# Patient Record
Sex: FEMALE | Race: WHITE | NOT HISPANIC OR LATINO | Employment: OTHER | URBAN - METROPOLITAN AREA
[De-identification: names, ages, dates, MRNs, and addresses within clinical notes are randomized per-mention and may not be internally consistent; named-entity substitution may affect disease eponyms.]

---

## 2023-07-26 PROBLEM — E78.2 MIXED HYPERLIPIDEMIA: Status: ACTIVE | Noted: 2023-07-26

## 2023-07-26 PROBLEM — H93.13 TINNITUS OF BOTH EARS: Status: ACTIVE | Noted: 2023-07-26

## 2023-07-26 PROBLEM — M54.50 CHRONIC BILATERAL LOW BACK PAIN: Status: ACTIVE | Noted: 2023-07-26

## 2023-07-26 PROBLEM — R73.03 PREDIABETES: Status: ACTIVE | Noted: 2023-07-26

## 2023-07-26 PROBLEM — M50.90 CERVICAL DISC DISEASE: Status: ACTIVE | Noted: 2023-07-26

## 2023-07-26 PROBLEM — M85.89 OSTEOPENIA OF MULTIPLE SITES: Status: ACTIVE | Noted: 2023-07-26

## 2023-07-26 PROBLEM — G89.29 CHRONIC BILATERAL LOW BACK PAIN: Status: ACTIVE | Noted: 2023-07-26

## 2023-07-26 PROBLEM — H90.3 SENSORINEURAL HEARING LOSS, BILATERAL: Status: ACTIVE | Noted: 2023-07-26

## 2023-07-26 PROBLEM — K85.90 ACUTE PANCREATITIS (HHS-HCC): Status: ACTIVE | Noted: 2023-07-26

## 2023-07-26 PROBLEM — H93.8X1 SENSATION OF FULLNESS IN RIGHT EAR: Status: ACTIVE | Noted: 2023-07-26

## 2023-07-26 RX ORDER — MULTIVIT-MIN/IRON FUM/FOLIC AC 7.5 MG-4
1 TABLET ORAL DAILY
COMMUNITY
Start: 2015-11-24

## 2023-07-26 RX ORDER — FLUTICASONE PROPIONATE 50 MCG
1 SPRAY, SUSPENSION (ML) NASAL DAILY
COMMUNITY
Start: 2023-01-31

## 2023-07-26 RX ORDER — ROSUVASTATIN CALCIUM 5 MG/1
1 TABLET, COATED ORAL DAILY
COMMUNITY
Start: 2023-01-31 | End: 2024-02-05 | Stop reason: SDUPTHER

## 2023-07-26 RX ORDER — ALBUTEROL SULFATE 90 UG/1
AEROSOL, METERED RESPIRATORY (INHALATION)
COMMUNITY
Start: 2023-01-31 | End: 2024-02-19 | Stop reason: SDUPTHER

## 2023-07-26 RX ORDER — BUDESONIDE AND FORMOTEROL FUMARATE DIHYDRATE 80; 4.5 UG/1; UG/1
AEROSOL RESPIRATORY (INHALATION)
COMMUNITY
Start: 2023-04-06 | End: 2023-08-01 | Stop reason: SINTOL

## 2023-07-26 RX ORDER — PRAVASTATIN SODIUM 40 MG/1
1 TABLET ORAL DAILY
COMMUNITY
End: 2023-08-01 | Stop reason: SINTOL

## 2023-07-27 ENCOUNTER — APPOINTMENT (OUTPATIENT)
Dept: LAB | Facility: LAB | Age: 68
End: 2023-07-27
Payer: COMMERCIAL

## 2023-07-27 LAB
ALANINE AMINOTRANSFERASE (SGPT) (U/L) IN SER/PLAS: 38 U/L (ref 7–45)
ALBUMIN (G/DL) IN SER/PLAS: 4.4 G/DL (ref 3.4–5)
ALKALINE PHOSPHATASE (U/L) IN SER/PLAS: 65 U/L (ref 33–136)
ANION GAP IN SER/PLAS: 17 MMOL/L (ref 10–20)
ASPARTATE AMINOTRANSFERASE (SGOT) (U/L) IN SER/PLAS: 24 U/L (ref 9–39)
BASOPHILS (10*3/UL) IN BLOOD BY AUTOMATED COUNT: 0.03 X10E9/L (ref 0–0.1)
BASOPHILS/100 LEUKOCYTES IN BLOOD BY AUTOMATED COUNT: 0.4 % (ref 0–2)
BILIRUBIN TOTAL (MG/DL) IN SER/PLAS: 0.4 MG/DL (ref 0–1.2)
CALCIUM (MG/DL) IN SER/PLAS: 9.6 MG/DL (ref 8.6–10.3)
CARBON DIOXIDE, TOTAL (MMOL/L) IN SER/PLAS: 23 MMOL/L (ref 21–32)
CHLORIDE (MMOL/L) IN SER/PLAS: 102 MMOL/L (ref 98–107)
CHOLESTEROL (MG/DL) IN SER/PLAS: 289 MG/DL (ref 0–199)
CHOLESTEROL IN HDL (MG/DL) IN SER/PLAS: 44.9 MG/DL
CHOLESTEROL/HDL RATIO: 6.4
CREATININE (MG/DL) IN SER/PLAS: 0.68 MG/DL (ref 0.5–1.05)
EOSINOPHILS (10*3/UL) IN BLOOD BY AUTOMATED COUNT: 0.11 X10E9/L (ref 0–0.7)
EOSINOPHILS/100 LEUKOCYTES IN BLOOD BY AUTOMATED COUNT: 1.4 % (ref 0–6)
ERYTHROCYTE DISTRIBUTION WIDTH (RATIO) BY AUTOMATED COUNT: 14 % (ref 11.5–14.5)
ERYTHROCYTE MEAN CORPUSCULAR HEMOGLOBIN CONCENTRATION (G/DL) BY AUTOMATED: 32.5 G/DL (ref 32–36)
ERYTHROCYTE MEAN CORPUSCULAR VOLUME (FL) BY AUTOMATED COUNT: 87 FL (ref 80–100)
ERYTHROCYTES (10*6/UL) IN BLOOD BY AUTOMATED COUNT: 4.73 X10E12/L (ref 4–5.2)
GFR FEMALE: >90 ML/MIN/1.73M2
GLUCOSE (MG/DL) IN SER/PLAS: 91 MG/DL (ref 74–99)
HEMATOCRIT (%) IN BLOOD BY AUTOMATED COUNT: 41.2 % (ref 36–46)
HEMOGLOBIN (G/DL) IN BLOOD: 13.4 G/DL (ref 12–16)
IMMATURE GRANULOCYTES/100 LEUKOCYTES IN BLOOD BY AUTOMATED COUNT: 0.3 % (ref 0–0.9)
LDL: 171 MG/DL (ref 0–99)
LEUKOCYTES (10*3/UL) IN BLOOD BY AUTOMATED COUNT: 7.9 X10E9/L (ref 4.4–11.3)
LYMPHOCYTES (10*3/UL) IN BLOOD BY AUTOMATED COUNT: 2.96 X10E9/L (ref 1.2–4.8)
LYMPHOCYTES/100 LEUKOCYTES IN BLOOD BY AUTOMATED COUNT: 37.4 % (ref 13–44)
MONOCYTES (10*3/UL) IN BLOOD BY AUTOMATED COUNT: 0.77 X10E9/L (ref 0.1–1)
MONOCYTES/100 LEUKOCYTES IN BLOOD BY AUTOMATED COUNT: 9.7 % (ref 2–10)
NEUTROPHILS (10*3/UL) IN BLOOD BY AUTOMATED COUNT: 4.03 X10E9/L (ref 1.2–7.7)
NEUTROPHILS/100 LEUKOCYTES IN BLOOD BY AUTOMATED COUNT: 50.8 % (ref 40–80)
NON HDL CHOLESTEROL: 244 MG/DL
PLATELETS (10*3/UL) IN BLOOD AUTOMATED COUNT: 347 X10E9/L (ref 150–450)
POTASSIUM (MMOL/L) IN SER/PLAS: 3.9 MMOL/L (ref 3.5–5.3)
PROTEIN TOTAL: 7.5 G/DL (ref 6.4–8.2)
SODIUM (MMOL/L) IN SER/PLAS: 138 MMOL/L (ref 136–145)
TRIGLYCERIDE (MG/DL) IN SER/PLAS: 364 MG/DL (ref 0–149)
UREA NITROGEN (MG/DL) IN SER/PLAS: 7 MG/DL (ref 6–23)
VLDL: 73 MG/DL (ref 0–40)

## 2023-08-01 ENCOUNTER — OFFICE VISIT (OUTPATIENT)
Dept: PRIMARY CARE | Facility: CLINIC | Age: 68
End: 2023-08-01
Payer: COMMERCIAL

## 2023-08-01 VITALS
WEIGHT: 188 LBS | OXYGEN SATURATION: 95 % | HEART RATE: 60 BPM | HEIGHT: 66 IN | RESPIRATION RATE: 16 BRPM | SYSTOLIC BLOOD PRESSURE: 106 MMHG | DIASTOLIC BLOOD PRESSURE: 62 MMHG | BODY MASS INDEX: 30.22 KG/M2

## 2023-08-01 DIAGNOSIS — Z00.00 ANNUAL PHYSICAL EXAM: Primary | ICD-10-CM

## 2023-08-01 DIAGNOSIS — J43.9 PULMONARY EMPHYSEMA, UNSPECIFIED EMPHYSEMA TYPE (MULTI): ICD-10-CM

## 2023-08-01 DIAGNOSIS — Z12.11 ENCOUNTER FOR SCREENING FOR MALIGNANT NEOPLASM OF COLON: ICD-10-CM

## 2023-08-01 DIAGNOSIS — R73.03 PREDIABETES: ICD-10-CM

## 2023-08-01 DIAGNOSIS — E78.2 MIXED HYPERLIPIDEMIA: ICD-10-CM

## 2023-08-01 DIAGNOSIS — Z12.31 ENCOUNTER FOR SCREENING MAMMOGRAM FOR MALIGNANT NEOPLASM OF BREAST: ICD-10-CM

## 2023-08-01 LAB — POC HEMOGLOBIN A1C: 5.9 % (ref 4.2–6.5)

## 2023-08-01 PROCEDURE — 1159F MED LIST DOCD IN RCRD: CPT | Performed by: FAMILY MEDICINE

## 2023-08-01 PROCEDURE — 83036 HEMOGLOBIN GLYCOSYLATED A1C: CPT | Performed by: FAMILY MEDICINE

## 2023-08-01 PROCEDURE — 1160F RVW MEDS BY RX/DR IN RCRD: CPT | Performed by: FAMILY MEDICINE

## 2023-08-01 PROCEDURE — 99214 OFFICE O/P EST MOD 30 MIN: CPT | Performed by: FAMILY MEDICINE

## 2023-08-01 PROCEDURE — 99397 PER PM REEVAL EST PAT 65+ YR: CPT | Performed by: FAMILY MEDICINE

## 2023-08-01 RX ORDER — MONTELUKAST SODIUM 10 MG/1
10 TABLET ORAL NIGHTLY
Qty: 90 TABLET | Refills: 3 | Status: SHIPPED | OUTPATIENT
Start: 2023-08-01 | End: 2024-03-20 | Stop reason: WASHOUT

## 2023-08-01 RX ORDER — TIOTROPIUM BROMIDE 18 UG/1
1 CAPSULE ORAL; RESPIRATORY (INHALATION)
Qty: 90 CAPSULE | Refills: 3 | Status: SHIPPED | OUTPATIENT
Start: 2023-08-01 | End: 2024-02-01 | Stop reason: WASHOUT

## 2023-08-01 NOTE — PROGRESS NOTES
Subjective   Patient ID: Claudia Coffey is a 68 y.o. female who presents for Follow-up.  HPI  Concerns today:    In general the patient states that her health is: good    Regular dental visits: Regular dental visit and has partials  Vision problems: no chagnes  Hearing loss: none    Diet: Trying Mediterrainian  Exercise:walking and aerobic exercise  Weight concerns: yes    Tobacco use:none  Alcohol use:none  Illicit drug use:none    Breast cancer screening:  Last mammogram: 2022  Regular self breast exams: yes  Any changes in the breast:no    Colon cancer screening:  Last done 2020.  reordered   Cologuard    Reviewed chronic medical conditions  Review of Systems    Objective   Physical Exam  General: Patient is alert and oriented ×3 and appears in no acute distress. No respiratory distress.    Head: Atraumatic normocephalic.    Eyes: EOMI, PERRLA      Ears: Canals patent without any irritation, tympanic membranes without inflammation, no swelling, normal light reflex.    Nose: Nares patent. Turbinates are not swollen. No discharge.    Mouth: Normal mucosa. Moist. No erythema, exudates, tonsillar enlargement.    Neck: Normal range of motion, no masses.  Thyroid is palpable and normal in size without any nodules. No anterior cervical or posterior cervical adenopathy.    Heart: Regular rate and rhythm, no murmurs clicks or gallops    Lungs: Clear to auscultation bilaterally without any rhonchi rales or wheezing, lung sounds heard throughout all lung fields    Abdomen: Soft, nontender, no rigidity, rebound, guarding or organomegaly. Bowel sounds ×4 quadrants.    Musculoskeletal: Normal range of motion, strength is grossly intact in the proximal distal muscles of the upper and lower extremities bilaterally, deep tendon reflexes +2 out of 4 and symmetric bilaterally at the patella, Achilles, biceps, triceps, sensation intact.    Nerves: Cranial nerves II through XII appear grossly intact and without deficit    Skin:  Intact, dry, no rashes or erythema    Psych: Normal affect.  Assessment/Plan   Problem List Items Addressed This Visit       Mixed hyperlipidemia    Prediabetes    Relevant Orders    POCT glycosylated hemoglobin (Hb A1C) manually resulted (Completed)     Other Visit Diagnoses       Annual physical exam    -  Primary    Pulmonary emphysema, unspecified emphysema type (CMS/HCC)        Encounter for screening for malignant neoplasm of colon        Encounter for screening mammogram for malignant neoplasm of breast               Patient is a 68-year-old female   Anticipatory guidance given  Mammogram done in 2022 and was normal. reordered  Cologuard was negative in December 23, 2020.  Needs reordered  No need to follow-up with gynecologist at this time     Hyperlipidemia-Uncontrolled after patient stopped Crestor.    Patient stopped pravastatin and atorvastatin due to muscle aches and pains. Tolerating rosuvastatin 5 mg daily. Discussed the importance of her being on a statin medication.  Restarting    Continue vitamin D 2000 IUs daily and coenzyme Q10 100 mg daily  Discussed diet and exercise. Patient has started exercising regularly.  Prediabetes-hemoglobin A1c 5.9 in the office 11/15/2022  -Discussed the risks of elevated blood sugars  Stopped metformin  mg 2 tabs daily due to diarrhea and Jardiance due to frequent urination  Discussed diet and exercise  The patient was instructed to pick one thing each month and change that in her diet to more healthy  Instructed to drink orange juice sparingly  - If no improvement over the next 6 months then we will start Trulicity     Hypertriglyceridemia  Patient will work on diet and exercise and taking rosuvastatin and coenzyme Q 10     DDD cervical thoracic anmd lumbar- Disability for this     Shortness of breath on exertion.  Mild COPD   EKG was done in the office and showed normal sinus rate and rhythm as read by myself. Normal axis. No ST elevations or depressions.  Normal T waves. Normal R wave progression.  PFTs done December 2022 showed normal spirometry.  There is a suggestion of small airway disease.  Diffusing capacity is moderately reduced.  Suggestive of pulmonary parenchymal or pulmonary vascular disease.  They suggested doing a methacholine challenge if the patient was having symptoms related to asthma.  Patient has 30-year pack   echo12/22: Left ventricular systolic function is low normal with a 50-55% estimated ejection fraction.  CT chest low dose 12/22-there are no suspicious parenchymal lung nodules.  There was a recommendation for 1 year follow-up.  She did show mild atherosclerotic disease of the thoracic aorta and of the coronary arteries.  Start the Spiriva 1 x day  Start the Sigulair 1 tab daily

## 2023-08-10 ENCOUNTER — TELEPHONE (OUTPATIENT)
Dept: PRIMARY CARE | Facility: CLINIC | Age: 68
End: 2023-08-10
Payer: COMMERCIAL

## 2023-10-11 ENCOUNTER — HOSPITAL ENCOUNTER (OUTPATIENT)
Dept: RADIOLOGY | Facility: HOSPITAL | Age: 68
Discharge: HOME | End: 2023-10-11
Payer: COMMERCIAL

## 2023-10-11 DIAGNOSIS — Z12.31 ENCOUNTER FOR SCREENING MAMMOGRAM FOR MALIGNANT NEOPLASM OF BREAST: ICD-10-CM

## 2023-10-11 PROCEDURE — 77063 BREAST TOMOSYNTHESIS BI: CPT | Mod: BILATERAL PROCEDURE | Performed by: STUDENT IN AN ORGANIZED HEALTH CARE EDUCATION/TRAINING PROGRAM

## 2023-10-11 PROCEDURE — 77067 SCR MAMMO BI INCL CAD: CPT | Mod: BILATERAL PROCEDURE | Performed by: STUDENT IN AN ORGANIZED HEALTH CARE EDUCATION/TRAINING PROGRAM

## 2023-10-11 PROCEDURE — 77063 BREAST TOMOSYNTHESIS BI: CPT

## 2023-10-11 PROCEDURE — 77067 SCR MAMMO BI INCL CAD: CPT | Mod: 50

## 2023-10-12 DIAGNOSIS — R92.8 ABNORMAL MAMMOGRAM OF LEFT BREAST: Primary | ICD-10-CM

## 2023-10-17 ENCOUNTER — ANCILLARY PROCEDURE (OUTPATIENT)
Dept: RADIOLOGY | Facility: HOSPITAL | Age: 68
End: 2023-10-17
Payer: COMMERCIAL

## 2023-10-17 DIAGNOSIS — R92.8 ABNORMAL MAMMOGRAM OF LEFT BREAST: ICD-10-CM

## 2023-10-17 PROCEDURE — 76642 ULTRASOUND BREAST LIMITED: CPT | Mod: LEFT SIDE | Performed by: RADIOLOGY

## 2023-10-17 PROCEDURE — 76982 USE 1ST TARGET LESION: CPT

## 2023-10-17 PROCEDURE — 76642 ULTRASOUND BREAST LIMITED: CPT | Mod: LT

## 2023-12-18 ENCOUNTER — APPOINTMENT (OUTPATIENT)
Dept: PRIMARY CARE | Facility: CLINIC | Age: 68
End: 2023-12-18
Payer: COMMERCIAL

## 2024-01-10 LAB — NONINV COLON CA DNA+OCC BLD SCRN STL QL: NEGATIVE

## 2024-01-11 ENCOUNTER — TELEPHONE (OUTPATIENT)
Dept: PRIMARY CARE | Facility: CLINIC | Age: 69
End: 2024-01-11
Payer: COMMERCIAL

## 2024-01-11 NOTE — TELEPHONE ENCOUNTER
----- Message from Solitario Cole DO sent at 1/10/2024  8:22 PM EST -----  Let the patient know noni the cologuard was neg

## 2024-01-25 ENCOUNTER — LAB (OUTPATIENT)
Dept: LAB | Facility: LAB | Age: 69
End: 2024-01-25
Payer: COMMERCIAL

## 2024-02-01 ENCOUNTER — APPOINTMENT (OUTPATIENT)
Dept: PRIMARY CARE | Facility: CLINIC | Age: 69
End: 2024-02-01
Payer: COMMERCIAL

## 2024-02-01 ENCOUNTER — OFFICE VISIT (OUTPATIENT)
Dept: PRIMARY CARE | Facility: CLINIC | Age: 69
End: 2024-02-01
Payer: COMMERCIAL

## 2024-02-01 VITALS
TEMPERATURE: 97.2 F | BODY MASS INDEX: 31.66 KG/M2 | DIASTOLIC BLOOD PRESSURE: 76 MMHG | HEART RATE: 67 BPM | OXYGEN SATURATION: 98 % | SYSTOLIC BLOOD PRESSURE: 128 MMHG | WEIGHT: 197 LBS | HEIGHT: 66 IN

## 2024-02-01 DIAGNOSIS — R25.2 LEG CRAMPS: ICD-10-CM

## 2024-02-01 DIAGNOSIS — M50.90 CERVICAL DISC DISEASE: ICD-10-CM

## 2024-02-01 DIAGNOSIS — J43.9 PULMONARY EMPHYSEMA, UNSPECIFIED EMPHYSEMA TYPE (MULTI): ICD-10-CM

## 2024-02-01 DIAGNOSIS — R73.03 PREDIABETES: ICD-10-CM

## 2024-02-01 DIAGNOSIS — I73.9 CLAUDICATION OF CALF MUSCLES (CMS-HCC): ICD-10-CM

## 2024-02-01 DIAGNOSIS — E78.2 MIXED HYPERLIPIDEMIA: ICD-10-CM

## 2024-02-01 DIAGNOSIS — Z00.00 MEDICARE ANNUAL WELLNESS VISIT, INITIAL: Primary | ICD-10-CM

## 2024-02-01 DIAGNOSIS — M85.89 OSTEOPENIA OF MULTIPLE SITES: ICD-10-CM

## 2024-02-01 DIAGNOSIS — Z87.891 HISTORY OF TOBACCO ABUSE: ICD-10-CM

## 2024-02-01 PROCEDURE — G0439 PPPS, SUBSEQ VISIT: HCPCS | Performed by: FAMILY MEDICINE

## 2024-02-01 PROCEDURE — 99214 OFFICE O/P EST MOD 30 MIN: CPT | Performed by: FAMILY MEDICINE

## 2024-02-01 PROCEDURE — 1160F RVW MEDS BY RX/DR IN RCRD: CPT | Performed by: FAMILY MEDICINE

## 2024-02-01 PROCEDURE — 1170F FXNL STATUS ASSESSED: CPT | Performed by: FAMILY MEDICINE

## 2024-02-01 PROCEDURE — 1159F MED LIST DOCD IN RCRD: CPT | Performed by: FAMILY MEDICINE

## 2024-02-01 PROCEDURE — 1036F TOBACCO NON-USER: CPT | Performed by: FAMILY MEDICINE

## 2024-02-01 RX ORDER — BUDESONIDE AND FORMOTEROL FUMARATE DIHYDRATE 160; 4.5 UG/1; UG/1
2 AEROSOL RESPIRATORY (INHALATION)
Qty: 6 G | Refills: 11 | Status: SHIPPED | OUTPATIENT
Start: 2024-02-01 | End: 2024-05-28 | Stop reason: SDUPTHER

## 2024-02-01 RX ORDER — ATORVASTATIN CALCIUM 20 MG/1
TABLET, FILM COATED ORAL
COMMUNITY
Start: 2015-11-24 | End: 2024-02-01 | Stop reason: ALTCHOICE

## 2024-02-01 ASSESSMENT — PATIENT HEALTH QUESTIONNAIRE - PHQ9
SUM OF ALL RESPONSES TO PHQ9 QUESTIONS 1 AND 2: 0
2. FEELING DOWN, DEPRESSED OR HOPELESS: NOT AT ALL
1. LITTLE INTEREST OR PLEASURE IN DOING THINGS: NOT AT ALL

## 2024-02-01 ASSESSMENT — ACTIVITIES OF DAILY LIVING (ADL)
BATHING: INDEPENDENT
GROCERY_SHOPPING: INDEPENDENT
DOING_HOUSEWORK: INDEPENDENT
MANAGING_FINANCES: INDEPENDENT
TAKING_MEDICATION: INDEPENDENT
DRESSING: INDEPENDENT

## 2024-02-01 NOTE — PROGRESS NOTES
Subjective   Patient ID: Claudia Coffey is a 68 y.o. female who presents for Medicare  HPI  Concerns today: Breathing is not doing as well     In general the patient states that her health is: good    Regular dental visits: Regular dental visit and has partials  Vision problems: no chagnes  Hearing loss: none    Diet: Trying Mediterrainian  Exercise:walking and aerobic exercise  Weight concerns: yes    Tobacco use:none  Alcohol use:none  Illicit drug use:none    Breast cancer screening:  Last mammogram: 10/2023.  Repeat 1 year  Regular self breast exams: yes  Any changes in the breast:no    Colon cancer screening:  Cologuard neg 1/3/24    Reviewed chronic medical conditions  Review of Systems    Objective   Physical Exam  General: Patient is alert and oriented ×3 and appears in no acute distress. No respiratory distress.    Head: Atraumatic normocephalic.    Eyes: EOMI, PERRLA      Ears: Canals patent without any irritation, tympanic membranes without inflammation, no swelling, normal light reflex.    Nose: Nares patent. Turbinates are not swollen. No discharge.    Mouth: Normal mucosa. Moist. No erythema, exudates, tonsillar enlargement.    Neck: Normal range of motion, no masses.  Thyroid is palpable and normal in size without any nodules. No anterior cervical or posterior cervical adenopathy.    Heart: Regular rate and rhythm, no murmurs clicks or gallops    Lungs: Clear to auscultation bilaterally without any rhonchi rales or wheezing, lung sounds heard throughout all lung fields    Abdomen: Soft, nontender, no rigidity, rebound, guarding or organomegaly. Bowel sounds ×4 quadrants.    Musculoskeletal: Normal range of motion, strength is grossly intact in the proximal distal muscles of the upper and lower extremities bilaterally, deep tendon reflexes +2 out of 4 and symmetric bilaterally at the patella, Achilles, biceps, triceps, sensation intact.    Nerves: Cranial nerves II through XII appear grossly intact and  without deficit    Skin: Intact, dry, no rashes or erythema    Psych: Normal affect.  Assessment/Plan   Problem List Items Addressed This Visit       Cervical disc disease    Mixed hyperlipidemia    Relevant Orders    Vascular US Ankle Brachial Index (CJ) Without Exercise    Comprehensive Metabolic Panel    CBC and Auto Differential    Lipid Panel    TSH with reflex to Free T4 if abnormal    Vitamin B12    Osteopenia of multiple sites    Relevant Orders    TSH with reflex to Free T4 if abnormal    Prediabetes    Relevant Orders    Comprehensive Metabolic Panel    CBC and Auto Differential    Hemoglobin A1C     Other Visit Diagnoses       Pulmonary emphysema, unspecified emphysema type (CMS/HCC)    -  Primary    Relevant Medications    budesonide-formoteroL (Symbicort) 160-4.5 mcg/actuation inhaler    Leg cramps        Relevant Orders    Vascular US Ankle Brachial Index (CJ) Without Exercise    TSH with reflex to Free T4 if abnormal    History of tobacco abuse        Relevant Orders    Vascular US Ankle Brachial Index (CJ) Without Exercise    Claudication of calf muscles (CMS/HCC)        Relevant Orders    Vascular US Ankle Brachial Index (CJ) Without Exercise        Reviewed in for the patient's past medical history, surgical history, family history, social history  Depression screening was done in the office today using PHQ-9 and anxiety screening was done using NANCY-7.  Memory  was checked using Mini-Mental Status exam today.  Patient scored 30 out of 30  We reviewed the patient's activities of daily living and possible risk for falling.  Patient is stable.  Discussed preventative screenings  Vaccinations were discussed in the office.  We did review the patient's status on influenza vaccination, pneumonia vaccination, tetanus vaccination, and vaccination  Patient was instructed to follow-up with dentist regularly and also with eye doctor regularly  We discussed advanced directives including power of ,  living well and DO NOT RESUSCITATE orders    Patient is a 68-year-old female   Anticipatory guidance given  Mammogram done in October 2023 and fibrocystic breasts.  Repeat in 1 year.  Had US 10/23  Cologuard was negative in December 23, 2020 and 1/3/2024  DEXA 2020- Normal without any osteopenia or osteoporosis  No need to follow-up with gynecologist at this time     Hyperlipidemia-controlled  Patient stopped pravastatin and atorvastatin due to muscle aches and pains. Tolerating rosuvastatin 5 mg daily. Discussed the importance of her being on a statin medication.      Continue vitamin D 2000 IUs daily and coenzyme Q10 100 mg daily  Discussed diet and exercise. Patient has started exercising regularly.  Prediabetes-hemoglobin A1c 5.9 in the office August 2023  -Discussed the risks of elevated blood sugars  Stopped metformin  mg 2 tabs daily due to diarrhea and Jardiance due to frequent urination  Discussed diet and exercise  The patient was instructed to pick one thing each month and change that in her diet to more healthy  Instructed to drink orange juice sparingly  - If no improvement over the next 6 months then we will start Trulicity     Hypertriglyceridemia- stable  Patient will work on diet and exercise and taking rosuvastatin and coenzyme Q 10     DDD cervical thoracic anmd lumbar- Disability for this- stable     Shortness of breath on exertion.  Mild COPD- stable   EKG was done in the office and showed normal sinus rate and rhythm as read by myself. Normal axis. No ST elevations or depressions. Normal T waves. Normal R wave progression.  PFTs done December 2022 showed normal spirometry.  There is a suggestion of small airway disease.  Diffusing capacity is moderately reduced.  Suggestive of pulmonary parenchymal or pulmonary vascular disease.  They suggested doing a methacholine challenge if the patient was having symptoms related to asthma.  Patient has 30-year pack   echo12/22: Left ventricular  systolic function is low normal with a 50-55% estimated ejection fraction.  CT chest low dose 12/22-there are no suspicious parenchymal lung nodules.  There was a recommendation for 1 year follow-up.  She did show mild atherosclerotic disease of the thoracic aorta and of the coronary arteries.  Would suggest starting NAC  Restart the Sigulair 1 tab daily  Start the Symbicort 1 puff 2 x day  Use rescue inhaler if needed    Leg cramps  - Mag Phos 30c or 30X   - Stay hydrated  - CJ ordered  - looking at electrolytes

## 2024-02-05 ENCOUNTER — APPOINTMENT (OUTPATIENT)
Dept: LAB | Facility: LAB | Age: 69
End: 2024-02-05
Payer: COMMERCIAL

## 2024-02-05 ENCOUNTER — HOSPITAL ENCOUNTER (OUTPATIENT)
Dept: VASCULAR MEDICINE | Facility: HOSPITAL | Age: 69
Discharge: HOME | End: 2024-02-05
Payer: COMMERCIAL

## 2024-02-05 DIAGNOSIS — I73.9 CLAUDICATION OF CALF MUSCLES (CMS-HCC): ICD-10-CM

## 2024-02-05 DIAGNOSIS — R25.2 LEG CRAMPS: ICD-10-CM

## 2024-02-05 DIAGNOSIS — E78.2 MIXED HYPERLIPIDEMIA: Primary | ICD-10-CM

## 2024-02-05 DIAGNOSIS — Z87.891 HISTORY OF TOBACCO ABUSE: ICD-10-CM

## 2024-02-05 DIAGNOSIS — E78.2 MIXED HYPERLIPIDEMIA: ICD-10-CM

## 2024-02-05 PROCEDURE — 93922 UPR/L XTREMITY ART 2 LEVELS: CPT | Performed by: INTERNAL MEDICINE

## 2024-02-05 PROCEDURE — 93922 UPR/L XTREMITY ART 2 LEVELS: CPT

## 2024-02-05 RX ORDER — ROSUVASTATIN CALCIUM 5 MG/1
5 TABLET, COATED ORAL DAILY
Qty: 90 TABLET | Refills: 3 | Status: SHIPPED | OUTPATIENT
Start: 2024-02-05 | End: 2024-05-28 | Stop reason: SDUPTHER

## 2024-02-06 ENCOUNTER — TELEPHONE (OUTPATIENT)
Dept: PRIMARY CARE | Facility: CLINIC | Age: 69
End: 2024-02-06
Payer: COMMERCIAL

## 2024-02-06 NOTE — TELEPHONE ENCOUNTER
----- Message from Solitario Cole DO sent at 2/5/2024  5:16 PM EST -----  Please let patient know that her ankle-brachial index was normal

## 2024-02-07 ENCOUNTER — APPOINTMENT (OUTPATIENT)
Dept: CARDIOLOGY | Facility: HOSPITAL | Age: 69
DRG: 872 | End: 2024-02-07
Payer: COMMERCIAL

## 2024-02-07 ENCOUNTER — APPOINTMENT (OUTPATIENT)
Dept: RADIOLOGY | Facility: HOSPITAL | Age: 69
DRG: 872 | End: 2024-02-07
Payer: COMMERCIAL

## 2024-02-07 ENCOUNTER — HOSPITAL ENCOUNTER (INPATIENT)
Facility: HOSPITAL | Age: 69
LOS: 2 days | Discharge: HOME | DRG: 872 | End: 2024-02-11
Attending: STUDENT IN AN ORGANIZED HEALTH CARE EDUCATION/TRAINING PROGRAM | Admitting: INTERNAL MEDICINE
Payer: COMMERCIAL

## 2024-02-07 DIAGNOSIS — R19.7 DIARRHEA, UNSPECIFIED TYPE: ICD-10-CM

## 2024-02-07 DIAGNOSIS — L03.116 CELLULITIS OF LEFT LOWER EXTREMITY: Primary | ICD-10-CM

## 2024-02-07 PROBLEM — L03.90 CELLULITIS: Status: ACTIVE | Noted: 2024-02-07

## 2024-02-07 LAB
ALBUMIN SERPL BCP-MCNC: 4.3 G/DL (ref 3.4–5)
ALP SERPL-CCNC: 67 U/L (ref 33–136)
ALT SERPL W P-5'-P-CCNC: 45 U/L (ref 7–45)
ANION GAP SERPL CALC-SCNC: 15 MMOL/L (ref 10–20)
APTT PPP: 31 SECONDS (ref 27–38)
AST SERPL W P-5'-P-CCNC: 34 U/L (ref 9–39)
BASOPHILS # BLD AUTO: 0.03 X10*3/UL (ref 0–0.1)
BASOPHILS NFR BLD AUTO: 0.2 %
BILIRUB SERPL-MCNC: 0.8 MG/DL (ref 0–1.2)
BUN SERPL-MCNC: 16 MG/DL (ref 6–23)
CALCIUM SERPL-MCNC: 9.3 MG/DL (ref 8.6–10.3)
CHLORIDE SERPL-SCNC: 98 MMOL/L (ref 98–107)
CO2 SERPL-SCNC: 24 MMOL/L (ref 21–32)
CREAT SERPL-MCNC: 0.9 MG/DL (ref 0.5–1.05)
CRP SERPL-MCNC: 26.49 MG/DL
EGFRCR SERPLBLD CKD-EPI 2021: 71 ML/MIN/1.73M*2
EOSINOPHIL # BLD AUTO: 0.05 X10*3/UL (ref 0–0.7)
EOSINOPHIL NFR BLD AUTO: 0.3 %
ERYTHROCYTE [DISTWIDTH] IN BLOOD BY AUTOMATED COUNT: 14.2 % (ref 11.5–14.5)
ERYTHROCYTE [SEDIMENTATION RATE] IN BLOOD BY WESTERGREN METHOD: 36 MM/H (ref 0–30)
FLUAV RNA RESP QL NAA+PROBE: NOT DETECTED
FLUBV RNA RESP QL NAA+PROBE: NOT DETECTED
GLUCOSE SERPL-MCNC: 107 MG/DL (ref 74–99)
HCT VFR BLD AUTO: 42.1 % (ref 36–46)
HGB BLD-MCNC: 14 G/DL (ref 12–16)
IMM GRANULOCYTES # BLD AUTO: 0.07 X10*3/UL (ref 0–0.7)
IMM GRANULOCYTES NFR BLD AUTO: 0.4 % (ref 0–0.9)
INR PPP: 1.4 (ref 0.9–1.1)
LACTATE SERPL-SCNC: 1.6 MMOL/L (ref 0.4–2)
LYMPHOCYTES # BLD AUTO: 1.17 X10*3/UL (ref 1.2–4.8)
LYMPHOCYTES NFR BLD AUTO: 6.3 %
MCH RBC QN AUTO: 28.4 PG (ref 26–34)
MCHC RBC AUTO-ENTMCNC: 33.3 G/DL (ref 32–36)
MCV RBC AUTO: 85 FL (ref 80–100)
MONOCYTES # BLD AUTO: 1.04 X10*3/UL (ref 0.1–1)
MONOCYTES NFR BLD AUTO: 5.6 %
NEUTROPHILS # BLD AUTO: 16.23 X10*3/UL (ref 1.2–7.7)
NEUTROPHILS NFR BLD AUTO: 87.2 %
NRBC BLD-RTO: 0 /100 WBCS (ref 0–0)
PLATELET # BLD AUTO: 283 X10*3/UL (ref 150–450)
POTASSIUM SERPL-SCNC: 3.6 MMOL/L (ref 3.5–5.3)
PROT SERPL-MCNC: 7.9 G/DL (ref 6.4–8.2)
PROTHROMBIN TIME: 15.3 SECONDS (ref 9.8–12.8)
RBC # BLD AUTO: 4.93 X10*6/UL (ref 4–5.2)
SARS-COV-2 RNA RESP QL NAA+PROBE: NOT DETECTED
SODIUM SERPL-SCNC: 133 MMOL/L (ref 136–145)
WBC # BLD AUTO: 18.6 X10*3/UL (ref 4.4–11.3)

## 2024-02-07 PROCEDURE — 99223 1ST HOSP IP/OBS HIGH 75: CPT | Performed by: INTERNAL MEDICINE

## 2024-02-07 PROCEDURE — 2500000004 HC RX 250 GENERAL PHARMACY W/ HCPCS (ALT 636 FOR OP/ED): Performed by: NURSE PRACTITIONER

## 2024-02-07 PROCEDURE — 85025 COMPLETE CBC W/AUTO DIFF WBC: CPT | Performed by: NURSE PRACTITIONER

## 2024-02-07 PROCEDURE — 93005 ELECTROCARDIOGRAM TRACING: CPT

## 2024-02-07 PROCEDURE — 87040 BLOOD CULTURE FOR BACTERIA: CPT | Mod: GEALAB | Performed by: NURSE PRACTITIONER

## 2024-02-07 PROCEDURE — G0378 HOSPITAL OBSERVATION PER HR: HCPCS

## 2024-02-07 PROCEDURE — 96376 TX/PRO/DX INJ SAME DRUG ADON: CPT

## 2024-02-07 PROCEDURE — 71045 X-RAY EXAM CHEST 1 VIEW: CPT | Mod: FOREIGN READ | Performed by: RADIOLOGY

## 2024-02-07 PROCEDURE — 2500000004 HC RX 250 GENERAL PHARMACY W/ HCPCS (ALT 636 FOR OP/ED): Performed by: INTERNAL MEDICINE

## 2024-02-07 PROCEDURE — 96365 THER/PROPH/DIAG IV INF INIT: CPT

## 2024-02-07 PROCEDURE — 80053 COMPREHEN METABOLIC PANEL: CPT | Performed by: NURSE PRACTITIONER

## 2024-02-07 PROCEDURE — 2500000001 HC RX 250 WO HCPCS SELF ADMINISTERED DRUGS (ALT 637 FOR MEDICARE OP): Performed by: NURSE PRACTITIONER

## 2024-02-07 PROCEDURE — 83605 ASSAY OF LACTIC ACID: CPT | Performed by: NURSE PRACTITIONER

## 2024-02-07 PROCEDURE — 87636 SARSCOV2 & INF A&B AMP PRB: CPT | Performed by: NURSE PRACTITIONER

## 2024-02-07 PROCEDURE — 86140 C-REACTIVE PROTEIN: CPT | Performed by: NURSE PRACTITIONER

## 2024-02-07 PROCEDURE — 36415 COLL VENOUS BLD VENIPUNCTURE: CPT | Performed by: NURSE PRACTITIONER

## 2024-02-07 PROCEDURE — 71045 X-RAY EXAM CHEST 1 VIEW: CPT | Mod: FR

## 2024-02-07 PROCEDURE — 73590 X-RAY EXAM OF LOWER LEG: CPT | Mod: LEFT SIDE | Performed by: RADIOLOGY

## 2024-02-07 PROCEDURE — 85610 PROTHROMBIN TIME: CPT | Performed by: NURSE PRACTITIONER

## 2024-02-07 PROCEDURE — 85652 RBC SED RATE AUTOMATED: CPT | Performed by: NURSE PRACTITIONER

## 2024-02-07 PROCEDURE — A4217 STERILE WATER/SALINE, 500 ML: HCPCS | Performed by: NURSE PRACTITIONER

## 2024-02-07 PROCEDURE — 73590 X-RAY EXAM OF LOWER LEG: CPT | Mod: LT,FR

## 2024-02-07 PROCEDURE — 87081 CULTURE SCREEN ONLY: CPT | Mod: GEALAB | Performed by: NURSE PRACTITIONER

## 2024-02-07 PROCEDURE — 99285 EMERGENCY DEPT VISIT HI MDM: CPT | Mod: 25 | Performed by: STUDENT IN AN ORGANIZED HEALTH CARE EDUCATION/TRAINING PROGRAM

## 2024-02-07 RX ORDER — DOCUSATE SODIUM 100 MG/1
100 CAPSULE, LIQUID FILLED ORAL 2 TIMES DAILY
Status: DISCONTINUED | OUTPATIENT
Start: 2024-02-07 | End: 2024-02-07

## 2024-02-07 RX ORDER — ACETAMINOPHEN 325 MG/1
650 TABLET ORAL EVERY 6 HOURS PRN
Status: DISCONTINUED | OUTPATIENT
Start: 2024-02-07 | End: 2024-02-11 | Stop reason: HOSPADM

## 2024-02-07 RX ORDER — CALCIUM CARBONATE 200(500)MG
500 TABLET,CHEWABLE ORAL 4 TIMES DAILY PRN
Status: DISCONTINUED | OUTPATIENT
Start: 2024-02-07 | End: 2024-02-11 | Stop reason: HOSPADM

## 2024-02-07 RX ORDER — ENOXAPARIN SODIUM 100 MG/ML
40 INJECTION SUBCUTANEOUS EVERY 24 HOURS
Status: DISCONTINUED | OUTPATIENT
Start: 2024-02-07 | End: 2024-02-11 | Stop reason: HOSPADM

## 2024-02-07 RX ORDER — ALBUTEROL SULFATE 90 UG/1
2 AEROSOL, METERED RESPIRATORY (INHALATION) EVERY 6 HOURS PRN
Status: DISCONTINUED | OUTPATIENT
Start: 2024-02-07 | End: 2024-02-11 | Stop reason: HOSPADM

## 2024-02-07 RX ORDER — TALC
3 POWDER (GRAM) TOPICAL DAILY
Status: DISCONTINUED | OUTPATIENT
Start: 2024-02-07 | End: 2024-02-11 | Stop reason: HOSPADM

## 2024-02-07 RX ORDER — ALUMINUM HYDROXIDE, MAGNESIUM HYDROXIDE, AND SIMETHICONE 1200; 120; 1200 MG/30ML; MG/30ML; MG/30ML
30 SUSPENSION ORAL EVERY 6 HOURS PRN
Status: DISCONTINUED | OUTPATIENT
Start: 2024-02-07 | End: 2024-02-11 | Stop reason: HOSPADM

## 2024-02-07 RX ORDER — GUAIFENESIN/DEXTROMETHORPHAN 100-10MG/5
5 SYRUP ORAL EVERY 4 HOURS PRN
Status: DISCONTINUED | OUTPATIENT
Start: 2024-02-07 | End: 2024-02-11 | Stop reason: HOSPADM

## 2024-02-07 RX ORDER — ACETAMINOPHEN 325 MG/1
650 TABLET ORAL EVERY 4 HOURS PRN
Status: DISCONTINUED | OUTPATIENT
Start: 2024-02-07 | End: 2024-02-11 | Stop reason: HOSPADM

## 2024-02-07 RX ORDER — ONDANSETRON 4 MG/1
4 TABLET, FILM COATED ORAL EVERY 8 HOURS PRN
Status: DISCONTINUED | OUTPATIENT
Start: 2024-02-07 | End: 2024-02-11 | Stop reason: HOSPADM

## 2024-02-07 RX ORDER — ONDANSETRON HYDROCHLORIDE 2 MG/ML
4 INJECTION, SOLUTION INTRAVENOUS EVERY 8 HOURS PRN
Status: DISCONTINUED | OUTPATIENT
Start: 2024-02-07 | End: 2024-02-11 | Stop reason: HOSPADM

## 2024-02-07 RX ORDER — SODIUM CHLORIDE 9 MG/ML
85 INJECTION, SOLUTION INTRAVENOUS CONTINUOUS
Status: DISCONTINUED | OUTPATIENT
Start: 2024-02-07 | End: 2024-02-08

## 2024-02-07 RX ORDER — MULTIVIT-MIN/IRON FUM/FOLIC AC 7.5 MG-4
1 TABLET ORAL DAILY
Status: DISCONTINUED | OUTPATIENT
Start: 2024-02-08 | End: 2024-02-11 | Stop reason: HOSPADM

## 2024-02-07 RX ORDER — VANCOMYCIN HYDROCHLORIDE 750 MG/150ML
750 INJECTION, SOLUTION INTRAVENOUS EVERY 12 HOURS
Status: DISCONTINUED | OUTPATIENT
Start: 2024-02-08 | End: 2024-02-09

## 2024-02-07 RX ORDER — GUAIFENESIN 600 MG/1
600 TABLET, EXTENDED RELEASE ORAL EVERY 12 HOURS PRN
Status: DISCONTINUED | OUTPATIENT
Start: 2024-02-07 | End: 2024-02-11 | Stop reason: HOSPADM

## 2024-02-07 RX ORDER — MONTELUKAST SODIUM 10 MG/1
10 TABLET ORAL NIGHTLY
Status: DISCONTINUED | OUTPATIENT
Start: 2024-02-07 | End: 2024-02-11 | Stop reason: HOSPADM

## 2024-02-07 RX ORDER — DOCUSATE SODIUM 100 MG/1
100 CAPSULE, LIQUID FILLED ORAL 2 TIMES DAILY
Status: DISCONTINUED | OUTPATIENT
Start: 2024-02-07 | End: 2024-02-10

## 2024-02-07 RX ORDER — ENOXAPARIN SODIUM 100 MG/ML
40 INJECTION SUBCUTANEOUS EVERY 24 HOURS
Status: DISCONTINUED | OUTPATIENT
Start: 2024-02-07 | End: 2024-02-07

## 2024-02-07 RX ORDER — ACETAMINOPHEN 325 MG/1
975 TABLET ORAL ONCE
Status: COMPLETED | OUTPATIENT
Start: 2024-02-07 | End: 2024-02-07

## 2024-02-07 RX ORDER — ROSUVASTATIN CALCIUM 10 MG/1
5 TABLET, COATED ORAL DAILY
Status: DISCONTINUED | OUTPATIENT
Start: 2024-02-08 | End: 2024-02-11 | Stop reason: HOSPADM

## 2024-02-07 RX ORDER — ACETAMINOPHEN 160 MG/5ML
650 SOLUTION ORAL EVERY 4 HOURS PRN
Status: DISCONTINUED | OUTPATIENT
Start: 2024-02-07 | End: 2024-02-11 | Stop reason: HOSPADM

## 2024-02-07 RX ORDER — ACETAMINOPHEN 650 MG/1
650 SUPPOSITORY RECTAL EVERY 4 HOURS PRN
Status: DISCONTINUED | OUTPATIENT
Start: 2024-02-07 | End: 2024-02-11 | Stop reason: HOSPADM

## 2024-02-07 RX ADMIN — ACETAMINOPHEN 975 MG: 325 TABLET ORAL at 20:29

## 2024-02-07 RX ADMIN — PIPERACILLIN SODIUM AND TAZOBACTAM SODIUM 3.38 G: 3; .375 INJECTION, SOLUTION INTRAVENOUS at 18:53

## 2024-02-07 RX ADMIN — SODIUM CHLORIDE 85 ML/HR: 9 INJECTION, SOLUTION INTRAVENOUS at 23:01

## 2024-02-07 RX ADMIN — VANCOMYCIN HYDROCHLORIDE 1500 MG: 10 INJECTION, POWDER, LYOPHILIZED, FOR SOLUTION INTRAVENOUS at 19:12

## 2024-02-07 RX ADMIN — Medication 3 MG: at 23:01

## 2024-02-07 SDOH — SOCIAL STABILITY: SOCIAL INSECURITY: DO YOU FEEL UNSAFE GOING BACK TO THE PLACE WHERE YOU ARE LIVING?: NO

## 2024-02-07 SDOH — SOCIAL STABILITY: SOCIAL INSECURITY: ARE YOU OR HAVE YOU BEEN THREATENED OR ABUSED PHYSICALLY, EMOTIONALLY, OR SEXUALLY BY ANYONE?: NO

## 2024-02-07 SDOH — SOCIAL STABILITY: SOCIAL INSECURITY: ARE THERE ANY APPARENT SIGNS OF INJURIES/BEHAVIORS THAT COULD BE RELATED TO ABUSE/NEGLECT?: NO

## 2024-02-07 SDOH — SOCIAL STABILITY: SOCIAL INSECURITY: ABUSE: ADULT

## 2024-02-07 SDOH — SOCIAL STABILITY: SOCIAL INSECURITY: DO YOU FEEL ANYONE HAS EXPLOITED OR TAKEN ADVANTAGE OF YOU FINANCIALLY OR OF YOUR PERSONAL PROPERTY?: NO

## 2024-02-07 SDOH — SOCIAL STABILITY: SOCIAL INSECURITY: HAS ANYONE EVER THREATENED TO HURT YOUR FAMILY OR YOUR PETS?: NO

## 2024-02-07 SDOH — SOCIAL STABILITY: SOCIAL INSECURITY: WERE YOU ABLE TO COMPLETE ALL THE BEHAVIORAL HEALTH SCREENINGS?: YES

## 2024-02-07 SDOH — SOCIAL STABILITY: SOCIAL INSECURITY: DOES ANYONE TRY TO KEEP YOU FROM HAVING/CONTACTING OTHER FRIENDS OR DOING THINGS OUTSIDE YOUR HOME?: NO

## 2024-02-07 SDOH — SOCIAL STABILITY: SOCIAL INSECURITY: HAVE YOU HAD THOUGHTS OF HARMING ANYONE ELSE?: NO

## 2024-02-07 ASSESSMENT — COGNITIVE AND FUNCTIONAL STATUS - GENERAL
DAILY ACTIVITIY SCORE: 24
MOBILITY SCORE: 24
PATIENT BASELINE BEDBOUND: NO

## 2024-02-07 ASSESSMENT — ACTIVITIES OF DAILY LIVING (ADL)
TOILETING: INDEPENDENT
HEARING - LEFT EAR: DIFFICULTY WITH NOISE
LACK_OF_TRANSPORTATION: NO
FEEDING YOURSELF: INDEPENDENT
BATHING: INDEPENDENT
WALKS IN HOME: INDEPENDENT
HEARING - RIGHT EAR: DIFFICULTY WITH NOISE
ADEQUATE_TO_COMPLETE_ADL: YES
ASSISTIVE_DEVICE: EYEGLASSES;DENTURES UPPER;DENTURES LOWER
DRESSING YOURSELF: INDEPENDENT
JUDGMENT_ADEQUATE_SAFELY_COMPLETE_DAILY_ACTIVITIES: YES
PATIENT'S MEMORY ADEQUATE TO SAFELY COMPLETE DAILY ACTIVITIES?: YES
GROOMING: INDEPENDENT

## 2024-02-07 ASSESSMENT — PAIN SCALES - GENERAL
PAINLEVEL_OUTOF10: 4
PAINLEVEL_OUTOF10: 2

## 2024-02-07 ASSESSMENT — PATIENT HEALTH QUESTIONNAIRE - PHQ9
SUM OF ALL RESPONSES TO PHQ9 QUESTIONS 1 & 2: 0
2. FEELING DOWN, DEPRESSED OR HOPELESS: NOT AT ALL
1. LITTLE INTEREST OR PLEASURE IN DOING THINGS: NOT AT ALL

## 2024-02-07 ASSESSMENT — LIFESTYLE VARIABLES
EVER FELT BAD OR GUILTY ABOUT YOUR DRINKING: NO
AUDIT-C TOTAL SCORE: 0
AUDIT-C TOTAL SCORE: 0
HOW OFTEN DO YOU HAVE A DRINK CONTAINING ALCOHOL: NEVER
HOW MANY STANDARD DRINKS CONTAINING ALCOHOL DO YOU HAVE ON A TYPICAL DAY: PATIENT DOES NOT DRINK
HOW OFTEN DO YOU HAVE 6 OR MORE DRINKS ON ONE OCCASION: NEVER
EVER HAD A DRINK FIRST THING IN THE MORNING TO STEADY YOUR NERVES TO GET RID OF A HANGOVER: NO
HAVE YOU EVER FELT YOU SHOULD CUT DOWN ON YOUR DRINKING: NO
HAVE PEOPLE ANNOYED YOU BY CRITICIZING YOUR DRINKING: NO
SKIP TO QUESTIONS 9-10: 1

## 2024-02-07 ASSESSMENT — COLUMBIA-SUICIDE SEVERITY RATING SCALE - C-SSRS
1. IN THE PAST MONTH, HAVE YOU WISHED YOU WERE DEAD OR WISHED YOU COULD GO TO SLEEP AND NOT WAKE UP?: NO
6. HAVE YOU EVER DONE ANYTHING, STARTED TO DO ANYTHING, OR PREPARED TO DO ANYTHING TO END YOUR LIFE?: NO
2. HAVE YOU ACTUALLY HAD ANY THOUGHTS OF KILLING YOURSELF?: NO

## 2024-02-07 ASSESSMENT — PAIN DESCRIPTION - PAIN TYPE: TYPE: ACUTE PAIN

## 2024-02-07 ASSESSMENT — PAIN DESCRIPTION - LOCATION: LOCATION: LEG

## 2024-02-07 ASSESSMENT — PAIN - FUNCTIONAL ASSESSMENT
PAIN_FUNCTIONAL_ASSESSMENT: 0-10
PAIN_FUNCTIONAL_ASSESSMENT: 0-10

## 2024-02-07 ASSESSMENT — PAIN DESCRIPTION - ORIENTATION: ORIENTATION: LEFT

## 2024-02-07 ASSESSMENT — PAIN DESCRIPTION - DESCRIPTORS
DESCRIPTORS: ACHING
DESCRIPTORS: ACHING

## 2024-02-07 NOTE — ED TRIAGE NOTES
Patient c/o left lower extremity redness and swelling for the past few days. Patient is worried she has blood poisoning.

## 2024-02-07 NOTE — ED PROVIDER NOTES
HPI   Chief Complaint   Patient presents with    Leg Swelling       68-year-old female was cleaning out her shed and a tank from the shed punctured her skin of her lateral left tib-fib.  She noticed that her leg became red hot today and erythematous.  Her leg was outlined and it extends all the way from her ankle to the right below her knee and it encircles her entire leg.  She endorses a fever and chills.  She endorses feeling weak.  In triage she was tachycardic with a heart rate of 109 bpm.  She was afebrile with a temp of 36.8.  She is not on any anticoagulant medication.  She has a prior history which includes chronic shoulder pain, muscle skeletal system tissue disease, pancreatitis, and hyperglycemia.  She denies history of diabetes.  She has a history of balance disorder and sciatica.  She quit smoking after 23 years of smoking.      History provided by:  Patient and spouse   used: No                        [place] Coma Scale Score: 15                     Patient History   Past Medical History:   Diagnosis Date    Cervical disc disorder, unspecified, unspecified cervical region 11/15/2022    Cervical disc disease    Pain in right shoulder 07/08/2021    Acute pain of right shoulder    Personal history of other diseases of the digestive system     History of fatty infiltration of liver    Personal history of other diseases of the digestive system     History of acute pancreatitis    Personal history of other diseases of the musculoskeletal system and connective tissue     History of osteopenia    Personal history of other endocrine, nutritional and metabolic disease 12/10/2020    History of hyperglycemia    Personal history of other specified conditions     History of balance disorder    Sciatica, unspecified side     Sciatic pain     Past Surgical History:   Procedure Laterality Date    OTHER SURGICAL HISTORY  07/28/2020    Dilation and curettage    OTHER SURGICAL HISTORY  07/28/2020     Ankle surgery    OTHER SURGICAL HISTORY  2020    Biopsy    OTHER SURGICAL HISTORY  2020    Colonoscopy    OTHER SURGICAL HISTORY  2020    Tubal ligation     No family history on file.  Social History     Tobacco Use    Smoking status: Former     Packs/day: 1.00     Years: 30.00     Additional pack years: 0.00     Total pack years: 30.00     Types: Cigarettes     Start date:      Quit date:      Years since quittin.1    Smokeless tobacco: Never   Substance Use Topics    Alcohol use: Never    Drug use: Never       Physical Exam   ED Triage Vitals [24 1740]   Temperature Heart Rate Respirations BP   36.8 °C (98.2 °F) (!) 109 18 145/89      Pulse Ox Temp Source Heart Rate Source Patient Position   94 % Temporal Monitor --      BP Location FiO2 (%)     -- --       Physical Exam  Constitutional:       Appearance: She is obese.   HENT:      Head: Normocephalic and atraumatic.      Nose: Nose normal.      Mouth/Throat:      Mouth: Mucous membranes are dry.   Eyes:      Extraocular Movements: Extraocular movements intact.      Pupils: Pupils are equal, round, and reactive to light.   Cardiovascular:      Rate and Rhythm: Regular rhythm. Tachycardia present.   Pulmonary:      Effort: Pulmonary effort is normal.      Breath sounds: Normal breath sounds.   Abdominal:      General: Abdomen is flat.      Palpations: Abdomen is soft.   Musculoskeletal:         General: Tenderness present. Normal range of motion.      Cervical back: Normal range of motion.   Skin:     General: Skin is warm.      Comments: Entire left lower extremity between the proximal knee and the ankle is erythematous and circumferential.  Pedal pulses 2/2.  Posterior tibial pulses 2/2.  Cap refills less than 2 seconds.  Skin feels warm to the touch.   Neurological:      Mental Status: She is alert.         ED Course & MDM   Diagnoses as of 24   Cellulitis of left lower extremity       Medical Decision Making  I  "discussed with attending whether we should possibly get an ultrasound at this time it appears to be more cellulitis.  I ordered an x-ray to rule out foreign body or osteomyelitis.  I started patient on vancomycin and Zosyn and ordered blood cultures basic labs and inflammatory markers.  She will most certainly come into the hospital for IV antibiotics.  Patient received her tetanus booster.  She is presently receiving vancomycin and started to develop redness on her face and asked them to slow the vancomycin down with concern for \"red man\" syndrome.  She was denying dyspnea or itching.  She also received her Zosyn.  Her COVID and flu were negative.  Her metabolic panel was normal.  Her inflammatory markers were elevated with a sed rate of 36 and a CRP of 27.  Her lactate was normal.  She had acute leukocytosis with a white count of 18.6 and for the last 2 years her white blood cell count has always been normal.  Her neutrophil count was 16.2.  X-ray of the tib-fib showed no acute osseous abnormality.  There was no radiographic evidence of foreign body.  There was no parosteal new bone or bony lysis to suggest osteomyelitis.  Patient was seen and staffed with attending and admitted to hospital service.    Amount and/or Complexity of Data Reviewed  Labs: ordered.  Radiology: ordered and independent interpretation performed.        Procedure  Procedures     Ethan Mark, APRN-CNP  02/07/24 1959    "

## 2024-02-08 ENCOUNTER — APPOINTMENT (OUTPATIENT)
Dept: RADIOLOGY | Facility: HOSPITAL | Age: 69
DRG: 872 | End: 2024-02-08
Payer: COMMERCIAL

## 2024-02-08 LAB
ANION GAP SERPL CALC-SCNC: 14 MMOL/L (ref 10–20)
BUN SERPL-MCNC: 13 MG/DL (ref 6–23)
CALCIUM SERPL-MCNC: 8.4 MG/DL (ref 8.6–10.3)
CHLORIDE SERPL-SCNC: 99 MMOL/L (ref 98–107)
CO2 SERPL-SCNC: 24 MMOL/L (ref 21–32)
CREAT SERPL-MCNC: 0.8 MG/DL (ref 0.5–1.05)
EGFRCR SERPLBLD CKD-EPI 2021: 81 ML/MIN/1.73M*2
ERYTHROCYTE [DISTWIDTH] IN BLOOD BY AUTOMATED COUNT: 14.3 % (ref 11.5–14.5)
GLUCOSE SERPL-MCNC: 118 MG/DL (ref 74–99)
HCT VFR BLD AUTO: 37.9 % (ref 36–46)
HGB BLD-MCNC: 12.5 G/DL (ref 12–16)
MCH RBC QN AUTO: 28.3 PG (ref 26–34)
MCHC RBC AUTO-ENTMCNC: 33 G/DL (ref 32–36)
MCV RBC AUTO: 86 FL (ref 80–100)
NRBC BLD-RTO: 0 /100 WBCS (ref 0–0)
PLATELET # BLD AUTO: 243 X10*3/UL (ref 150–450)
POTASSIUM SERPL-SCNC: 3.7 MMOL/L (ref 3.5–5.3)
RBC # BLD AUTO: 4.42 X10*6/UL (ref 4–5.2)
SODIUM SERPL-SCNC: 133 MMOL/L (ref 136–145)
WBC # BLD AUTO: 15 X10*3/UL (ref 4.4–11.3)

## 2024-02-08 PROCEDURE — 85027 COMPLETE CBC AUTOMATED: CPT | Performed by: NURSE PRACTITIONER

## 2024-02-08 PROCEDURE — 36415 COLL VENOUS BLD VENIPUNCTURE: CPT | Performed by: INTERNAL MEDICINE

## 2024-02-08 PROCEDURE — 2550000001 HC RX 255 CONTRASTS: Performed by: INTERNAL MEDICINE

## 2024-02-08 PROCEDURE — 80048 BASIC METABOLIC PNL TOTAL CA: CPT | Performed by: INTERNAL MEDICINE

## 2024-02-08 PROCEDURE — 2500000001 HC RX 250 WO HCPCS SELF ADMINISTERED DRUGS (ALT 637 FOR MEDICARE OP): Performed by: NURSE PRACTITIONER

## 2024-02-08 PROCEDURE — 2500000001 HC RX 250 WO HCPCS SELF ADMINISTERED DRUGS (ALT 637 FOR MEDICARE OP): Performed by: INTERNAL MEDICINE

## 2024-02-08 PROCEDURE — 73701 CT LOWER EXTREMITY W/DYE: CPT | Mod: LT

## 2024-02-08 PROCEDURE — 73701 CT LOWER EXTREMITY W/DYE: CPT | Mod: LEFT SIDE | Performed by: STUDENT IN AN ORGANIZED HEALTH CARE EDUCATION/TRAINING PROGRAM

## 2024-02-08 PROCEDURE — 2500000004 HC RX 250 GENERAL PHARMACY W/ HCPCS (ALT 636 FOR OP/ED): Performed by: INTERNAL MEDICINE

## 2024-02-08 PROCEDURE — G0378 HOSPITAL OBSERVATION PER HR: HCPCS

## 2024-02-08 PROCEDURE — 99233 SBSQ HOSP IP/OBS HIGH 50: CPT | Performed by: PHYSICIAN ASSISTANT

## 2024-02-08 RX ORDER — FLUTICASONE FUROATE AND VILANTEROL 200; 25 UG/1; UG/1
1 POWDER RESPIRATORY (INHALATION)
Status: DISCONTINUED | OUTPATIENT
Start: 2024-02-08 | End: 2024-02-11 | Stop reason: HOSPADM

## 2024-02-08 RX ORDER — BUDESONIDE AND FORMOTEROL FUMARATE DIHYDRATE 160; 4.5 UG/1; UG/1
2 AEROSOL RESPIRATORY (INHALATION)
Status: DISCONTINUED | OUTPATIENT
Start: 2024-02-08 | End: 2024-02-08

## 2024-02-08 RX ADMIN — PIPERACILLIN SODIUM AND TAZOBACTAM SODIUM 3.38 G: 3; .375 INJECTION, SOLUTION INTRAVENOUS at 17:30

## 2024-02-08 RX ADMIN — DOCUSATE SODIUM 100 MG: 100 CAPSULE, LIQUID FILLED ORAL at 20:30

## 2024-02-08 RX ADMIN — PIPERACILLIN SODIUM AND TAZOBACTAM SODIUM 3.38 G: 3; .375 INJECTION, SOLUTION INTRAVENOUS at 12:02

## 2024-02-08 RX ADMIN — ACETAMINOPHEN 650 MG: 325 TABLET ORAL at 09:04

## 2024-02-08 RX ADMIN — Medication 3 MG: at 20:30

## 2024-02-08 RX ADMIN — PIPERACILLIN SODIUM AND TAZOBACTAM SODIUM 3.38 G: 3; .375 INJECTION, SOLUTION INTRAVENOUS at 06:02

## 2024-02-08 RX ADMIN — VANCOMYCIN HYDROCHLORIDE 750 MG: 750 INJECTION, SOLUTION INTRAVENOUS at 06:35

## 2024-02-08 RX ADMIN — PIPERACILLIN SODIUM AND TAZOBACTAM SODIUM 3.38 G: 3; .375 INJECTION, SOLUTION INTRAVENOUS at 23:27

## 2024-02-08 RX ADMIN — ROSUVASTATIN CALCIUM 5 MG: 10 TABLET, FILM COATED ORAL at 09:03

## 2024-02-08 RX ADMIN — IOHEXOL 75 ML: 350 INJECTION, SOLUTION INTRAVENOUS at 11:28

## 2024-02-08 RX ADMIN — VANCOMYCIN HYDROCHLORIDE 750 MG: 750 INJECTION, SOLUTION INTRAVENOUS at 19:22

## 2024-02-08 RX ADMIN — PIPERACILLIN SODIUM AND TAZOBACTAM SODIUM 3.38 G: 3; .375 INJECTION, SOLUTION INTRAVENOUS at 01:01

## 2024-02-08 RX ADMIN — ACETAMINOPHEN 650 MG: 325 TABLET ORAL at 20:30

## 2024-02-08 RX ADMIN — Medication 1 TABLET: at 09:04

## 2024-02-08 ASSESSMENT — PAIN SCALES - GENERAL
PAINLEVEL_OUTOF10: 7
PAINLEVEL_OUTOF10: 3
PAINLEVEL_OUTOF10: 1
PAINLEVEL_OUTOF10: 1
PAINLEVEL_OUTOF10: 3

## 2024-02-08 ASSESSMENT — COGNITIVE AND FUNCTIONAL STATUS - GENERAL
DAILY ACTIVITIY SCORE: 24
MOBILITY SCORE: 24

## 2024-02-08 ASSESSMENT — ENCOUNTER SYMPTOMS
ABDOMINAL PAIN: 0
EYES NEGATIVE: 1
CONSTIPATION: 0
PSYCHIATRIC NEGATIVE: 1
NEUROLOGICAL NEGATIVE: 1
ANAL BLEEDING: 0
PALPITATIONS: 0
NAUSEA: 1
ROS SKIN COMMENTS: SEE HPI
ALLERGIC/IMMUNOLOGIC NEGATIVE: 1
HEMATOLOGIC/LYMPHATIC NEGATIVE: 1
ENDOCRINE NEGATIVE: 1
RESPIRATORY NEGATIVE: 1
MYALGIAS: 1
VOMITING: 0
ABDOMINAL DISTENTION: 0

## 2024-02-08 ASSESSMENT — PAIN - FUNCTIONAL ASSESSMENT
PAIN_FUNCTIONAL_ASSESSMENT: 0-10

## 2024-02-08 ASSESSMENT — PAIN DESCRIPTION - DESCRIPTORS
DESCRIPTORS: ACHING
DESCRIPTORS: ACHING

## 2024-02-08 ASSESSMENT — PAIN DESCRIPTION - LOCATION
LOCATION: HEAD
LOCATION: LEG

## 2024-02-08 ASSESSMENT — PAIN DESCRIPTION - ORIENTATION: ORIENTATION: LEFT

## 2024-02-08 ASSESSMENT — ACTIVITIES OF DAILY LIVING (ADL): LACK_OF_TRANSPORTATION: NO

## 2024-02-08 NOTE — PROGRESS NOTES
"Vancomycin Dosing by Pharmacy- INITIAL    Claudia Coffey is a 68 y.o. year old female who Pharmacy has been consulted for vancomycin dosing for cellulitis, skin and soft tissue. Based on the patient's indication and renal status this patient will be dosed based on a goal AUC of 400-600.     Renal function is currently stable.    Visit Vitals  /73 (Patient Position: Lying)   Pulse 98   Temp 36 °C (96.8 °F) (Temporal)   Resp 18        Lab Results   Component Value Date    CREATININE 0.90 02/07/2024    CREATININE 0.68 07/27/2023    CREATININE 0.67 01/23/2023    CREATININE 0.73 12/01/2022    CREATININE 0.72 07/14/2022        Patient weight is No results found for: \"PTWEIGHT\"    No results found for: \"CULTURE\"     No intake/output data recorded.  [unfilled]    No results found for: \"PATIENTTEMP\"       Assessment/Plan     Patient has already been given a loading dose of 1500 mg.  Will initiate vancomycin maintenance,  750 mg every 12 hours.    This dosing regimen is predicted by [employer] to result in the following pharmacokinetic parameters:    Regimen: 750 mg IV every 12 hours.  Start time: 07:12 on 02/08/2024  Exposure target: AUC24 (range)400-600 mg/L.hr   AUC24,ss: 430 mg/L.hr  Probability of AUC24 > 400: 58 %  Ctrough,ss: 14.3 mg/L  Probability of Ctrough,ss > 20: 20 %  Probability of nephrotoxicity (Lodise ALEX 2009): 9 %    Follow-up level will be ordered on 2/9 at 5a unless clinically indicated sooner.  Will continue to monitor renal function daily while on vancomycin and order serum creatinine at least every 48 hours if not already ordered.  Follow for continued vancomycin needs, clinical response, and signs/symptoms of toxicity.       Art Arana, PharmD       "

## 2024-02-08 NOTE — H&P
History Of Present Illness  Claudia Coffey is a 68 y.o. female presenting with left lower leg redness, pain and swelling. She says she was cleaning out her shed 3 days ago when one of the tacks accidentally punctured her left calf. She says she had jeans on that day. The next day, she noticed a quarter-sized area of redness surrounding the puncture wound. The following day, she developed low grade fever, chills, rigors, nausea, HA, malaise and myalgias. This am, she noticed that the redness has spread considerably to involve practically the whole lower leg with pain, swelling and warmth. Her  brought her to the ED tonight. Labs on presentation are notable for a WBC of 18.6 and a CRP of 26.5. She received a dose of vancomycin and zosyn in the ED and was given a tetanus booster.      Past Medical History  Past Medical History:   Diagnosis Date    Cervical disc disorder, unspecified, unspecified cervical region 11/15/2022    Cervical disc disease    Pain in right shoulder 07/08/2021    Acute pain of right shoulder    Personal history of other diseases of the digestive system     History of fatty infiltration of liver    Personal history of other diseases of the digestive system     History of acute pancreatitis    Personal history of other diseases of the musculoskeletal system and connective tissue     History of osteopenia    Personal history of other endocrine, nutritional and metabolic disease 12/10/2020    History of hyperglycemia    Personal history of other specified conditions     History of balance disorder    Sciatica, unspecified side     Sciatic pain       Past Surgical History  Past Surgical History:   Procedure Laterality Date    OTHER SURGICAL HISTORY  07/28/2020    Dilation and curettage    OTHER SURGICAL HISTORY  07/28/2020    Ankle surgery    OTHER SURGICAL HISTORY  07/28/2020    Biopsy    OTHER SURGICAL HISTORY  07/28/2020    Colonoscopy    OTHER SURGICAL HISTORY  07/28/2020    Tubal ligation         Social History  She reports that she quit smoking about 23 years ago. Her smoking use included cigarettes. She started smoking about 53 years ago. She has a 30.00 pack-year smoking history. She has never used smokeless tobacco. She reports that she does not drink alcohol and does not use drugs.    Family History   · FHx: early MI (V17.3) (Z82.49)     · Family history of lung cancer (V16.1) (Z80.1)     · Family history of cerebellar ataxia (V17.2) (Z82.0)     · Family history of cerebellar ataxia (V17.2) (Z82.0)     Allergies  Shellfish containing products    Review of Systems   Constitutional:         See  HPI   HENT: Negative.     Eyes: Negative.    Respiratory: Negative.     Cardiovascular:  Positive for leg swelling. Negative for chest pain and palpitations.   Gastrointestinal:  Positive for nausea. Negative for abdominal distention, abdominal pain, anal bleeding, constipation and vomiting.   Endocrine: Negative.    Genitourinary: Negative.    Musculoskeletal:  Positive for myalgias.   Skin:         See HPI   Allergic/Immunologic: Negative.    Neurological: Negative.    Hematological: Negative.    Psychiatric/Behavioral: Negative.          Physical Exam  Constitutional:       General: She is not in acute distress.     Appearance: She is obese. She is not ill-appearing, toxic-appearing or diaphoretic.   HENT:      Head: Normocephalic and atraumatic.      Nose: Nose normal.      Mouth/Throat:      Mouth: Mucous membranes are dry.      Pharynx: Oropharynx is clear. No oropharyngeal exudate or posterior oropharyngeal erythema.   Eyes:      General: No scleral icterus.        Right eye: No discharge.         Left eye: No discharge.      Conjunctiva/sclera: Conjunctivae normal.   Cardiovascular:      Rate and Rhythm: Normal rate and regular rhythm.      Heart sounds: Normal heart sounds. No murmur heard.  Pulmonary:      Breath sounds: Normal breath sounds. No wheezing, rhonchi or rales.   Abdominal:       "General: There is no distension.      Palpations: Abdomen is soft. There is no mass.      Tenderness: There is no abdominal tenderness. There is no right CVA tenderness or left CVA tenderness.   Musculoskeletal:         General: Swelling and tenderness present.      Cervical back: Neck supple.      Right lower leg: No edema.      Left lower leg: Edema present.   Lymphadenopathy:      Cervical: No cervical adenopathy.   Skin:     General: Skin is warm and dry.      Findings: Erythema present.      Comments: Left lower leg is erythematous, swollen, tender and warm to touch. On the medial aspect of the calf is an open area draining serous liquid that is the puncture wound and obvious portal of entry   Neurological:      General: No focal deficit present.      Mental Status: She is alert and oriented to person, place, and time.   Psychiatric:         Mood and Affect: Mood normal.         Behavior: Behavior normal.          Last Recorded Vitals  Blood pressure 118/62, pulse 99, temperature (!) 38 °C (100.4 °F), resp. rate 18, height 1.676 m (5' 6\"), weight 89.4 kg (197 lb), SpO2 (!) 89 %.    Relevant Results   Latest Reference Range & Units 02/07/24 18:42 02/07/24 18:56 02/07/24 20:24   GLUCOSE 74 - 99 mg/dL 107 (H)     SODIUM 136 - 145 mmol/L 133 (L)     POTASSIUM 3.5 - 5.3 mmol/L 3.6     CHLORIDE 98 - 107 mmol/L 98     Bicarbonate 21 - 32 mmol/L 24     Anion Gap 10 - 20 mmol/L 15     Blood Urea Nitrogen 6 - 23 mg/dL 16     Creatinine 0.50 - 1.05 mg/dL 0.90     EGFR >60 mL/min/1.73m*2 71     Calcium 8.6 - 10.3 mg/dL 9.3     Albumin 3.4 - 5.0 g/dL 4.3     Alkaline Phosphatase 33 - 136 U/L 67     ALT 7 - 45 U/L 45     AST 9 - 39 U/L 34     Bilirubin Total 0.0 - 1.2 mg/dL 0.8     Total Protein 6.4 - 8.2 g/dL 7.9     Lactate 0.4 - 2.0 mmol/L 1.6     C-Reactive Protein <1.00 mg/dL 26.49 (H)     INR 0.9 - 1.1    1.4 (H)   Protime 9.8 - 12.8 seconds   15.3 (H)   aPTT 27 - 38 seconds   31   WBC 4.4 - 11.3 x10*3/uL 18.6 (H)   "   nRBC 0.0 - 0.0 /100 WBCs 0.0     RBC 4.00 - 5.20 x10*6/uL 4.93     HEMOGLOBIN 12.0 - 16.0 g/dL 14.0     HEMATOCRIT 36.0 - 46.0 % 42.1     MCV 80 - 100 fL 85     MCH 26.0 - 34.0 pg 28.4     MCHC 32.0 - 36.0 g/dL 33.3     RED CELL DISTRIBUTION WIDTH 11.5 - 14.5 % 14.2     Platelets 150 - 450 x10*3/uL 283     Neutrophils % 40.0 - 80.0 % 87.2     Immature Granulocytes %, Automated 0.0 - 0.9 % 0.4     Lymphocytes % 13.0 - 44.0 % 6.3     Monocytes % 2.0 - 10.0 % 5.6     Eosinophils % 0.0 - 6.0 % 0.3     Basophils % 0.0 - 2.0 % 0.2     Neutrophils Absolute 1.20 - 7.70 x10*3/uL 16.23 (H)     Immature Granulocytes Absolute, Automated 0.00 - 0.70 x10*3/uL 0.07     Lymphocytes Absolute 1.20 - 4.80 x10*3/uL 1.17 (L)     Monocytes Absolute 0.10 - 1.00 x10*3/uL 1.04 (H)     Eosinophils Absolute 0.00 - 0.70 x10*3/uL 0.05     Basophils Absolute 0.00 - 0.10 x10*3/uL 0.03     Sed Rate 0 - 30 mm/h 36 (H)     BLOOD CULTURE  Rpt  Rpt     Flu A Result Not Detected   Not Detected    Flu B Result Not Detected   Not Detected    Coronavirus 2019, PCR Not Detected   Not Detected    (H): Data is abnormally high  (L): Data is abnormally low  Rpt: View report in Results Review for more information     Assessment/Plan   LLE cellulitis with sepsis  Secondary to left calf puncture wound (portal of entry)  Admit to medical floor  Continue IV Vanco and Zosyn  IVF  Elevated LLE  Pain control  ID to see    Hyponatremia  Borderline  Likely related to hypovolemia secondary to decreased oral intake  Volume expansion with NS and will recheck level in am    Asthma  Clinically stable  Resume home meds -- symbicort, singulair and as needed MDI albuterol      I spent 60 minutes in the professional and overall care of this patient.      Pretty De La Vega MD

## 2024-02-08 NOTE — CONSULTS
Consults  Referred by ANSELMO De La Vega    Primary MD: Solitario Cole, DO    Reason For Consult  cellulitis    History Of Present Illness  Claudia Coffey is a 68 y.o. female, hx of a puncture wound to the Lt calf by an old eric nail 3 days PTA, she has noticed increasing pain and redness around the puncture site slowly, the day of admission she has developed fever, chills, bodyaches, increasiing pain and redness in the Lt leg below the knee, minimal drainage from the puncture site, the WBC are elevated, the xray was negative, no sob, no chest pain, her last tetanus vaccine was in the 70s.     Past Medical History  She has a past medical history of Cervical disc disorder, unspecified, unspecified cervical region (11/15/2022), Pain in right shoulder (2021), Personal history of other diseases of the digestive system, Personal history of other diseases of the digestive system, Personal history of other diseases of the musculoskeletal system and connective tissue, Personal history of other endocrine, nutritional and metabolic disease (12/10/2020), Personal history of other specified conditions, and Sciatica, unspecified side.    Surgical History  She has a past surgical history that includes Other surgical history (2020); Other surgical history (2020); Other surgical history (2020); Other surgical history (2020); and Other surgical history (2020).     Social History     Occupational History    Not on file   Tobacco Use    Smoking status: Former     Packs/day: 1.00     Years: 30.00     Additional pack years: 0.00     Total pack years: 30.00     Types: Cigarettes     Start date:      Quit date:      Years since quittin.1    Smokeless tobacco: Never   Substance and Sexual Activity    Alcohol use: Never    Drug use: Never    Sexual activity: Not on file     Travel History   Travel since 26    No documented travel since 24          Family History  No family history on file.,  no immunodeficiency  Allergies  Shellfish containing products     There is no immunization history for the selected administration types on file for this patient.  Pneumonia and influenza vaccines are planned  Ward fall risk 35, preventive protocol was implemented  Depression screen is negative    Medications  Home medications:  Medications Prior to Admission   Medication Sig Dispense Refill Last Dose    albuterol 90 mcg/actuation inhaler inhale 1 to 2 puffs by mouth and INTO THE LUNGS every 4 to 6 hours if needed   More than a month    budesonide-formoteroL (Symbicort) 160-4.5 mcg/actuation inhaler Inhale 2 puffs 2 times a day. Rinse mouth with water after use to reduce aftertaste and incidence of candidiasis. Do not swallow. 6 g 11 Past Week    fluticasone (Flonase) 50 mcg/actuation nasal spray use as directed   2/6/2024    montelukast (Singulair) 10 mg tablet Take 1 tablet (10 mg) by mouth once daily at bedtime. (Patient not taking: Reported on 2/7/2024) 90 tablet 3 More than a month    multivitamin with minerals (multivit-min-iron fum-folic ac) tablet Take 1 tablet by mouth once daily.   2/6/2024    rosuvastatin (Crestor) 5 mg tablet Take 1 tablet (5 mg) by mouth once daily. 90 tablet 3 2/6/2024     Current medications:  Scheduled medications  diphth,pertus(acell),tetanus, 0.5 mL, intramuscular, During hospitalization  docusate sodium, 100 mg, oral, BID  enoxaparin, 40 mg, subcutaneous, q24h  fluticasone furoate-vilanteroL, 1 puff, inhalation, Daily  melatonin, 3 mg, oral, Daily  montelukast, 10 mg, oral, Nightly  multivitamin with minerals, 1 tablet, oral, Daily  piperacillin-tazobactam, 3.375 g, intravenous, q6h  rosuvastatin, 5 mg, oral, Daily  vancomycin, 750 mg, intravenous, q12h      Continuous medications  sodium chloride 0.9%, 85 mL/hr, Last Rate: 85 mL/hr (02/08/24 0602)      PRN medications  PRN medications: acetaminophen **OR** acetaminophen **OR** acetaminophen, acetaminophen, albuterol, alum-mag  "hydroxide-simeth, calcium carbonate, dextromethorphan-guaifenesin, guaiFENesin, ondansetron **OR** ondansetron    Review of Systems   All other systems reviewed and are negative.       Objective  Range of Vitals (last 24 hours)  Heart Rate:  []   Temp:  [36 °C (96.8 °F)-38 °C (100.4 °F)]   Resp:  [16-20]   BP: (101-145)/(49-89)   Height:  [167.6 cm (5' 6\")]   Weight:  [88 kg (194 lb)-89.4 kg (197 lb)]   SpO2:  [89 %-95 %]   Daily Weight  02/07/24 : 88 kg (194 lb)    Body mass index is 31.31 kg/m².   Nutritional consult    Physical Exam  Constitutional:       Appearance: Normal appearance.   HENT:      Head: Normocephalic and atraumatic.      Mouth/Throat:      Mouth: Mucous membranes are moist.      Pharynx: Oropharynx is clear.   Eyes:      Pupils: Pupils are equal, round, and reactive to light.   Cardiovascular:      Rate and Rhythm: Normal rate and regular rhythm.      Heart sounds: Normal heart sounds.   Pulmonary:      Effort: Pulmonary effort is normal.      Breath sounds: Normal breath sounds.   Abdominal:      General: Abdomen is flat. Bowel sounds are normal.      Palpations: Abdomen is soft.   Musculoskeletal:      Cervical back: Normal range of motion.      Comments: Lt leg edema, redness, tender below the knee, dry puncture wound in the mid medial calf, no necrosis, no rash   Neurological:      Mental Status: She is alert.          Relevant Results  Outside Hospital Results  reviewed  Labs  Results from last 72 hours   Lab Units 02/08/24  0641 02/07/24  1842   WBC AUTO x10*3/uL 15.0* 18.6*   HEMOGLOBIN g/dL 12.5 14.0   HEMATOCRIT % 37.9 42.1   PLATELETS AUTO x10*3/uL 243 283   NEUTROS PCT AUTO %  --  87.2   LYMPHS PCT AUTO %  --  6.3   MONOS PCT AUTO %  --  5.6   EOS PCT AUTO %  --  0.3     Results from last 72 hours   Lab Units 02/08/24  0641 02/07/24  1842   SODIUM mmol/L 133* 133*   POTASSIUM mmol/L 3.7 3.6   CHLORIDE mmol/L 99 98   CO2 mmol/L 24 24   BUN mg/dL 13 16   CREATININE mg/dL 0.80 0.90 " "  GLUCOSE mg/dL 118* 107*   CALCIUM mg/dL 8.4* 9.3   ANION GAP mmol/L 14 15   EGFR mL/min/1.73m*2 81 71     Results from last 72 hours   Lab Units 02/07/24  1842   ALK PHOS U/L 67   BILIRUBIN TOTAL mg/dL 0.8   PROTEIN TOTAL g/dL 7.9   ALT U/L 45   AST U/L 34   ALBUMIN g/dL 4.3     Estimated Creatinine Clearance: 77.3 mL/min (by C-G formula based on SCr of 0.8 mg/dL).  C-Reactive Protein   Date Value Ref Range Status   02/07/2024 26.49 (H) <1.00 mg/dL Final     CRP   Date Value Ref Range Status   12/05/2021 0.70 mg/dL Final     Comment:     REF VALUE  < 1.00     12/04/2021 0.91 mg/dL Final     Comment:     REF VALUE  < 1.00       Sedimentation Rate   Date Value Ref Range Status   02/07/2024 36 (H) 0 - 30 mm/h Final     No results found for: \"HIV1X2\", \"HIVCONF\", \"GJMPXN9XA\"  No results found for: \"HEPCABINIT\", \"HEPCAB\", \"HCVPCRQUANT\"  Microbiology  Reviewed  Imaging  Reviewed       Assessment/Plan   Left calf puncture wound / Lt leg cellulitis  Leukocytosis    Recommendations :  Continue Zosyn and Vancomycin  Cultures  Keep the leg elevated  CT Lt calf  Tetanus booster    I spent minutes in the professional and overall care of this patient.      Rashi Hinkle MD  "

## 2024-02-08 NOTE — PROGRESS NOTES
02/08/24 1325   Discharge Planning   Living Arrangements Spouse/significant other   Support Systems Spouse/significant other;Children   Assistance Needed Alert and oriented x 3, Independent with ADL's, Drives, No DME used   Type of Residence Private residence   Number of Stairs to Enter Residence 0   Number of Stairs Within Residence 0   Do you have animals or pets at home? Yes   Type of Animals or Pets 1 cat   Who is requesting discharge planning? Provider   Home or Post Acute Services None   Patient expects to be discharged to: Home with spouse with no discharge needs identified   Does the patient need discharge transport arranged? No   Financial Resource Strain   How hard is it for you to pay for the very basics like food, housing, medical care, and heating? Not hard   Housing Stability   In the last 12 months, was there a time when you were not able to pay the mortgage or rent on time? N   In the last 12 months, how many places have you lived? 1   In the last 12 months, was there a time when you did not have a steady place to sleep or slept in a shelter (including now)? N   Transportation Needs   In the past 12 months, has lack of transportation kept you from medical appointments or from getting medications? no   In the past 12 months, has lack of transportation kept you from meetings, work, or from getting things needed for daily living? No   Patient Choice   Provider Choice list and CMS website (https://medicare.gov/care-compare#search) for post-acute Quality and Resource Measure Data were provided and reviewed with: Patient   Patient / Family choosing to utilize agency / facility established prior to hospitalization No        02/09/24 1426   Discharge Planning   Living Arrangements Spouse/significant other   Support Systems Spouse/significant other   Assistance Needed Alert and oriented x 3, Independent with ADL's, Drives, No DME used   Type of Residence Private residence   Do you have animals or pets at  home? Yes   Type of Animals or Pets Cat   Who is requesting discharge planning? Provider   Home or Post Acute Services None   Patient expects to be discharged to: Home with spouse and no discharge needs identified   Does the patient need discharge transport arranged? No   Patient Choice   Provider Choice list and CMS website (https://medicare.gov/care-compare#search) for post-acute Quality and Resource Measure Data were provided and reviewed with: Patient   Patient / Family choosing to utilize agency / facility established prior to hospitalization No

## 2024-02-08 NOTE — PROGRESS NOTES
Claudia Coffey is a 68 y.o. female on day 0 of admission presenting with Cellulitis.      Subjective   Confirms that she did not get tetanus booster in ED.  Discussed with RN and will give today.  Pain in LLE under control.  She is able to walk on it.  Denies systemic symptoms this morning, no fevers or chills.  Tolerated breakfast.  No diarrhea/constipation.       Objective     Last Recorded Vitals  /66   Pulse 97   Temp 36.2 °C (97.2 °F)   Resp 16   Wt 88 kg (194 lb)   SpO2 95%   Intake/Output last 3 Shifts:    Intake/Output Summary (Last 24 hours) at 2/8/2024 1052  Last data filed at 2/8/2024 0720  Gross per 24 hour   Intake 996.42 ml   Output --   Net 996.42 ml       Admission Weight  Weight: 89.4 kg (197 lb) (02/07/24 1740)    Daily Weight  02/07/24 : 88 kg (194 lb)    Image Results  XR chest 1 view  Narrative: STUDY:  Chest Radiograph;  02/07/2024 6:22 PM   INDICATION:  Cough, fever.    COMPARISON:  XR chest 12/03/2021.  tachycardia chest lung cancer screen 12/01/2022.     ACCESSION NUMBER(S):  [id number]  ORDERING CLINICIAN:  BELEN DIXON  TECHNIQUE:  Frontal chest was obtained at 1822 hours.  FINDINGS:  CARDIOMEDIASTINAL SILHOUETTE:  Cardiomediastinal silhouette is normal in size and configuration.     LUNGS:  Lungs are clear.     ABDOMEN:  No remarkable upper abdominal findings.     BONES:  No acute osseous changes.  Impression: No radiographic evidence of acute cardiopulmonary disease.  Signed by Jorge Kim MD  XR tibia fibula left 2 views  Narrative: STUDY:  Tibia and Fibula Radiographs; 2/7/2024, 6:22PM  INDICATION:  Possible foreign body with infection in left lower extremity.   Evaluate for osteomyelitis.  COMPARISON:  None Available.  ACCESSION NUMBER(S):  [id number]  ORDERING CLINICIAN:  BELEN DIXON  TECHNIQUE:  Two view(s) of the left tibia and fibula.  FINDINGS:    There is no displaced fracture.  The alignment is anatomic.  No soft  tissue abnormality is seen.  Impression:  No acute osseous abnormalities.  No radiographic evidence of foreign body.  No periosteal new bone or bony lysis to suggest osteomyelitis.  Signed by Jorge Kim MD      Physical Exam  Physical Exam  Gen: NAD  Eyes:  EOM intact  ENT: MMM  Neck: No JVD  Respiratory: CTAB, no wheezes/rhonchi  Cardiac: RRR, no murmurs rubs or gallops  Abdomen: soft, NT, +BS  Extremities: LLE with edema, erythema, TTP below knee, puncture wound on calf, no drainage  Neuro: No focal deficits, alert and oriented x 3  Psych:  appropriate mood and behavior      Assessment/Plan      Principal Problem:    Cellulitis  -leukocytosis  -will get Boostrix this morning  -elevate  -continue Vanc/zosyn  -IVF  -pain control  -appreciate ID recs, CT LLE today    Hyponatremia  -stable    Allergies  -flonase  -singulair    HLD  -crestor    DVT prophy  -lovenox    Dispo:  rec inpatient admission for cellulitis  D/w Dr. Oskar Ramirez, PA-C

## 2024-02-09 PROBLEM — L03.116 CELLULITIS OF LEFT LOWER EXTREMITY: Status: ACTIVE | Noted: 2024-02-09

## 2024-02-09 LAB
ALBUMIN SERPL BCP-MCNC: 3.6 G/DL (ref 3.4–5)
ALP SERPL-CCNC: 87 U/L (ref 33–136)
ALT SERPL W P-5'-P-CCNC: 40 U/L (ref 7–45)
ANION GAP SERPL CALC-SCNC: 14 MMOL/L (ref 10–20)
AST SERPL W P-5'-P-CCNC: 30 U/L (ref 9–39)
ATRIAL RATE: 98 BPM
BILIRUB SERPL-MCNC: 0.8 MG/DL (ref 0–1.2)
BUN SERPL-MCNC: 11 MG/DL (ref 6–23)
CALCIUM SERPL-MCNC: 8.4 MG/DL (ref 8.6–10.3)
CHLORIDE SERPL-SCNC: 103 MMOL/L (ref 98–107)
CO2 SERPL-SCNC: 23 MMOL/L (ref 21–32)
CREAT SERPL-MCNC: 0.62 MG/DL (ref 0.5–1.05)
CRP SERPL-MCNC: 12.9 MG/DL
EGFRCR SERPLBLD CKD-EPI 2021: >90 ML/MIN/1.73M*2
ERYTHROCYTE [DISTWIDTH] IN BLOOD BY AUTOMATED COUNT: 14.1 % (ref 11.5–14.5)
GLUCOSE SERPL-MCNC: 118 MG/DL (ref 74–99)
HCT VFR BLD AUTO: 38.1 % (ref 36–46)
HGB BLD-MCNC: 12.6 G/DL (ref 12–16)
MCH RBC QN AUTO: 28.6 PG (ref 26–34)
MCHC RBC AUTO-ENTMCNC: 33.1 G/DL (ref 32–36)
MCV RBC AUTO: 87 FL (ref 80–100)
NRBC BLD-RTO: 0 /100 WBCS (ref 0–0)
P AXIS: 46 DEGREES
P OFFSET: 200 MS
P ONSET: 151 MS
PLATELET # BLD AUTO: 273 X10*3/UL (ref 150–450)
POTASSIUM SERPL-SCNC: 3.6 MMOL/L (ref 3.5–5.3)
PR INTERVAL: 138 MS
PROT SERPL-MCNC: 6.9 G/DL (ref 6.4–8.2)
Q ONSET: 220 MS
QRS COUNT: 16 BEATS
QRS DURATION: 92 MS
QT INTERVAL: 354 MS
QTC CALCULATION(BAZETT): 451 MS
QTC FREDERICIA: 417 MS
R AXIS: -5 DEGREES
RBC # BLD AUTO: 4.4 X10*6/UL (ref 4–5.2)
SODIUM SERPL-SCNC: 136 MMOL/L (ref 136–145)
STAPHYLOCOCCUS SPEC CULT: NORMAL
T AXIS: 36 DEGREES
T OFFSET: 397 MS
VANCOMYCIN SERPL-MCNC: 29 UG/ML (ref 5–20)
VENTRICULAR RATE: 98 BPM
WBC # BLD AUTO: 10.3 X10*3/UL (ref 4.4–11.3)

## 2024-02-09 PROCEDURE — 85027 COMPLETE CBC AUTOMATED: CPT | Performed by: PHYSICIAN ASSISTANT

## 2024-02-09 PROCEDURE — 2500000004 HC RX 250 GENERAL PHARMACY W/ HCPCS (ALT 636 FOR OP/ED): Performed by: INTERNAL MEDICINE

## 2024-02-09 PROCEDURE — 36415 COLL VENOUS BLD VENIPUNCTURE: CPT | Performed by: INTERNAL MEDICINE

## 2024-02-09 PROCEDURE — 80053 COMPREHEN METABOLIC PANEL: CPT | Performed by: NURSE PRACTITIONER

## 2024-02-09 PROCEDURE — 99232 SBSQ HOSP IP/OBS MODERATE 35: CPT | Performed by: INTERNAL MEDICINE

## 2024-02-09 PROCEDURE — 86140 C-REACTIVE PROTEIN: CPT | Performed by: INTERNAL MEDICINE

## 2024-02-09 PROCEDURE — 2500000004 HC RX 250 GENERAL PHARMACY W/ HCPCS (ALT 636 FOR OP/ED): Performed by: NURSE PRACTITIONER

## 2024-02-09 PROCEDURE — 80202 ASSAY OF VANCOMYCIN: CPT | Performed by: INTERNAL MEDICINE

## 2024-02-09 PROCEDURE — 90715 TDAP VACCINE 7 YRS/> IM: CPT | Performed by: NURSE PRACTITIONER

## 2024-02-09 PROCEDURE — 2500000001 HC RX 250 WO HCPCS SELF ADMINISTERED DRUGS (ALT 637 FOR MEDICARE OP): Performed by: NURSE PRACTITIONER

## 2024-02-09 PROCEDURE — 2500000001 HC RX 250 WO HCPCS SELF ADMINISTERED DRUGS (ALT 637 FOR MEDICARE OP): Performed by: INTERNAL MEDICINE

## 2024-02-09 PROCEDURE — 90471 IMMUNIZATION ADMIN: CPT | Performed by: NURSE PRACTITIONER

## 2024-02-09 PROCEDURE — 1200000002 HC GENERAL ROOM WITH TELEMETRY DAILY

## 2024-02-09 RX ADMIN — VANCOMYCIN HYDROCHLORIDE 750 MG: 750 INJECTION, SOLUTION INTRAVENOUS at 06:38

## 2024-02-09 RX ADMIN — DOCUSATE SODIUM 100 MG: 100 CAPSULE, LIQUID FILLED ORAL at 09:02

## 2024-02-09 RX ADMIN — TETANUS TOXOID, REDUCED DIPHTHERIA TOXOID AND ACELLULAR PERTUSSIS VACCINE, ADSORBED 0.5 ML: 5; 2.5; 8; 8; 2.5 SUSPENSION INTRAMUSCULAR at 10:28

## 2024-02-09 RX ADMIN — ACETAMINOPHEN 650 MG: 325 TABLET ORAL at 23:15

## 2024-02-09 RX ADMIN — PIPERACILLIN SODIUM AND TAZOBACTAM SODIUM 3.38 G: 3; .375 INJECTION, SOLUTION INTRAVENOUS at 12:02

## 2024-02-09 RX ADMIN — PIPERACILLIN SODIUM AND TAZOBACTAM SODIUM 3.38 G: 3; .375 INJECTION, SOLUTION INTRAVENOUS at 18:59

## 2024-02-09 RX ADMIN — Medication 1 TABLET: at 09:02

## 2024-02-09 RX ADMIN — PIPERACILLIN SODIUM AND TAZOBACTAM SODIUM 3.38 G: 3; .375 INJECTION, SOLUTION INTRAVENOUS at 06:07

## 2024-02-09 RX ADMIN — ENOXAPARIN SODIUM 40 MG: 40 INJECTION SUBCUTANEOUS at 23:14

## 2024-02-09 RX ADMIN — PIPERACILLIN SODIUM AND TAZOBACTAM SODIUM 3.38 G: 3; .375 INJECTION, SOLUTION INTRAVENOUS at 23:16

## 2024-02-09 RX ADMIN — ROSUVASTATIN CALCIUM 5 MG: 10 TABLET, FILM COATED ORAL at 09:02

## 2024-02-09 RX ADMIN — Medication 3 MG: at 21:01

## 2024-02-09 ASSESSMENT — PAIN SCALES - GENERAL
PAINLEVEL_OUTOF10: 1
PAINLEVEL_OUTOF10: 3

## 2024-02-09 ASSESSMENT — COGNITIVE AND FUNCTIONAL STATUS - GENERAL
MOBILITY SCORE: 24
DAILY ACTIVITIY SCORE: 24
DAILY ACTIVITIY SCORE: 24
MOBILITY SCORE: 24

## 2024-02-09 ASSESSMENT — PAIN - FUNCTIONAL ASSESSMENT
PAIN_FUNCTIONAL_ASSESSMENT: 0-10
PAIN_FUNCTIONAL_ASSESSMENT: 0-10

## 2024-02-09 NOTE — CARE PLAN
The patient's goals for the shift include      The clinical goals for the shift include to remain comfortable through out the shift

## 2024-02-09 NOTE — CONSULTS
Vancomycin Dosing by Pharmacy- Cessation of Therapy    Consult to pharmacy for vancomycin dosing has been discontinued by the prescriber, pharmacy will sign off at this time.    Please call pharmacy if there are further questions or re-enter a consult if vancomycin is resumed.     Debbie He, PharmD

## 2024-02-09 NOTE — PROGRESS NOTES
Claudia Coffey is a 68 y.o. female on day 0 of admission presenting with Cellulitis.    Subjective   Interval History: no fever, no new complaints        Review of Systems    Objective   Range of Vitals (last 24 hours)  Heart Rate:  [72-86]   Temp:  [36.2 °C (97.2 °F)-37.3 °C (99.1 °F)]   Resp:  [14-16]   BP: (112-128)/(62-73)   SpO2:  [92 %-94 %]   Daily Weight  02/07/24 : 88 kg (194 lb)    Body mass index is 31.31 kg/m².    Physical Exam  Constitutional:       Appearance: Normal appearance.   HENT:      Head: Normocephalic and atraumatic.      Mouth/Throat:      Mouth: Mucous membranes are moist.      Pharynx: Oropharynx is clear.   Eyes:      Pupils: Pupils are equal, round, and reactive to light.   Cardiovascular:      Rate and Rhythm: Normal rate and regular rhythm.      Heart sounds: Normal heart sounds.   Pulmonary:      Effort: Pulmonary effort is normal.      Breath sounds: Normal breath sounds.   Abdominal:      General: Abdomen is flat. Bowel sounds are normal.      Palpations: Abdomen is soft.   Musculoskeletal:      Cervical back: Normal range of motion.      Comments: Lt leg edema and redness   Neurological:      Mental Status: She is alert.         Antibiotics  vancomycin (Vancocin) in dextrose 5 % water (D5W) 500 mL IV 1,500 mg  piperacillin-tazobactam-dextrose (Zosyn) IV 3.375 g  diphth,pertus(acell),tetanus (BoostRIX) 2.5-8-5 Lf-mcg-Lf/0.5mL vaccine 0.5 mL  acetaminophen (Tylenol) tablet 975 mg  enoxaparin (Lovenox) syringe 40 mg  acetaminophen (Tylenol) tablet 650 mg  melatonin tablet 3 mg  docusate sodium (Colace) capsule 100 mg  piperacillin-tazobactam-dextrose (Zosyn) IV 3.375 g  sodium chloride 0.9% infusion  albuterol 90 mcg/actuation inhaler 2 puff  montelukast (Singulair) tablet 10 mg  multivitamin with minerals 1 tablet  rosuvastatin (Crestor) tablet 5 mg  alum-mag hydroxide-simeth (Mylanta) 200-200-20 mg/5 mL oral suspension 30 mL  calcium carbonate (Tums) chewable tablet 500  mg  acetaminophen (Tylenol) tablet 650 mg  acetaminophen (Tylenol) oral liquid 650 mg  acetaminophen (Tylenol) suppository 650 mg  docusate sodium (Colace) capsule 100 mg  dextromethorphan-guaifenesin (Robitussin DM)  mg/5 mL oral liquid 5 mL  guaiFENesin (Mucinex) 12 hr tablet 600 mg  enoxaparin (Lovenox) syringe 40 mg  ondansetron (Zofran) tablet 4 mg  ondansetron (Zofran) injection 4 mg  vancomycin in dextrose 5 % (Vancocin) IVPB 750 mg  budesonide-formoteroL (Symbicort) 160-4.5 mcg/actuation inhaler 2 puff  fluticasone furoate-vilanteroL (Breo Ellipta) 200-25 mcg/dose inhaler 1 puff  iohexol (OMNIPaque) 350 mg iodine/mL solution 75 mL      Relevant Results  Labs  Results from last 72 hours   Lab Units 02/09/24  0705 02/08/24  0641 02/07/24  1842   WBC AUTO x10*3/uL 10.3 15.0* 18.6*   HEMOGLOBIN g/dL 12.6 12.5 14.0   HEMATOCRIT % 38.1 37.9 42.1   PLATELETS AUTO x10*3/uL 273 243 283   NEUTROS PCT AUTO %  --   --  87.2   LYMPHS PCT AUTO %  --   --  6.3   MONOS PCT AUTO %  --   --  5.6   EOS PCT AUTO %  --   --  0.3     Results from last 72 hours   Lab Units 02/09/24  0705 02/08/24  0641 02/07/24  1842   SODIUM mmol/L 136 133* 133*   POTASSIUM mmol/L 3.6 3.7 3.6   CHLORIDE mmol/L 103 99 98   CO2 mmol/L 23 24 24   BUN mg/dL 11 13 16   CREATININE mg/dL 0.62 0.80 0.90   GLUCOSE mg/dL 118* 118* 107*   CALCIUM mg/dL 8.4* 8.4* 9.3   ANION GAP mmol/L 14 14 15   EGFR mL/min/1.73m*2 >90 81 71     Results from last 72 hours   Lab Units 02/09/24  0705 02/07/24  1842   ALK PHOS U/L 87 67   BILIRUBIN TOTAL mg/dL 0.8 0.8   PROTEIN TOTAL g/dL 6.9 7.9   ALT U/L 40 45   AST U/L 30 34   ALBUMIN g/dL 3.6 4.3     Estimated Creatinine Clearance: 99.8 mL/min (by C-G formula based on SCr of 0.62 mg/dL).  C-Reactive Protein   Date Value Ref Range Status   02/09/2024 12.90 (H) <1.00 mg/dL Final   02/07/2024 26.49 (H) <1.00 mg/dL Final     CRP   Date Value Ref Range Status   12/05/2021 0.70 mg/dL Final     Comment:     REF VALUE  <  1.00       Microbiology  Reviewed  Imaging  reviewed        Assessment/Plan   Left calf puncture wound / Lt leg cellulitis, ct without foreign body or abscess  Leukocytosis, resolved     Recommendations :  Continue Sofia      I spent minutes in the professional and overall care of this patient.      Rashi Hinkle MD

## 2024-02-09 NOTE — PROGRESS NOTES
Claudia Coffey is a 68 y.o. female on day 0 of admission presenting with Cellulitis.      Subjective   Thinks it is feeling a little better, itchy. Had one loose stool. No chills. Appetite seems to be returning slowly       Objective     Last Recorded Vitals  /75 (Patient Position: Sitting)   Pulse 77   Temp 35.9 °C (96.6 °F) (Temporal)   Resp 18   Wt 88 kg (194 lb)   SpO2 93%   Intake/Output last 3 Shifts:    Intake/Output Summary (Last 24 hours) at 2/9/2024 1239  Last data filed at 2/9/2024 1037  Gross per 24 hour   Intake 450 ml   Output --   Net 450 ml       Admission Weight  Weight: 89.4 kg (197 lb) (02/07/24 1740)    Daily Weight  02/07/24 : 88 kg (194 lb)    Image Results  ECG 12 lead  Normal sinus rhythm  Incomplete right bundle branch block  Inferior infarct (cited on or before 21-NOV-2015)  Cannot rule out Anterior infarct (cited on or before 21-MAR-2008)  Abnormal ECG  When compared with ECG of 21-NOV-2015 19:10,  No significant change was found  CT tibia fibula left w IV contrast  Narrative: Interpreted By:  Leeanna French,   STUDY:  CT TIBIA FIBULA LEFT W IV CONTRAST;  2/8/2024 11:00 am      INDICATION:  Signs/Symptoms:Lt calf puncture wound / cellulitis.      COMPARISON:  None.      ACCESSION NUMBER(S):  [id number]      ORDERING CLINICIAN:  SAYRA SIDDIQUI      TECHNIQUE:  CT imaging of the  left lower leg was obtained  with 75 mL of  Omnipaque 350 intravenous contrast. Coronal and sagittal reformatted  images were performed.      FINDINGS:  OSSEOUS STRUCTURES:  No acute fracture or osseous destruction. Mild osteoarthritis of the  knee with medial compartment joint space narrowing and marginal  osteophytes. Trace knee joint effusion. Partially imaged angle is  grossly maintained without joint effusion. Tiny ossicle inferior to  the medial malleolus.      SOFT TISSUES:  There is mild skin thickening and subcutaneous edema  circumferentially around the lower leg, most prominently along  the  anterolateral pretibial region. No fluid collection. No suspicious  soft tissue mass. Muscle bulk is maintained with preserved  intermuscular fascial planes.      Impression: Cellulitis without CT evidence of abscess, deep compartment infection  or acute osteomyelitis.          MACRO:  None      Signed by: Leeanna French 2/8/2024 12:35 PM  Dictation workstation:   [id number]    No current facility-administered medications on file prior to encounter.     Current Outpatient Medications on File Prior to Encounter   Medication Sig Dispense Refill    albuterol 90 mcg/actuation inhaler inhale 1 to 2 puffs by mouth and INTO THE LUNGS every 4 to 6 hours if needed      budesonide-formoteroL (Symbicort) 160-4.5 mcg/actuation inhaler Inhale 2 puffs 2 times a day. Rinse mouth with water after use to reduce aftertaste and incidence of candidiasis. Do not swallow. 6 g 11    fluticasone (Flonase) 50 mcg/actuation nasal spray use as directed      montelukast (Singulair) 10 mg tablet Take 1 tablet (10 mg) by mouth once daily at bedtime. (Patient not taking: Reported on 2/7/2024) 90 tablet 3    multivitamin with minerals (multivit-min-iron fum-folic ac) tablet Take 1 tablet by mouth once daily.      rosuvastatin (Crestor) 5 mg tablet Take 1 tablet (5 mg) by mouth once daily. 90 tablet 3    [DISCONTINUED] rosuvastatin (Crestor) 5 mg tablet Take 1 tablet (5 mg) by mouth once daily.        Results for orders placed or performed during the hospital encounter of 02/07/24 (from the past 24 hour(s))   Vancomycin   Result Value Ref Range    Vancomycin 29.0 (H) 5.0 - 20.0 ug/mL   CBC   Result Value Ref Range    WBC 10.3 4.4 - 11.3 x10*3/uL    nRBC 0.0 0.0 - 0.0 /100 WBCs    RBC 4.40 4.00 - 5.20 x10*6/uL    Hemoglobin 12.6 12.0 - 16.0 g/dL    Hematocrit 38.1 36.0 - 46.0 %    MCV 87 80 - 100 fL    MCH 28.6 26.0 - 34.0 pg    MCHC 33.1 32.0 - 36.0 g/dL    RDW 14.1 11.5 - 14.5 %    Platelets 273 150 - 450 x10*3/uL   C-reactive protein    Result Value Ref Range    C-Reactive Protein 12.90 (H) <1.00 mg/dL   Comprehensive metabolic panel   Result Value Ref Range    Glucose 118 (H) 74 - 99 mg/dL    Sodium 136 136 - 145 mmol/L    Potassium 3.6 3.5 - 5.3 mmol/L    Chloride 103 98 - 107 mmol/L    Bicarbonate 23 21 - 32 mmol/L    Anion Gap 14 10 - 20 mmol/L    Urea Nitrogen 11 6 - 23 mg/dL    Creatinine 0.62 0.50 - 1.05 mg/dL    eGFR >90 >60 mL/min/1.73m*2    Calcium 8.4 (L) 8.6 - 10.3 mg/dL    Albumin 3.6 3.4 - 5.0 g/dL    Alkaline Phosphatase 87 33 - 136 U/L    Total Protein 6.9 6.4 - 8.2 g/dL    AST 30 9 - 39 U/L    Bilirubin, Total 0.8 0.0 - 1.2 mg/dL    ALT 40 7 - 45 U/L        Physical Exam  Constitutional:       Appearance: Normal appearance.   HENT:      Head: Normocephalic.      Mouth/Throat:      Mouth: Mucous membranes are moist.   Eyes:      Extraocular Movements: Extraocular movements intact.      Pupils: Pupils are equal, round, and reactive to light.   Cardiovascular:      Rate and Rhythm: Normal rate and regular rhythm.      Pulses: Normal pulses.   Pulmonary:      Effort: Pulmonary effort is normal.      Breath sounds: Normal breath sounds.   Abdominal:      General: Bowel sounds are normal.      Palpations: Abdomen is soft.   Musculoskeletal:      Comments: LLE remains slightly edematous. Erythema is darkening, and starting to recede, It is still warm to touch. No drainage, pustules etc.    Skin:     Comments: As above   Neurological:      General: No focal deficit present.      Mental Status: She is alert and oriented to person, place, and time.   Psychiatric:         Mood and Affect: Mood normal.         Behavior: Behavior normal.       Relevant Results         Assessment/Plan        Principal Problem:    Cellulitis    LLE cellulitis, secondary to puncture wound Improving, on zosyn. Afebrile. Will repeat labs in am. May be able to transition to  oral antibiotics if ok with Dr cheung in am.   Hyponatremia, resolved  Hyperlipidemia.  Continue statin  Hopefully home in am              Keerthi Schmitt MD

## 2024-02-10 LAB
BASOPHILS # BLD AUTO: 0.02 X10*3/UL (ref 0–0.1)
BASOPHILS NFR BLD AUTO: 0.2 %
CRP SERPL-MCNC: 6.5 MG/DL
EOSINOPHIL # BLD AUTO: 0.3 X10*3/UL (ref 0–0.7)
EOSINOPHIL NFR BLD AUTO: 3.7 %
ERYTHROCYTE [DISTWIDTH] IN BLOOD BY AUTOMATED COUNT: 14 % (ref 11.5–14.5)
ERYTHROCYTE [SEDIMENTATION RATE] IN BLOOD BY WESTERGREN METHOD: 79 MM/H (ref 0–30)
HCT VFR BLD AUTO: 37.6 % (ref 36–46)
HGB BLD-MCNC: 12.4 G/DL (ref 12–16)
IMM GRANULOCYTES # BLD AUTO: 0.03 X10*3/UL (ref 0–0.7)
IMM GRANULOCYTES NFR BLD AUTO: 0.4 % (ref 0–0.9)
LYMPHOCYTES # BLD AUTO: 1.83 X10*3/UL (ref 1.2–4.8)
LYMPHOCYTES NFR BLD AUTO: 22.6 %
MCH RBC QN AUTO: 28.4 PG (ref 26–34)
MCHC RBC AUTO-ENTMCNC: 33 G/DL (ref 32–36)
MCV RBC AUTO: 86 FL (ref 80–100)
MONOCYTES # BLD AUTO: 0.84 X10*3/UL (ref 0.1–1)
MONOCYTES NFR BLD AUTO: 10.4 %
NEUTROPHILS # BLD AUTO: 5.07 X10*3/UL (ref 1.2–7.7)
NEUTROPHILS NFR BLD AUTO: 62.7 %
NRBC BLD-RTO: 0 /100 WBCS (ref 0–0)
PLATELET # BLD AUTO: 337 X10*3/UL (ref 150–450)
RBC # BLD AUTO: 4.36 X10*6/UL (ref 4–5.2)
WBC # BLD AUTO: 8.1 X10*3/UL (ref 4.4–11.3)

## 2024-02-10 PROCEDURE — 99232 SBSQ HOSP IP/OBS MODERATE 35: CPT | Performed by: INTERNAL MEDICINE

## 2024-02-10 PROCEDURE — 2500000001 HC RX 250 WO HCPCS SELF ADMINISTERED DRUGS (ALT 637 FOR MEDICARE OP): Performed by: NURSE PRACTITIONER

## 2024-02-10 PROCEDURE — 86140 C-REACTIVE PROTEIN: CPT | Performed by: INTERNAL MEDICINE

## 2024-02-10 PROCEDURE — 1100000001 HC PRIVATE ROOM DAILY

## 2024-02-10 PROCEDURE — 85025 COMPLETE CBC W/AUTO DIFF WBC: CPT | Performed by: INTERNAL MEDICINE

## 2024-02-10 PROCEDURE — 2500000001 HC RX 250 WO HCPCS SELF ADMINISTERED DRUGS (ALT 637 FOR MEDICARE OP): Performed by: INTERNAL MEDICINE

## 2024-02-10 PROCEDURE — 85652 RBC SED RATE AUTOMATED: CPT | Performed by: INTERNAL MEDICINE

## 2024-02-10 PROCEDURE — 36415 COLL VENOUS BLD VENIPUNCTURE: CPT | Performed by: INTERNAL MEDICINE

## 2024-02-10 PROCEDURE — 2500000004 HC RX 250 GENERAL PHARMACY W/ HCPCS (ALT 636 FOR OP/ED): Performed by: INTERNAL MEDICINE

## 2024-02-10 PROCEDURE — 2500000002 HC RX 250 W HCPCS SELF ADMINISTERED DRUGS (ALT 637 FOR MEDICARE OP, ALT 636 FOR OP/ED): Performed by: INTERNAL MEDICINE

## 2024-02-10 RX ORDER — DIPHENOXYLATE HYDROCHLORIDE AND ATROPINE SULFATE 2.5; .025 MG/1; MG/1
1 TABLET ORAL 4 TIMES DAILY PRN
Status: DISCONTINUED | OUTPATIENT
Start: 2024-02-10 | End: 2024-02-11 | Stop reason: HOSPADM

## 2024-02-10 RX ADMIN — Medication 3 MG: at 21:54

## 2024-02-10 RX ADMIN — Medication 1 TABLET: at 08:20

## 2024-02-10 RX ADMIN — PIPERACILLIN SODIUM AND TAZOBACTAM SODIUM 3.38 G: 3; .375 INJECTION, SOLUTION INTRAVENOUS at 18:15

## 2024-02-10 RX ADMIN — PIPERACILLIN SODIUM AND TAZOBACTAM SODIUM 3.38 G: 3; .375 INJECTION, SOLUTION INTRAVENOUS at 05:13

## 2024-02-10 RX ADMIN — PIPERACILLIN SODIUM AND TAZOBACTAM SODIUM 3.38 G: 3; .375 INJECTION, SOLUTION INTRAVENOUS at 12:24

## 2024-02-10 RX ADMIN — ROSUVASTATIN CALCIUM 5 MG: 10 TABLET, FILM COATED ORAL at 08:20

## 2024-02-10 ASSESSMENT — COGNITIVE AND FUNCTIONAL STATUS - GENERAL
MOBILITY SCORE: 24
DAILY ACTIVITIY SCORE: 24
MOBILITY SCORE: 24
DAILY ACTIVITIY SCORE: 24

## 2024-02-10 ASSESSMENT — ENCOUNTER SYMPTOMS
APPETITE CHANGE: 1
RESPIRATORY NEGATIVE: 1
CARDIOVASCULAR NEGATIVE: 1
DIARRHEA: 1
MYALGIAS: 1
COLOR CHANGE: 1
PSYCHIATRIC NEGATIVE: 1
EYES NEGATIVE: 1

## 2024-02-10 ASSESSMENT — PAIN SCALES - GENERAL
PAINLEVEL_OUTOF10: 1
PAINLEVEL_OUTOF10: 0 - NO PAIN

## 2024-02-10 ASSESSMENT — PAIN - FUNCTIONAL ASSESSMENT: PAIN_FUNCTIONAL_ASSESSMENT: 0-10

## 2024-02-10 ASSESSMENT — PAIN DESCRIPTION - DESCRIPTORS: DESCRIPTORS: PRESSURE

## 2024-02-10 NOTE — PROGRESS NOTES
Claudia Coffey is a 68 y.o. female on day 1 of admission presenting with Cellulitis.    Subjective   Interval History: no fever, no new complaints        Review of Systems    Objective   Range of Vitals (last 24 hours)  Heart Rate:  [70-83]   Temp:  [35.9 °C (96.6 °F)-36.6 °C (97.9 °F)]   Resp:  [16-18]   BP: (107-131)/(62-75)   SpO2:  [92 %-93 %]   Daily Weight  02/07/24 : 88 kg (194 lb)    Body mass index is 31.31 kg/m².    Physical Exam  Constitutional:       Appearance: Normal appearance.   HENT:      Head: Normocephalic and atraumatic.      Mouth/Throat:      Mouth: Mucous membranes are moist.      Pharynx: Oropharynx is clear.   Eyes:      Pupils: Pupils are equal, round, and reactive to light.   Cardiovascular:      Rate and Rhythm: Normal rate and regular rhythm.      Heart sounds: Normal heart sounds.   Pulmonary:      Effort: Pulmonary effort is normal.      Breath sounds: Normal breath sounds.   Abdominal:      General: Abdomen is flat. Bowel sounds are normal.      Palpations: Abdomen is soft.   Musculoskeletal:      Cervical back: Normal range of motion.      Comments: Lt leg edema and redness   Neurological:      Mental Status: She is alert.         Antibiotics  vancomycin (Vancocin) in dextrose 5 % water (D5W) 500 mL IV 1,500 mg  piperacillin-tazobactam-dextrose (Zosyn) IV 3.375 g  diphth,pertus(acell),tetanus (BoostRIX) 2.5-8-5 Lf-mcg-Lf/0.5mL vaccine 0.5 mL  acetaminophen (Tylenol) tablet 975 mg  enoxaparin (Lovenox) syringe 40 mg  acetaminophen (Tylenol) tablet 650 mg  melatonin tablet 3 mg  docusate sodium (Colace) capsule 100 mg  piperacillin-tazobactam-dextrose (Zosyn) IV 3.375 g  sodium chloride 0.9% infusion  albuterol 90 mcg/actuation inhaler 2 puff  montelukast (Singulair) tablet 10 mg  multivitamin with minerals 1 tablet  rosuvastatin (Crestor) tablet 5 mg  alum-mag hydroxide-simeth (Mylanta) 200-200-20 mg/5 mL oral suspension 30 mL  calcium carbonate (Tums) chewable tablet 500  mg  acetaminophen (Tylenol) tablet 650 mg  acetaminophen (Tylenol) oral liquid 650 mg  acetaminophen (Tylenol) suppository 650 mg  docusate sodium (Colace) capsule 100 mg  dextromethorphan-guaifenesin (Robitussin DM)  mg/5 mL oral liquid 5 mL  guaiFENesin (Mucinex) 12 hr tablet 600 mg  enoxaparin (Lovenox) syringe 40 mg  ondansetron (Zofran) tablet 4 mg  ondansetron (Zofran) injection 4 mg  vancomycin in dextrose 5 % (Vancocin) IVPB 750 mg  budesonide-formoteroL (Symbicort) 160-4.5 mcg/actuation inhaler 2 puff  fluticasone furoate-vilanteroL (Breo Ellipta) 200-25 mcg/dose inhaler 1 puff  iohexol (OMNIPaque) 350 mg iodine/mL solution 75 mL      Relevant Results  Labs  Results from last 72 hours   Lab Units 02/10/24  0643 02/09/24  0705 02/08/24  0641 02/07/24  1842   WBC AUTO x10*3/uL 8.1 10.3 15.0* 18.6*   HEMOGLOBIN g/dL 12.4 12.6 12.5 14.0   HEMATOCRIT % 37.6 38.1 37.9 42.1   PLATELETS AUTO x10*3/uL 337 273 243 283   NEUTROS PCT AUTO % 62.7  --   --  87.2   LYMPHS PCT AUTO % 22.6  --   --  6.3   MONOS PCT AUTO % 10.4  --   --  5.6   EOS PCT AUTO % 3.7  --   --  0.3       Results from last 72 hours   Lab Units 02/09/24  0705 02/08/24  0641 02/07/24  1842   SODIUM mmol/L 136 133* 133*   POTASSIUM mmol/L 3.6 3.7 3.6   CHLORIDE mmol/L 103 99 98   CO2 mmol/L 23 24 24   BUN mg/dL 11 13 16   CREATININE mg/dL 0.62 0.80 0.90   GLUCOSE mg/dL 118* 118* 107*   CALCIUM mg/dL 8.4* 8.4* 9.3   ANION GAP mmol/L 14 14 15   EGFR mL/min/1.73m*2 >90 81 71       Results from last 72 hours   Lab Units 02/09/24  0705 02/07/24  1842   ALK PHOS U/L 87 67   BILIRUBIN TOTAL mg/dL 0.8 0.8   PROTEIN TOTAL g/dL 6.9 7.9   ALT U/L 40 45   AST U/L 30 34   ALBUMIN g/dL 3.6 4.3       Estimated Creatinine Clearance: 99.8 mL/min (by C-G formula based on SCr of 0.62 mg/dL).  C-Reactive Protein   Date Value Ref Range Status   02/10/2024 6.50 (H) <1.00 mg/dL Final   02/09/2024 12.90 (H) <1.00 mg/dL Final   02/07/2024 26.49 (H) <1.00 mg/dL Final      Microbiology  Reviewed  Imaging  reviewed        Assessment/Plan   Left calf puncture wound / Lt leg cellulitis, ct without foreign body or abscess  Leukocytosis, resolved     Recommendations :  Continue Sofia      I spent minutes in the professional and overall care of this patient.      Rashi Hinkle MD

## 2024-02-10 NOTE — CARE PLAN
The clinical goals for the shift include Pt will have decreased LLE edema by end of shift    Patient had no pain throughout shift. Tolerated elevating LLE and antibiotics. Anticipate dc tomorrow.

## 2024-02-10 NOTE — PROGRESS NOTES
"Claudia Coffey is a 68 y.o. female on day 1 of admission presenting with Cellulitis.    Subjective   Leg feels better, but having a lot of watery diarrhea. No cramps, nausea etc and appetite has actually improved       Objective     Review of Systems   Constitutional:  Positive for appetite change.   HENT: Negative.     Eyes: Negative.    Respiratory: Negative.     Cardiovascular: Negative.    Gastrointestinal:  Positive for diarrhea.   Musculoskeletal:  Positive for myalgias.   Skin:  Positive for color change and rash.   Psychiatric/Behavioral: Negative.          Physical Exam  Constitutional:       Appearance: Normal appearance.   HENT:      Head: Normocephalic and atraumatic.      Nose: Nose normal.      Mouth/Throat:      Mouth: Mucous membranes are moist.   Eyes:      Extraocular Movements: Extraocular movements intact.      Pupils: Pupils are equal, round, and reactive to light.   Cardiovascular:      Pulses: Normal pulses.      Comments: Regular, S4, 1/6 short systolic murmur  Pulmonary:      Comments: Easy respirations. Brief expiratory wheeze on right  Clear otherwise  Abdominal:      Palpations: Abdomen is soft.      Comments: No tenderness. Bowel sounds are somewhat hyperactive   Musculoskeletal:      Comments: LLE continues to improve. Erythema is more dusky now and continues to localize to smaller area. Edema present, mild tenderness. Warmth is present but not as hot  RLE is without edema etc   Skin:     General: Skin is warm.   Neurological:      General: No focal deficit present.      Mental Status: She is alert and oriented to person, place, and time.         Last Recorded Vitals  Blood pressure 107/62, pulse 70, temperature 36.6 °C (97.9 °F), temperature source Temporal, resp. rate 16, height 1.676 m (5' 6\"), weight 88 kg (194 lb), SpO2 92 %.  Intake/Output last 3 Shifts:  I/O last 3 completed shifts:  In: 1170 (13.3 mL/kg) [P.O.:1020; IV Piggyback:150]  Out: - (0 mL/kg)   Weight: 88 kg "     Relevant Results    Current Facility-Administered Medications:     acetaminophen (Tylenol) tablet 650 mg, 650 mg, oral, q4h PRN, 650 mg at 02/09/24 2315 **OR** acetaminophen (Tylenol) oral liquid 650 mg, 650 mg, oral, q4h PRN **OR** acetaminophen (Tylenol) suppository 650 mg, 650 mg, rectal, q4h PRN, Pretty De La Vega MD    acetaminophen (Tylenol) tablet 650 mg, 650 mg, oral, q6h PRN, NEVA Green    albuterol 90 mcg/actuation inhaler 2 puff, 2 puff, inhalation, q6h PRN, Pretty De La Vega MD    alum-mag hydroxide-simeth (Mylanta) 200-200-20 mg/5 mL oral suspension 30 mL, 30 mL, oral, q6h PRN, Pretty De La Vega MD    calcium carbonate (Tums) chewable tablet 500 mg, 500 mg, oral, 4x daily PRN, Pretty De La Vega MD    dextromethorphan-guaifenesin (Robitussin DM)  mg/5 mL oral liquid 5 mL, 5 mL, oral, q4h PRN, Pretty De La Vega MD    diphenoxylate-atropine (Lomotil) 2.5-0.025 mg per tablet 1 tablet, 1 tablet, oral, 4x daily PRN, Keerthi Schmitt MD    enoxaparin (Lovenox) syringe 40 mg, 40 mg, subcutaneous, q24h, Pretty De La Vega MD, 40 mg at 02/09/24 2314    fluticasone furoate-vilanteroL (Breo Ellipta) 200-25 mcg/dose inhaler 1 puff, 1 puff, inhalation, Daily, Pretty De La Vega MD    guaiFENesin (Mucinex) 12 hr tablet 600 mg, 600 mg, oral, q12h PRN, Pretty De La Vega MD    melatonin tablet 3 mg, 3 mg, oral, Daily, NEVA Green, 3 mg at 02/09/24 2101    montelukast (Singulair) tablet 10 mg, 10 mg, oral, Nightly, Pretty De La Vega MD    multivitamin with minerals 1 tablet, 1 tablet, oral, Daily, Pretty De La Vega MD, 1 tablet at 02/10/24 0820    ondansetron (Zofran) tablet 4 mg, 4 mg, oral, q8h PRN **OR** ondansetron (Zofran) injection 4 mg, 4 mg, intravenous, q8h PRN, Pretty De La Vega MD    piperacillin-tazobactam-dextrose (Zosyn) IV 3.375 g, 3.375 g, intravenous, q6h, Pretty De La Vega MD, Stopped at 02/10/24 0543    rosuvastatin (Crestor) tablet 5 mg, 5 mg, oral, Daily,  Pretty De La Vega MD, 5 mg at 02/10/24 0820     No current facility-administered medications on file prior to encounter.     Current Outpatient Medications on File Prior to Encounter   Medication Sig Dispense Refill    albuterol 90 mcg/actuation inhaler inhale 1 to 2 puffs by mouth and INTO THE LUNGS every 4 to 6 hours if needed      budesonide-formoteroL (Symbicort) 160-4.5 mcg/actuation inhaler Inhale 2 puffs 2 times a day. Rinse mouth with water after use to reduce aftertaste and incidence of candidiasis. Do not swallow. 6 g 11    fluticasone (Flonase) 50 mcg/actuation nasal spray use as directed      montelukast (Singulair) 10 mg tablet Take 1 tablet (10 mg) by mouth once daily at bedtime. (Patient not taking: Reported on 2/7/2024) 90 tablet 3    multivitamin with minerals (multivit-min-iron fum-folic ac) tablet Take 1 tablet by mouth once daily.      rosuvastatin (Crestor) 5 mg tablet Take 1 tablet (5 mg) by mouth once daily. 90 tablet 3    [DISCONTINUED] rosuvastatin (Crestor) 5 mg tablet Take 1 tablet (5 mg) by mouth once daily.                     Assessment/Plan   Principal Problem:    Cellulitis  Active Problems:    Cellulitis of left lower extremity    Cellulitis, LLE, secondary to trauma. Received tetanus booster. No evidence of foreign body on films. Afebrile. CRP elevated but declining, today no leukocytosis. DR Hinkle is recommending one more day of IV ATB, hopeful to transition to oral in am. Check lab in am  Diarrhea. No other symptoms, not feeling ill. Ordering lomotil. Also stopping colace. Check electrolytes in am. Think this is secondary to ATB, but not c diff  Asthma, stable  Hyperlipidemia, stable  Hopefully home tomorrow       I spent 31 minutes in the professional and overall care of this patient.      Keerthi Schmitt MD

## 2024-02-11 VITALS
HEIGHT: 66 IN | BODY MASS INDEX: 31.18 KG/M2 | DIASTOLIC BLOOD PRESSURE: 72 MMHG | OXYGEN SATURATION: 91 % | WEIGHT: 194 LBS | HEART RATE: 76 BPM | TEMPERATURE: 97.7 F | RESPIRATION RATE: 18 BRPM | SYSTOLIC BLOOD PRESSURE: 143 MMHG

## 2024-02-11 LAB
ANION GAP SERPL CALC-SCNC: 13 MMOL/L (ref 10–20)
BUN SERPL-MCNC: 10 MG/DL (ref 6–23)
CALCIUM SERPL-MCNC: 8.7 MG/DL (ref 8.6–10.3)
CHLORIDE SERPL-SCNC: 102 MMOL/L (ref 98–107)
CO2 SERPL-SCNC: 25 MMOL/L (ref 21–32)
CREAT SERPL-MCNC: 0.62 MG/DL (ref 0.5–1.05)
CRP SERPL-MCNC: 2.47 MG/DL
EGFRCR SERPLBLD CKD-EPI 2021: >90 ML/MIN/1.73M*2
ERYTHROCYTE [DISTWIDTH] IN BLOOD BY AUTOMATED COUNT: 13.8 % (ref 11.5–14.5)
GLUCOSE SERPL-MCNC: 113 MG/DL (ref 74–99)
HCT VFR BLD AUTO: 39.2 % (ref 36–46)
HGB BLD-MCNC: 12.8 G/DL (ref 12–16)
MAGNESIUM SERPL-MCNC: 1.95 MG/DL (ref 1.6–2.4)
MCH RBC QN AUTO: 28.5 PG (ref 26–34)
MCHC RBC AUTO-ENTMCNC: 32.7 G/DL (ref 32–36)
MCV RBC AUTO: 87 FL (ref 80–100)
NRBC BLD-RTO: 0 /100 WBCS (ref 0–0)
PLATELET # BLD AUTO: 355 X10*3/UL (ref 150–450)
POTASSIUM SERPL-SCNC: 3.4 MMOL/L (ref 3.5–5.3)
RBC # BLD AUTO: 4.49 X10*6/UL (ref 4–5.2)
SODIUM SERPL-SCNC: 137 MMOL/L (ref 136–145)
WBC # BLD AUTO: 8.3 X10*3/UL (ref 4.4–11.3)

## 2024-02-11 PROCEDURE — 83735 ASSAY OF MAGNESIUM: CPT | Performed by: INTERNAL MEDICINE

## 2024-02-11 PROCEDURE — 2500000002 HC RX 250 W HCPCS SELF ADMINISTERED DRUGS (ALT 637 FOR MEDICARE OP, ALT 636 FOR OP/ED): Performed by: INTERNAL MEDICINE

## 2024-02-11 PROCEDURE — 2500000004 HC RX 250 GENERAL PHARMACY W/ HCPCS (ALT 636 FOR OP/ED): Performed by: INTERNAL MEDICINE

## 2024-02-11 PROCEDURE — 2500000005 HC RX 250 GENERAL PHARMACY W/O HCPCS: Performed by: INTERNAL MEDICINE

## 2024-02-11 PROCEDURE — 36415 COLL VENOUS BLD VENIPUNCTURE: CPT | Performed by: INTERNAL MEDICINE

## 2024-02-11 PROCEDURE — 99238 HOSP IP/OBS DSCHRG MGMT 30/<: CPT | Performed by: INTERNAL MEDICINE

## 2024-02-11 PROCEDURE — 80048 BASIC METABOLIC PNL TOTAL CA: CPT | Performed by: INTERNAL MEDICINE

## 2024-02-11 PROCEDURE — 2500000001 HC RX 250 WO HCPCS SELF ADMINISTERED DRUGS (ALT 637 FOR MEDICARE OP): Performed by: INTERNAL MEDICINE

## 2024-02-11 PROCEDURE — 86140 C-REACTIVE PROTEIN: CPT | Performed by: INTERNAL MEDICINE

## 2024-02-11 PROCEDURE — 85027 COMPLETE CBC AUTOMATED: CPT | Performed by: INTERNAL MEDICINE

## 2024-02-11 PROCEDURE — 94640 AIRWAY INHALATION TREATMENT: CPT

## 2024-02-11 RX ORDER — DIPHENOXYLATE HYDROCHLORIDE AND ATROPINE SULFATE 2.5; .025 MG/1; MG/1
1 TABLET ORAL 4 TIMES DAILY PRN
Start: 2024-02-11 | End: 2024-02-19 | Stop reason: WASHOUT

## 2024-02-11 RX ORDER — AMOXICILLIN AND CLAVULANATE POTASSIUM 875; 125 MG/1; MG/1
1 TABLET, FILM COATED ORAL 2 TIMES DAILY
Qty: 14 TABLET | Refills: 0 | Status: SHIPPED | OUTPATIENT
Start: 2024-02-11 | End: 2024-02-18

## 2024-02-11 RX ORDER — ACETAMINOPHEN 325 MG/1
650 TABLET ORAL EVERY 6 HOURS PRN
Qty: 30 TABLET | Refills: 0 | Status: ON HOLD
Start: 2024-02-11 | End: 2024-03-04 | Stop reason: SDUPTHER

## 2024-02-11 RX ADMIN — ONDANSETRON HYDROCHLORIDE 4 MG: 4 TABLET, FILM COATED ORAL at 11:07

## 2024-02-11 RX ADMIN — Medication 1 TABLET: at 08:38

## 2024-02-11 RX ADMIN — ROSUVASTATIN CALCIUM 5 MG: 10 TABLET, FILM COATED ORAL at 08:38

## 2024-02-11 RX ADMIN — PIPERACILLIN SODIUM AND TAZOBACTAM SODIUM 3.38 G: 3; .375 INJECTION, SOLUTION INTRAVENOUS at 12:00

## 2024-02-11 RX ADMIN — ENOXAPARIN SODIUM 40 MG: 40 INJECTION SUBCUTANEOUS at 00:12

## 2024-02-11 RX ADMIN — PIPERACILLIN SODIUM AND TAZOBACTAM SODIUM 3.38 G: 3; .375 INJECTION, SOLUTION INTRAVENOUS at 06:01

## 2024-02-11 RX ADMIN — FLUTICASONE FUROATE AND VILANTEROL TRIFENATATE 1 PUFF: 200; 25 POWDER RESPIRATORY (INHALATION) at 08:38

## 2024-02-11 RX ADMIN — PIPERACILLIN SODIUM AND TAZOBACTAM SODIUM 3.38 G: 3; .375 INJECTION, SOLUTION INTRAVENOUS at 00:12

## 2024-02-11 ASSESSMENT — COGNITIVE AND FUNCTIONAL STATUS - GENERAL
MOBILITY SCORE: 24
DAILY ACTIVITIY SCORE: 24

## 2024-02-11 ASSESSMENT — PAIN SCALES - GENERAL: PAINLEVEL_OUTOF10: 1

## 2024-02-11 ASSESSMENT — PAIN - FUNCTIONAL ASSESSMENT: PAIN_FUNCTIONAL_ASSESSMENT: 0-10

## 2024-02-11 NOTE — NURSING NOTE
Discharge instructions reviewed with patient and spouse. Prescription and follow ups noted. No additional questions.

## 2024-02-11 NOTE — DISCHARGE SUMMARY
Discharge Diagnosis  Cellulitis    Issues Requiring Follow-Up  None at this time    Discharge Meds     Your medication list        START taking these medications        Instructions Last Dose Given Next Dose Due   acetaminophen 325 mg tablet  Commonly known as: Tylenol      Take 2 tablets (650 mg) by mouth every 6 hours if needed for mild pain (1 - 3).       amoxicillin-pot clavulanate 875-125 mg tablet  Commonly known as: Augmentin      Take 1 tablet by mouth 2 times a day for 7 days.       diphenoxylate-atropine 2.5-0.025 mg tablet  Commonly known as: Lomotil      Take 1 tablet by mouth 4 times a day as needed for diarrhea.       montelukast 10 mg tablet  Commonly known as: Singulair      Take 1 tablet (10 mg) by mouth once daily at bedtime.              CONTINUE taking these medications        Instructions Last Dose Given Next Dose Due   albuterol 90 mcg/actuation inhaler           budesonide-formoteroL 160-4.5 mcg/actuation inhaler  Commonly known as: Symbicort      Inhale 2 puffs 2 times a day. Rinse mouth with water after use to reduce aftertaste and incidence of candidiasis. Do not swallow.       fluticasone 50 mcg/actuation nasal spray  Commonly known as: Flonase           multivitamin with minerals tablet           rosuvastatin 5 mg tablet  Commonly known as: Crestor      Take 1 tablet (5 mg) by mouth once daily.                 Where to Get Your Medications        These medications were sent to [facility] - [address] [address]      Phone: [phone]   amoxicillin-pot clavulanate 875-125 mg tablet       Information about where to get these medications is not yet available    Ask your nurse or doctor about these medications  acetaminophen 325 mg tablet  diphenoxylate-atropine 2.5-0.025 mg tablet         Test Results Pending At Discharge  Pending Labs       Order Current Status    Blood Culture Preliminary result    Blood  Culture Preliminary result            Hospital Course   Ms Coffey is a very pleasant woman, presented to [facility] after she had been working out in the shed, got poked with a tack through her jeans, and had a little hole there. By the next day, she was having fever, myalgias, and not feeling well, and noted that redness had appeared on her leg in the puncture site. By the time she got to ER, her entire LLE was edematous , erythematous and hot to touch. She was admitted and placed on IV antibiotics. Dr Hinkle was consulted as well. Cultures were negative. Initially there was concern about a foreign body, but there was not one present. She has improved over her stay. Initial crp was over 30, has improved to less than 6 now . Exam also is much improved, and her appetite has returned. She is having some diarrhea, from the ATB. She is stable for dc, will continue oral ATB for one more week.     Pertinent Physical Exam At Time of Discharge  Physical Exam  Alert and oriented x 3. Neuro intact. Lungs clear. Heart regular. LLE is still warm to touch. Edema has improved but not resolved yet. Erythema is receding daily, still present on dorsum primarily of leg, and around ankle. Good pulses  Outpatient Follow-Up  Future Appointments   Date Time Provider Department Center   8/1/2024 10:00 AM Solitario Cole DO [address]         Keerthi Schmitt MD

## 2024-02-11 NOTE — NURSING NOTE
0700  Care assumed form [place] night shift.   Bedside report complete.  Patient resting NAD.  Expect DC today

## 2024-02-11 NOTE — CARE PLAN
The patient's goals for the shift include      The clinical goals for the shift include Pt's swelling will decrease during shift    Over the shift, the patient did not make progress toward the following goals. Barriers to progression include pending. Recommendations to address these barriers include pending.

## 2024-02-12 ENCOUNTER — PATIENT OUTREACH (OUTPATIENT)
Dept: CARE COORDINATION | Facility: CLINIC | Age: 69
End: 2024-02-12
Payer: COMMERCIAL

## 2024-02-12 LAB
BACTERIA BLD CULT: NORMAL
BACTERIA BLD CULT: NORMAL

## 2024-02-12 NOTE — PROGRESS NOTES
Discharge Facility: [facility]   Discharge Diagnosis: Cellulitis   Admission Date: 2-7-24   Discharge Date: 2-11-24     PCP Appointment Date: Message routed to office for scheduling     Specialist Appointment Date: n/a   Hospital Encounter and Summary: Linked   See discharge assessment below for further details  Engagement  Call Start Time: 1032 (2/12/2024 10:38 AM)    Medications  Medications reviewed with patient/caregiver?: Yes (2/12/2024 10:38 AM)  Care Management Interventions: No intervention needed (2/12/2024 10:38 AM)  Prescription Comments: START taking:  acetaminophen (Tylenol)   amoxicillin-pot clavulanate (Augmentin)   diphenoxylate-atropine (Lomotil)   montelukast (Singulair) (2/12/2024 10:38 AM)  Is the patient taking all medications as directed (includes completed medication regime)?: Yes (2/12/2024 10:38 AM)  Care Management Interventions: Provided patient education (2/12/2024 10:38 AM)    Appointments  Does the patient have a primary care provider?: Yes (2/12/2024 10:38 AM)  Care Management Interventions: Advised patient to make appointment (2/12/2024 10:38 AM)  Has the patient kept scheduled appointments due by today?: Not applicable (2/12/2024 10:38 AM)    Self Management  Has home health visited the patient within 72 hours of discharge?: Not applicable (2/12/2024 10:38 AM)    Patient Teaching  Does the patient have access to their discharge instructions?: Yes (2/12/2024 10:38 AM)  Care Management Interventions: Reviewed instructions with patient (2/12/2024 10:38 AM)  What is the patient's perception of their health status since discharge?: Improving (2/12/2024 10:38 AM)  Is the patient/caregiver able to teach back the hierarchy of who to call/visit for symptoms/problems? PCP, Specialist, Home Health nurse, Urgent Care, ED, 911: Yes (2/12/2024 10:38 AM)      Deb Baldwin LPN

## 2024-02-13 NOTE — SIGNIFICANT EVENT
Follow Up Phone Call    Outgoing phone call    Spoke to: Claudia Coffey Relationship:self   Phone number: [phone]      Outcome: contacted patient/ family   Chief Complaint   Patient presents with   • Leg Swelling          Diagnosis:Not applicable      States she is feeling better. No further questions or concerns.

## 2024-02-18 NOTE — PROGRESS NOTES
Subjective   Patient ID: Claudia Coffey is a 68 y.o. female who presents for Follow-up (Cellulitis in left leg and calf. It started on Sunday around the 4th got poked with carpet tack and it went through her jeans . Tuesday was very sick and woke up the next day her leg was all red and painful and itchy. ).  Rash  The current episode started 1 to 4 weeks ago. The problem has been gradually improving since onset. The rash is characterized by burning, pain, itchiness, peeling and scaling. It is unknown if there was an exposure to a precipitant. Associated symptoms include fatigue. Pertinent negatives include no anorexia, congestion, cough, diarrhea, eye pain, facial edema, fever, joint pain, nail changes, rhinorrhea, shortness of breath, sore throat or vomiting.     Hospital Course   Ms Coffey is a very pleasant woman, presented to  after she had been working out in the shed, got poked with a tack through her jeans, and had a little hole there. By the next day, she was having fever, myalgias, and not feeling well, and noted that redness had appeared on her leg in the puncture site. By the time she got to ER, her entire LLE was edematous , erythematous and hot to touch. She was admitted and placed on IV antibiotics. Dr Hinkle was consulted as well. Cultures were negative. Initially there was concern about a foreign body, but there was not one present. She has improved over her stay. Initial crp was over 30, has improved to less than 6 now . Exam also is much improved, and her appetite has returned. She is having some diarrhea, from the ATB. She is stable for dc, will continue oral ATB for one more week.      Pertinent Physical Exam At Time of Discharge  Physical Exam  Alert and oriented x 3. Neuro intact. Lungs clear. Heart regular. LLE is still warm to touch. Edema has improved but not resolved yet. Erythema is receding daily, still present on dorsum primarily of leg, and around ankle. Good pulsesReview of Systems    Constitutional:  Positive for fatigue. Negative for fever.   HENT:  Negative for congestion, rhinorrhea and sore throat.    Eyes:  Negative for pain.   Respiratory:  Negative for cough and shortness of breath.    Gastrointestinal:  Negative for anorexia, diarrhea and vomiting.   Musculoskeletal:  Negative for joint pain.   Skin:  Positive for rash. Negative for nail changes.     Progress Notes  Deb Baldwin LPN (Coordinator)  Case Management  Discharge Facility: [facility]   Discharge Diagnosis: Cellulitis   Admission Date: 2-7-24   Discharge Date: 2-11-24      PCP Appointment Date: Message routed to office for scheduling      Specialist Appointment Date: n/a   Hospital Encounter and Summary: Linked   See discharge assessment below for further details  Engagement  Call Start Time: 1032 (2/12/2024 10:38 AM)     Medications  Medications reviewed with patient/caregiver?: Yes (2/12/2024 10:38 AM)  Care Management Interventions: No intervention needed (2/12/2024 10:38 AM)  Prescription Comments: START taking:  acetaminophen (Tylenol)   amoxicillin-pot clavulanate (Augmentin)   diphenoxylate-atropine (Lomotil)   montelukast (Singulair) (2/12/2024 10:38 AM)  Is the patient taking all medications as directed (includes completed medication regime)?: Yes (2/12/2024 10:38 AM)  Care Management Interventions: Provided patient education (2/12/2024 10:38 AM)     Appointments  Does the patient have a primary care provider?: Yes (2/12/2024 10:38 AM)  Care Management Interventions: Advised patient to make appointment (2/12/2024 10:38 AM)  Has the patient kept scheduled appointments due by today?: Not applicable (2/12/2024 10:38 AM)     Self Management  Has home health visited the patient within 72 hours of discharge?: Not applicable (2/12/2024 10:38 AM)     Patient Teaching  Does the patient have access to their discharge instructions?: Yes (2/12/2024 10:38 AM)  Care Management Interventions: Reviewed instructions with patient  (2/12/2024 10:38 AM)  What is the patient's perception of their health status since discharge?: Improving (2/12/2024 10:38 AM)  Is the patient/caregiver able to teach back the hierarchy of who to call/visit for symptoms/problems? PCP, Specialist, Home Health nurse, Urgent Care, ED, 911: Yes (2/12/2024 10:38 AM)           Objective   Physical Exam  21 cm from the ankle to the proximal 3rd of the shin left and at the bottom 7 cm wide.   Hot and painful.      Assessment/Plan   Problem List Items Addressed This Visit       Cellulitis of left lower extremity - Primary    Relevant Medications    levoFLOXacin (Levaquin) 500 mg tablet    Pulmonary emphysema (CMS/HCC)    Relevant Medications    albuterol 90 mcg/actuation inhaler   Sent over antibiotic Levaquin since the patient is still having some issues with some swelling in the left lower extremity anteriorly and we circled the area.  She is instructed to go to the emergency department if the erythema goes outside of this area.  Reviewed hospital records.  Patient will follow-up with us for her regularly scheduled exam

## 2024-02-19 ENCOUNTER — OFFICE VISIT (OUTPATIENT)
Dept: PRIMARY CARE | Facility: CLINIC | Age: 69
End: 2024-02-19
Payer: COMMERCIAL

## 2024-02-19 VITALS
HEART RATE: 92 BPM | DIASTOLIC BLOOD PRESSURE: 78 MMHG | WEIGHT: 194 LBS | TEMPERATURE: 97.4 F | BODY MASS INDEX: 31.18 KG/M2 | HEIGHT: 66 IN | SYSTOLIC BLOOD PRESSURE: 104 MMHG | OXYGEN SATURATION: 95 %

## 2024-02-19 DIAGNOSIS — J43.9 PULMONARY EMPHYSEMA, UNSPECIFIED EMPHYSEMA TYPE (MULTI): ICD-10-CM

## 2024-02-19 DIAGNOSIS — L03.116 CELLULITIS OF LEFT LOWER EXTREMITY: Primary | ICD-10-CM

## 2024-02-19 DIAGNOSIS — R73.03 PREDIABETES: ICD-10-CM

## 2024-02-19 PROBLEM — R09.02 HYPOXIA: Status: ACTIVE | Noted: 2024-02-19

## 2024-02-19 PROBLEM — U07.1 DISEASE DUE TO SEVERE ACUTE RESPIRATORY SYNDROME CORONAVIRUS 2 (SARS-COV-2): Status: ACTIVE | Noted: 2024-02-19

## 2024-02-19 PROBLEM — I73.9 INTERMITTENT CLAUDICATION (CMS-HCC): Status: ACTIVE | Noted: 2024-02-19

## 2024-02-19 PROBLEM — R73.9 HYPERGLYCEMIA: Status: ACTIVE | Noted: 2023-07-26

## 2024-02-19 PROBLEM — R25.2 CRAMPS OF LOWER EXTREMITY: Status: ACTIVE | Noted: 2024-02-19

## 2024-02-19 PROBLEM — E87.1 HYPONATREMIA: Status: ACTIVE | Noted: 2024-02-19

## 2024-02-19 PROBLEM — J96.00 ACUTE RESPIRATORY FAILURE (MULTI): Status: ACTIVE | Noted: 2024-02-19

## 2024-02-19 PROBLEM — R06.09 DYSPNEA ON EXERTION: Status: ACTIVE | Noted: 2024-02-19

## 2024-02-19 PROBLEM — R50.9 FEVER WITH CHILLS: Status: ACTIVE | Noted: 2024-02-19

## 2024-02-19 PROBLEM — J32.9 SINUSITIS: Status: ACTIVE | Noted: 2024-02-19

## 2024-02-19 LAB — POC HEMOGLOBIN A1C: 6.1 % (ref 4.2–6.5)

## 2024-02-19 PROCEDURE — 1036F TOBACCO NON-USER: CPT | Performed by: FAMILY MEDICINE

## 2024-02-19 PROCEDURE — 1160F RVW MEDS BY RX/DR IN RCRD: CPT | Performed by: FAMILY MEDICINE

## 2024-02-19 PROCEDURE — 1159F MED LIST DOCD IN RCRD: CPT | Performed by: FAMILY MEDICINE

## 2024-02-19 PROCEDURE — 1111F DSCHRG MED/CURRENT MED MERGE: CPT | Performed by: FAMILY MEDICINE

## 2024-02-19 PROCEDURE — 83036 HEMOGLOBIN GLYCOSYLATED A1C: CPT | Performed by: FAMILY MEDICINE

## 2024-02-19 PROCEDURE — 99496 TRANSJ CARE MGMT HIGH F2F 7D: CPT | Performed by: FAMILY MEDICINE

## 2024-02-19 PROCEDURE — 1125F AMNT PAIN NOTED PAIN PRSNT: CPT | Performed by: FAMILY MEDICINE

## 2024-02-19 RX ORDER — METFORMIN HYDROCHLORIDE 500 MG/1
TABLET ORAL
COMMUNITY
Start: 2020-10-06 | End: 2024-02-19 | Stop reason: ALTCHOICE

## 2024-02-19 RX ORDER — ALBUTEROL SULFATE 90 UG/1
AEROSOL, METERED RESPIRATORY (INHALATION)
Qty: 18 G | Refills: 3 | Status: SHIPPED | OUTPATIENT
Start: 2024-02-19

## 2024-02-19 RX ORDER — LEVOFLOXACIN 500 MG/1
500 TABLET, FILM COATED ORAL DAILY
Qty: 10 TABLET | Refills: 0 | Status: SHIPPED | OUTPATIENT
Start: 2024-02-19 | End: 2024-03-04 | Stop reason: HOSPADM

## 2024-02-19 ASSESSMENT — ENCOUNTER SYMPTOMS
ANOREXIA: 0
EYE PAIN: 0
FATIGUE: 1
FEVER: 0
DIARRHEA: 0
SORE THROAT: 0
NAIL CHANGES: 0
SHORTNESS OF BREATH: 0
RHINORRHEA: 0
COUGH: 0
VOMITING: 0

## 2024-02-19 ASSESSMENT — PATIENT HEALTH QUESTIONNAIRE - PHQ9
2. FEELING DOWN, DEPRESSED OR HOPELESS: NOT AT ALL
SUM OF ALL RESPONSES TO PHQ9 QUESTIONS 1 AND 2: 0
1. LITTLE INTEREST OR PLEASURE IN DOING THINGS: NOT AT ALL

## 2024-02-26 ENCOUNTER — PATIENT OUTREACH (OUTPATIENT)
Dept: CARE COORDINATION | Facility: CLINIC | Age: 69
End: 2024-02-26
Payer: COMMERCIAL

## 2024-02-26 NOTE — PROGRESS NOTES
Call regarding appt. with PCP on (2-19-24) after hospitalization.  At time of outreach call the patient feels as if their condition has (Stayed the same) since last visit.  Reviewed the PCP appointment with the pt and addressed any questions or concerns.    Pt. States she does still have swelling, and heat to her left lower leg. She states when she stands on the leg, and it is still swelling when she is standing, only in the ankle are.     Pt. States that she was advised by PCP to return to ED if the area doesn't seem to get better with the ATB therapy she is currently on.     Pt. Is agreeable to this treatment plan.     Deb Baldwin LPN

## 2024-02-27 ASSESSMENT — ENCOUNTER SYMPTOMS
COUGH: 0
NAIL CHANGES: 0
EYE PAIN: 0
FATIGUE: 1
FEVER: 0
ANOREXIA: 0
RHINORRHEA: 0
SORE THROAT: 0
VOMITING: 0
DIARRHEA: 0
SHORTNESS OF BREATH: 0

## 2024-02-28 ENCOUNTER — APPOINTMENT (OUTPATIENT)
Dept: CARDIOLOGY | Facility: HOSPITAL | Age: 69
DRG: 603 | End: 2024-02-28
Payer: COMMERCIAL

## 2024-02-28 ENCOUNTER — HOSPITAL ENCOUNTER (INPATIENT)
Facility: HOSPITAL | Age: 69
LOS: 4 days | Discharge: HOME | DRG: 603 | End: 2024-03-04
Attending: EMERGENCY MEDICINE | Admitting: INTERNAL MEDICINE
Payer: COMMERCIAL

## 2024-02-28 ENCOUNTER — APPOINTMENT (OUTPATIENT)
Dept: RADIOLOGY | Facility: HOSPITAL | Age: 69
DRG: 603 | End: 2024-02-28
Payer: COMMERCIAL

## 2024-02-28 DIAGNOSIS — L02.416 CELLULITIS AND ABSCESS OF LEFT LOWER EXTREMITY: ICD-10-CM

## 2024-02-28 DIAGNOSIS — A04.72 C. DIFFICILE DIARRHEA: ICD-10-CM

## 2024-02-28 DIAGNOSIS — L03.116 CELLULITIS OF LEFT LOWER EXTREMITY: ICD-10-CM

## 2024-02-28 DIAGNOSIS — Z78.9 FAILURE OF OUTPATIENT TREATMENT: Primary | ICD-10-CM

## 2024-02-28 DIAGNOSIS — L03.116 CELLULITIS AND ABSCESS OF LEFT LOWER EXTREMITY: ICD-10-CM

## 2024-02-28 LAB
ALBUMIN SERPL BCP-MCNC: 4.4 G/DL (ref 3.4–5)
ALP SERPL-CCNC: 72 U/L (ref 33–136)
ALT SERPL W P-5'-P-CCNC: 48 U/L (ref 7–45)
ANION GAP SERPL CALC-SCNC: 15 MMOL/L (ref 10–20)
AST SERPL W P-5'-P-CCNC: 32 U/L (ref 9–39)
BASOPHILS # BLD AUTO: 0.04 X10*3/UL (ref 0–0.1)
BASOPHILS NFR BLD AUTO: 0.2 %
BILIRUB SERPL-MCNC: 0.4 MG/DL (ref 0–1.2)
BUN SERPL-MCNC: 9 MG/DL (ref 6–23)
CALCIUM SERPL-MCNC: 9.7 MG/DL (ref 8.6–10.3)
CHLORIDE SERPL-SCNC: 101 MMOL/L (ref 98–107)
CO2 SERPL-SCNC: 26 MMOL/L (ref 21–32)
CREAT SERPL-MCNC: 0.71 MG/DL (ref 0.5–1.05)
EGFRCR SERPLBLD CKD-EPI 2021: >90 ML/MIN/1.73M*2
EOSINOPHIL # BLD AUTO: 0.06 X10*3/UL (ref 0–0.7)
EOSINOPHIL NFR BLD AUTO: 0.3 %
ERYTHROCYTE [DISTWIDTH] IN BLOOD BY AUTOMATED COUNT: 13.8 % (ref 11.5–14.5)
GLUCOSE SERPL-MCNC: 127 MG/DL (ref 74–99)
HCT VFR BLD AUTO: 43.1 % (ref 36–46)
HGB BLD-MCNC: 13.8 G/DL (ref 12–16)
IMM GRANULOCYTES # BLD AUTO: 0.1 X10*3/UL (ref 0–0.7)
IMM GRANULOCYTES NFR BLD AUTO: 0.5 % (ref 0–0.9)
INR PPP: 1 (ref 0.9–1.1)
LYMPHOCYTES # BLD AUTO: 1.71 X10*3/UL (ref 1.2–4.8)
LYMPHOCYTES NFR BLD AUTO: 7.8 %
MCH RBC QN AUTO: 28 PG (ref 26–34)
MCHC RBC AUTO-ENTMCNC: 32 G/DL (ref 32–36)
MCV RBC AUTO: 88 FL (ref 80–100)
MONOCYTES # BLD AUTO: 2.24 X10*3/UL (ref 0.1–1)
MONOCYTES NFR BLD AUTO: 10.2 %
NEUTROPHILS # BLD AUTO: 17.86 X10*3/UL (ref 1.2–7.7)
NEUTROPHILS NFR BLD AUTO: 81 %
NRBC BLD-RTO: 0 /100 WBCS (ref 0–0)
PLATELET # BLD AUTO: 380 X10*3/UL (ref 150–450)
POTASSIUM SERPL-SCNC: 3.6 MMOL/L (ref 3.5–5.3)
PROT SERPL-MCNC: 7.7 G/DL (ref 6.4–8.2)
PROTHROMBIN TIME: 11.2 SECONDS (ref 9.8–12.8)
RBC # BLD AUTO: 4.92 X10*6/UL (ref 4–5.2)
SODIUM SERPL-SCNC: 138 MMOL/L (ref 136–145)
WBC # BLD AUTO: 22 X10*3/UL (ref 4.4–11.3)

## 2024-02-28 PROCEDURE — 96361 HYDRATE IV INFUSION ADD-ON: CPT

## 2024-02-28 PROCEDURE — 73590 X-RAY EXAM OF LOWER LEG: CPT | Mod: LEFT SIDE | Performed by: RADIOLOGY

## 2024-02-28 PROCEDURE — 36415 COLL VENOUS BLD VENIPUNCTURE: CPT | Performed by: NURSE PRACTITIONER

## 2024-02-28 PROCEDURE — 96375 TX/PRO/DX INJ NEW DRUG ADDON: CPT

## 2024-02-28 PROCEDURE — 96365 THER/PROPH/DIAG IV INF INIT: CPT | Mod: 59

## 2024-02-28 PROCEDURE — 93005 ELECTROCARDIOGRAM TRACING: CPT

## 2024-02-28 PROCEDURE — 85610 PROTHROMBIN TIME: CPT | Performed by: NURSE PRACTITIONER

## 2024-02-28 PROCEDURE — 2500000004 HC RX 250 GENERAL PHARMACY W/ HCPCS (ALT 636 FOR OP/ED): Performed by: EMERGENCY MEDICINE

## 2024-02-28 PROCEDURE — A4217 STERILE WATER/SALINE, 500 ML: HCPCS | Performed by: EMERGENCY MEDICINE

## 2024-02-28 PROCEDURE — 84075 ASSAY ALKALINE PHOSPHATASE: CPT | Performed by: NURSE PRACTITIONER

## 2024-02-28 PROCEDURE — 85025 COMPLETE CBC W/AUTO DIFF WBC: CPT | Performed by: NURSE PRACTITIONER

## 2024-02-28 PROCEDURE — 87040 BLOOD CULTURE FOR BACTERIA: CPT | Mod: GEALAB | Performed by: NURSE PRACTITIONER

## 2024-02-28 PROCEDURE — 94640 AIRWAY INHALATION TREATMENT: CPT

## 2024-02-28 PROCEDURE — 73590 X-RAY EXAM OF LOWER LEG: CPT | Mod: LT

## 2024-02-28 PROCEDURE — 2500000004 HC RX 250 GENERAL PHARMACY W/ HCPCS (ALT 636 FOR OP/ED): Performed by: NURSE PRACTITIONER

## 2024-02-28 PROCEDURE — 96372 THER/PROPH/DIAG INJ SC/IM: CPT

## 2024-02-28 PROCEDURE — 93971 EXTREMITY STUDY: CPT | Performed by: STUDENT IN AN ORGANIZED HEALTH CARE EDUCATION/TRAINING PROGRAM

## 2024-02-28 PROCEDURE — 93971 EXTREMITY STUDY: CPT

## 2024-02-28 PROCEDURE — 86140 C-REACTIVE PROTEIN: CPT | Performed by: INTERNAL MEDICINE

## 2024-02-28 RX ORDER — VANCOMYCIN HYDROCHLORIDE 1 G/20ML
INJECTION, POWDER, LYOPHILIZED, FOR SOLUTION INTRAVENOUS DAILY PRN
Status: DISCONTINUED | OUTPATIENT
Start: 2024-02-28 | End: 2024-02-28

## 2024-02-28 RX ADMIN — SODIUM CHLORIDE 1000 ML: 9 INJECTION, SOLUTION INTRAVENOUS at 19:12

## 2024-02-28 RX ADMIN — PIPERACILLIN SODIUM AND TAZOBACTAM SODIUM 3.38 G: 3; .375 INJECTION, SOLUTION INTRAVENOUS at 19:13

## 2024-02-28 RX ADMIN — VANCOMYCIN HYDROCHLORIDE 1250 MG: 10 INJECTION, POWDER, LYOPHILIZED, FOR SOLUTION INTRAVENOUS at 20:01

## 2024-02-28 ASSESSMENT — PAIN SCALES - GENERAL: PAINLEVEL_OUTOF10: 6

## 2024-02-28 ASSESSMENT — LIFESTYLE VARIABLES
EVER HAD A DRINK FIRST THING IN THE MORNING TO STEADY YOUR NERVES TO GET RID OF A HANGOVER: NO
EVER FELT BAD OR GUILTY ABOUT YOUR DRINKING: NO
HAVE PEOPLE ANNOYED YOU BY CRITICIZING YOUR DRINKING: NO
HAVE YOU EVER FELT YOU SHOULD CUT DOWN ON YOUR DRINKING: NO

## 2024-02-28 ASSESSMENT — PAIN DESCRIPTION - DESCRIPTORS
DESCRIPTORS: ACHING
DESCRIPTORS: ACHING

## 2024-02-28 ASSESSMENT — PAIN DESCRIPTION - ORIENTATION: ORIENTATION: LEFT

## 2024-02-28 ASSESSMENT — PAIN - FUNCTIONAL ASSESSMENT: PAIN_FUNCTIONAL_ASSESSMENT: 0-10

## 2024-02-28 ASSESSMENT — PAIN DESCRIPTION - PAIN TYPE: TYPE: ACUTE PAIN

## 2024-02-28 ASSESSMENT — PAIN DESCRIPTION - LOCATION: LOCATION: LEG

## 2024-02-28 NOTE — ED TRIAGE NOTES
Patient was diagnosed with cellulitis on her left leg and prescribed abx by her PCP 2 weeks ago. Patient states the cellulitis is not any better and she has had a fever with chills at home.

## 2024-02-28 NOTE — ED PROVIDER NOTES
HPI   Chief Complaint   Patient presents with    Leg Pain       68-year-old female presents today with left leg swelling and erythema.  She was on antibiotics 2 weeks ago with concern for cellulitis.  She now presents today tachycardic left leg pain swelling of the left leg with erythema.  She is not on anticoagulant medication she has never had a DVT.  She is also concerned that she has an outpatient failure for treatment of cellulitis of her left lower extremity.  She denies cough.  She denies abdominal pain.  She denies headache.  She denies any recent trauma or fall.  She is allergic to shellfish.  She denies any allergies to antibiotics.  She has not been eating and drinking normally.      History provided by:  Patient and spouse   used: No                        [place] Coma Scale Score: 15                     Patient History   Past Medical History:   Diagnosis Date    Cervical disc disorder, unspecified, unspecified cervical region 11/15/2022    Cervical disc disease    Pain in right shoulder 07/08/2021    Acute pain of right shoulder    Personal history of other diseases of the digestive system     History of fatty infiltration of liver    Personal history of other diseases of the digestive system     History of acute pancreatitis    Personal history of other diseases of the musculoskeletal system and connective tissue     History of osteopenia    Personal history of other endocrine, nutritional and metabolic disease 12/10/2020    History of hyperglycemia    Personal history of other specified conditions     History of balance disorder    Sciatica, unspecified side     Sciatic pain     Past Surgical History:   Procedure Laterality Date    OTHER SURGICAL HISTORY  07/28/2020    Dilation and curettage    OTHER SURGICAL HISTORY  07/28/2020    Ankle surgery    OTHER SURGICAL HISTORY  07/28/2020    Biopsy    OTHER SURGICAL HISTORY  07/28/2020    Colonoscopy    OTHER SURGICAL HISTORY  07/28/2020     Tubal ligation     No family history on file.  Social History     Tobacco Use    Smoking status: Former     Packs/day: 1.00     Years: 30.00     Additional pack years: 0.00     Total pack years: 30.00     Types: Cigarettes     Start date:      Quit date:      Years since quittin.1    Smokeless tobacco: Never   Substance Use Topics    Alcohol use: Never    Drug use: Never       Physical Exam   ED Triage Vitals [24 1853]   Temperature Heart Rate Respirations BP   37.2 °C (99 °F) (!) 114 20 129/72      Pulse Ox Temp Source Heart Rate Source Patient Position   94 % Temporal Monitor --      BP Location FiO2 (%)     -- --       Physical Exam  Constitutional:       Appearance: Normal appearance.   HENT:      Head: Normocephalic and atraumatic.      Nose: Nose normal.      Mouth/Throat:      Mouth: Mucous membranes are moist.   Eyes:      Pupils: Pupils are equal, round, and reactive to light.   Cardiovascular:      Rate and Rhythm: Regular rhythm. Tachycardia present.      Pulses: Normal pulses.      Heart sounds: Normal heart sounds.   Pulmonary:      Effort: Pulmonary effort is normal.      Breath sounds: Normal breath sounds.   Abdominal:      General: Abdomen is flat.      Palpations: Abdomen is soft.   Musculoskeletal:         General: Swelling and tenderness present. Normal range of motion.      Cervical back: Normal range of motion and neck supple.      Comments: Left lower extremity is swollen and erythematous but pedal pulses 2/2 and cap refills less than 2 seconds.  There is a large Baker's cyst appreciated to the posterior aspect of the left knee.   Skin:     General: Skin is warm.      Capillary Refill: Capillary refill takes less than 2 seconds.      Findings: Erythema present.   Neurological:      General: No focal deficit present.      Mental Status: She is alert and oriented to person, place, and time.   Psychiatric:         Mood and Affect: Mood normal.         Behavior: Behavior  normal.         ED Course & MDM   Diagnoses as of 02/28/24 2117   Failure of outpatient treatment   Cellulitis and abscess of left lower extremity       Medical Decision Making  X-ray was ordered to help rule out osteomyelitis of the left lower extremity.  Ultrasound was ordered with concern for DVT.  Patient received vancomycin and Zosyn and blood cultures were ordered with concern for cellulitis and outpatient failure of oral antibiotics.  Patient was admitted to hospital for outpatient failure.  Full report to hospital service.  Seen and staffed with attending.  Vital signs were monitored and she remained stable.  X-ray was negative for acute osteomyelitis.  Ultrasound was negative for DVT.  She had leukocytosis with a white count of 22.  INR was normal.  Metabolic panel was stable.  Her pedal and posterior tibial pulse was 2/2.  I marked the site of the pedal pulse.  Cap refill was less than 2 seconds.  Seen and staffed with attending.    Amount and/or Complexity of Data Reviewed  Labs: ordered.  Radiology: ordered and independent interpretation performed.  ECG/medicine tests: ordered and independent interpretation performed.     Details: EKG is normal sinus rhythm at 100 bpm.  No ST elevation or depression.  Left axis.  QRS is narrow.  P waves tied to the QRS.  VT interval is normal.  QT corrected is normal.  Interpreted both by attending and myself.        Procedure  Procedures     ERASMO Lofton-ERIC  02/28/24 2053       ERASMO Lofton-ERIC  02/28/24 2117

## 2024-02-29 LAB
C DIF TOX TCDA+TCDB STL QL NAA+PROBE: DETECTED
CRP SERPL-MCNC: 0.67 MG/DL
ERYTHROCYTE [DISTWIDTH] IN BLOOD BY AUTOMATED COUNT: 14.2 % (ref 11.5–14.5)
HCT VFR BLD AUTO: 36.9 % (ref 36–46)
HGB BLD-MCNC: 12 G/DL (ref 12–16)
HOLD SPECIMEN: NORMAL
MCH RBC QN AUTO: 28.2 PG (ref 26–34)
MCHC RBC AUTO-ENTMCNC: 32.5 G/DL (ref 32–36)
MCV RBC AUTO: 87 FL (ref 80–100)
NRBC BLD-RTO: 0 /100 WBCS (ref 0–0)
PLATELET # BLD AUTO: 314 X10*3/UL (ref 150–450)
RBC # BLD AUTO: 4.25 X10*6/UL (ref 4–5.2)
WBC # BLD AUTO: 15.3 X10*3/UL (ref 4.4–11.3)

## 2024-02-29 PROCEDURE — 85027 COMPLETE CBC AUTOMATED: CPT | Performed by: INTERNAL MEDICINE

## 2024-02-29 PROCEDURE — 2500000001 HC RX 250 WO HCPCS SELF ADMINISTERED DRUGS (ALT 637 FOR MEDICARE OP): Performed by: INTERNAL MEDICINE

## 2024-02-29 PROCEDURE — 96367 TX/PROPH/DG ADDL SEQ IV INF: CPT

## 2024-02-29 PROCEDURE — 1100000001 HC PRIVATE ROOM DAILY

## 2024-02-29 PROCEDURE — 2500000002 HC RX 250 W HCPCS SELF ADMINISTERED DRUGS (ALT 637 FOR MEDICARE OP, ALT 636 FOR OP/ED): Performed by: INTERNAL MEDICINE

## 2024-02-29 PROCEDURE — 87493 C DIFF AMPLIFIED PROBE: CPT | Mod: GEALAB | Performed by: INTERNAL MEDICINE

## 2024-02-29 PROCEDURE — 94640 AIRWAY INHALATION TREATMENT: CPT

## 2024-02-29 PROCEDURE — 99285 EMERGENCY DEPT VISIT HI MDM: CPT | Mod: 25

## 2024-02-29 PROCEDURE — 87324 CLOSTRIDIUM AG IA: CPT | Mod: GEALAB | Performed by: INTERNAL MEDICINE

## 2024-02-29 PROCEDURE — 2500000004 HC RX 250 GENERAL PHARMACY W/ HCPCS (ALT 636 FOR OP/ED): Performed by: INTERNAL MEDICINE

## 2024-02-29 PROCEDURE — 99222 1ST HOSP IP/OBS MODERATE 55: CPT | Performed by: INTERNAL MEDICINE

## 2024-02-29 PROCEDURE — 96372 THER/PROPH/DIAG INJ SC/IM: CPT

## 2024-02-29 PROCEDURE — 36415 COLL VENOUS BLD VENIPUNCTURE: CPT | Performed by: INTERNAL MEDICINE

## 2024-02-29 RX ORDER — OXYCODONE HYDROCHLORIDE 5 MG/1
2.5 TABLET ORAL EVERY 6 HOURS PRN
Status: DISCONTINUED | OUTPATIENT
Start: 2024-02-29 | End: 2024-03-04 | Stop reason: HOSPADM

## 2024-02-29 RX ORDER — FLUTICASONE PROPIONATE 50 MCG
1 SPRAY, SUSPENSION (ML) NASAL DAILY
Status: DISCONTINUED | OUTPATIENT
Start: 2024-02-29 | End: 2024-03-04 | Stop reason: HOSPADM

## 2024-02-29 RX ORDER — ROSUVASTATIN CALCIUM 10 MG/1
5 TABLET, COATED ORAL DAILY
Status: DISCONTINUED | OUTPATIENT
Start: 2024-02-29 | End: 2024-03-04 | Stop reason: HOSPADM

## 2024-02-29 RX ORDER — CEFAZOLIN SODIUM 2 G/100ML
2 INJECTION, SOLUTION INTRAVENOUS EVERY 8 HOURS
Status: DISCONTINUED | OUTPATIENT
Start: 2024-02-29 | End: 2024-03-04 | Stop reason: HOSPADM

## 2024-02-29 RX ORDER — ACETAMINOPHEN 325 MG/1
975 TABLET ORAL EVERY 8 HOURS
Status: DISCONTINUED | OUTPATIENT
Start: 2024-02-29 | End: 2024-03-04 | Stop reason: HOSPADM

## 2024-02-29 RX ORDER — ACETAMINOPHEN 500 MG
5 TABLET ORAL NIGHTLY
Status: DISCONTINUED | OUTPATIENT
Start: 2024-02-29 | End: 2024-03-04 | Stop reason: HOSPADM

## 2024-02-29 RX ORDER — MULTIVIT-MIN/IRON FUM/FOLIC AC 7.5 MG-4
1 TABLET ORAL DAILY
Status: DISCONTINUED | OUTPATIENT
Start: 2024-02-29 | End: 2024-03-04 | Stop reason: HOSPADM

## 2024-02-29 RX ORDER — MONTELUKAST SODIUM 10 MG/1
10 TABLET ORAL NIGHTLY
Status: DISCONTINUED | OUTPATIENT
Start: 2024-02-29 | End: 2024-03-04 | Stop reason: HOSPADM

## 2024-02-29 RX ORDER — FLUTICASONE FUROATE AND VILANTEROL 100; 25 UG/1; UG/1
1 POWDER RESPIRATORY (INHALATION)
Status: DISCONTINUED | OUTPATIENT
Start: 2024-02-29 | End: 2024-03-04 | Stop reason: HOSPADM

## 2024-02-29 RX ORDER — POLYETHYLENE GLYCOL 3350 17 G/17G
17 POWDER, FOR SOLUTION ORAL DAILY PRN
Status: DISCONTINUED | OUTPATIENT
Start: 2024-02-29 | End: 2024-03-04 | Stop reason: HOSPADM

## 2024-02-29 RX ORDER — OXYCODONE HYDROCHLORIDE 5 MG/1
5 TABLET ORAL EVERY 6 HOURS PRN
Status: DISCONTINUED | OUTPATIENT
Start: 2024-02-29 | End: 2024-03-04 | Stop reason: HOSPADM

## 2024-02-29 RX ORDER — POLYETHYLENE GLYCOL 3350 17 G/17G
17 POWDER, FOR SOLUTION ORAL NIGHTLY
Status: DISCONTINUED | OUTPATIENT
Start: 2024-02-29 | End: 2024-03-04 | Stop reason: HOSPADM

## 2024-02-29 RX ORDER — ENOXAPARIN SODIUM 100 MG/ML
40 INJECTION SUBCUTANEOUS EVERY 24 HOURS
Status: DISCONTINUED | OUTPATIENT
Start: 2024-02-29 | End: 2024-03-04 | Stop reason: HOSPADM

## 2024-02-29 RX ORDER — GABAPENTIN 100 MG/1
100 CAPSULE ORAL 2 TIMES DAILY
Status: DISCONTINUED | OUTPATIENT
Start: 2024-02-29 | End: 2024-03-04 | Stop reason: HOSPADM

## 2024-02-29 RX ORDER — ALBUTEROL SULFATE 90 UG/1
2 AEROSOL, METERED RESPIRATORY (INHALATION) EVERY 4 HOURS PRN
Status: DISCONTINUED | OUTPATIENT
Start: 2024-02-29 | End: 2024-03-04 | Stop reason: HOSPADM

## 2024-02-29 RX ORDER — ACETAMINOPHEN 325 MG/1
650 TABLET ORAL EVERY 6 HOURS PRN
Status: DISCONTINUED | OUTPATIENT
Start: 2024-02-29 | End: 2024-02-29

## 2024-02-29 RX ADMIN — ACETAMINOPHEN 650 MG: 325 TABLET ORAL at 07:19

## 2024-02-29 RX ADMIN — ROSUVASTATIN CALCIUM 5 MG: 10 TABLET, FILM COATED ORAL at 09:27

## 2024-02-29 RX ADMIN — CEFAZOLIN SODIUM 2 G: 2 INJECTION, SOLUTION INTRAVENOUS at 17:18

## 2024-02-29 RX ADMIN — Medication 1 TABLET: at 09:27

## 2024-02-29 RX ADMIN — CEFAZOLIN SODIUM 2 G: 2 INJECTION, SOLUTION INTRAVENOUS at 09:28

## 2024-02-29 RX ADMIN — FLUTICASONE FUROATE AND VILANTEROL TRIFENATATE 1 PUFF: 100; 25 POWDER RESPIRATORY (INHALATION) at 09:27

## 2024-02-29 RX ADMIN — GABAPENTIN 100 MG: 100 CAPSULE ORAL at 09:29

## 2024-02-29 RX ADMIN — Medication 5 MG: at 20:52

## 2024-02-29 RX ADMIN — GABAPENTIN 100 MG: 100 CAPSULE ORAL at 20:52

## 2024-02-29 RX ADMIN — ACETAMINOPHEN 975 MG: 325 TABLET ORAL at 23:16

## 2024-02-29 RX ADMIN — ACETAMINOPHEN 650 MG: 325 TABLET ORAL at 00:16

## 2024-02-29 RX ADMIN — ACETAMINOPHEN 975 MG: 325 TABLET ORAL at 15:06

## 2024-02-29 RX ADMIN — ENOXAPARIN SODIUM 40 MG: 40 INJECTION SUBCUTANEOUS at 09:27

## 2024-02-29 RX ADMIN — FLUTICASONE PROPIONATE 1 SPRAY: 50 SPRAY, METERED NASAL at 09:27

## 2024-02-29 SDOH — SOCIAL STABILITY: SOCIAL INSECURITY: HAS ANYONE EVER THREATENED TO HURT YOUR FAMILY OR YOUR PETS?: NO

## 2024-02-29 SDOH — SOCIAL STABILITY: SOCIAL INSECURITY: ARE THERE ANY APPARENT SIGNS OF INJURIES/BEHAVIORS THAT COULD BE RELATED TO ABUSE/NEGLECT?: NO

## 2024-02-29 SDOH — SOCIAL STABILITY: SOCIAL INSECURITY: DO YOU FEEL UNSAFE GOING BACK TO THE PLACE WHERE YOU ARE LIVING?: NO

## 2024-02-29 SDOH — SOCIAL STABILITY: SOCIAL INSECURITY: ARE YOU OR HAVE YOU BEEN THREATENED OR ABUSED PHYSICALLY, EMOTIONALLY, OR SEXUALLY BY ANYONE?: NO

## 2024-02-29 SDOH — SOCIAL STABILITY: SOCIAL INSECURITY: ABUSE: ADULT

## 2024-02-29 SDOH — SOCIAL STABILITY: SOCIAL INSECURITY: DOES ANYONE TRY TO KEEP YOU FROM HAVING/CONTACTING OTHER FRIENDS OR DOING THINGS OUTSIDE YOUR HOME?: NO

## 2024-02-29 SDOH — SOCIAL STABILITY: SOCIAL INSECURITY: DO YOU FEEL ANYONE HAS EXPLOITED OR TAKEN ADVANTAGE OF YOU FINANCIALLY OR OF YOUR PERSONAL PROPERTY?: NO

## 2024-02-29 SDOH — SOCIAL STABILITY: SOCIAL INSECURITY: HAVE YOU HAD THOUGHTS OF HARMING ANYONE ELSE?: NO

## 2024-02-29 SDOH — SOCIAL STABILITY: SOCIAL INSECURITY: WERE YOU ABLE TO COMPLETE ALL THE BEHAVIORAL HEALTH SCREENINGS?: YES

## 2024-02-29 ASSESSMENT — COGNITIVE AND FUNCTIONAL STATUS - GENERAL
DAILY ACTIVITIY SCORE: 24
MOBILITY SCORE: 24
DAILY ACTIVITIY SCORE: 24
MOBILITY SCORE: 24
DAILY ACTIVITIY SCORE: 24
MOBILITY SCORE: 24
PATIENT BASELINE BEDBOUND: NO
MOBILITY SCORE: 24

## 2024-02-29 ASSESSMENT — LIFESTYLE VARIABLES
SKIP TO QUESTIONS 9-10: 1
HOW OFTEN DO YOU HAVE A DRINK CONTAINING ALCOHOL: NEVER
AUDIT-C TOTAL SCORE: 0
AUDIT-C TOTAL SCORE: 0
HOW MANY STANDARD DRINKS CONTAINING ALCOHOL DO YOU HAVE ON A TYPICAL DAY: PATIENT DOES NOT DRINK
HOW OFTEN DO YOU HAVE 6 OR MORE DRINKS ON ONE OCCASION: NEVER

## 2024-02-29 ASSESSMENT — ACTIVITIES OF DAILY LIVING (ADL)
PATIENT'S MEMORY ADEQUATE TO SAFELY COMPLETE DAILY ACTIVITIES?: YES
LACK_OF_TRANSPORTATION: NO
BATHING: INDEPENDENT
GROOMING: INDEPENDENT
TOILETING: INDEPENDENT
JUDGMENT_ADEQUATE_SAFELY_COMPLETE_DAILY_ACTIVITIES: YES
LACK_OF_TRANSPORTATION: NO
DRESSING YOURSELF: INDEPENDENT
HEARING - LEFT EAR: FUNCTIONAL
HEARING - RIGHT EAR: FUNCTIONAL
FEEDING YOURSELF: INDEPENDENT
WALKS IN HOME: INDEPENDENT
ADEQUATE_TO_COMPLETE_ADL: YES

## 2024-02-29 ASSESSMENT — PAIN SCALES - GENERAL
PAINLEVEL_OUTOF10: 8
PAINLEVEL_OUTOF10: 4

## 2024-02-29 ASSESSMENT — PATIENT HEALTH QUESTIONNAIRE - PHQ9
1. LITTLE INTEREST OR PLEASURE IN DOING THINGS: NOT AT ALL
SUM OF ALL RESPONSES TO PHQ9 QUESTIONS 1 & 2: 0
2. FEELING DOWN, DEPRESSED OR HOPELESS: NOT AT ALL

## 2024-02-29 ASSESSMENT — PAIN - FUNCTIONAL ASSESSMENT
PAIN_FUNCTIONAL_ASSESSMENT: 0-10
PAIN_FUNCTIONAL_ASSESSMENT: 0-10

## 2024-02-29 ASSESSMENT — ENCOUNTER SYMPTOMS: LEG PAIN: 1

## 2024-02-29 NOTE — PROGRESS NOTES
[facility] Hospitalist Progress Note       [id number]: Please page me for patient care issues.  [phone]: Please page night hospitalist for any issues.     Claudia Coffey  :  1955(68 y.o.)  MRN:  [id number]  PCP: Solitario Coel DO      Principal Problem:    Failure of outpatient treatment      Assessment and Plan:     Claudia Coffey is a 68 y.o. female with PMH cervical disc disorder, fatty liver, osteopenia, pancreatitis, COPD, former smoker. Patient was initially admitted (24-24) LLE cellulitis w/o abscess due to penetrating trauma w/o foreign body confirmed with CT scan.  She had a follow-up with her primary care physician on the .  At which time levofloxacin was started.  She continued to have intermittent fever and chills while on the antibiotics.  She also reports nausea, anorexia and loose stool and abdominal cramping.  She came back to the hospital today because she continued to have fever and chills with persistent LLE erythema, warmth and tenderness.  Patient was admitted for recurrent LLE cellulitis with failed outpatient therapy.     # Recurrent Left lower extremity cellulitis  # Failed outpatient therapy  # COPD w/o exacerbation on bronchodilators  # Former smoker  # Diarrhea r/o infection  -on cefazolin pending cultures  -LE US w/o e/o DVT  -resume rest of home meds as indicated  -ID recs appreciated    DVT Prophylaxis: Subq Lovenox    Code status: Full Code  Diet: Adult diet Regular    Disposition:await test results, await clinical improvement, and anticipate discharge 2-3days    Level of MDM:  Moderate    I personally examined the patient and I personally reviewed chart, data, labs radiology reports    Family Communication  Number :   Name of Designated Family Representative:       Total time spent: 35 minutes, of total time providing counseling or in coordination of care. Total time on this day of visit includes record and documentation review before and after visit including  "documentation and time not explicitly included on EMR time stamp      Subjective:   Interval History:  Leg Pain       The patient complains of LLE cellulitis  The patient feels their symptoms areimproving  no events or new concerns    Scheduled Meds:acetaminophen, 975 mg, oral, q8h  ceFAZolin, 2 g, intravenous, q8h  enoxaparin, 40 mg, subcutaneous, q24h  fluticasone, 1 spray, Each Nostril, Daily  fluticasone furoate-vilanteroL, 1 puff, inhalation, Daily  gabapentin, 100 mg, oral, BID  melatonin, 5 mg, oral, Nightly  montelukast, 10 mg, oral, Nightly  multivitamin with minerals, 1 tablet, oral, Daily  polyethylene glycol, 17 g, oral, Nightly  rosuvastatin, 5 mg, oral, Daily      Continuous Infusions:   PRN Meds:PRN medications: albuterol, oxyCODONE **OR** oxyCODONE, polyethylene glycol    Review of Systems   All other systems reviewed and are negative.    Interval Pertinent History:  Social History     Tobacco Use    Smoking status: Former     Packs/day: 1.00     Years: 30.00     Additional pack years: 0.00     Total pack years: 30.00     Types: Cigarettes     Start date:      Quit date:      Years since quittin.1    Smokeless tobacco: Never   Substance Use Topics    Alcohol use: Never         Objective:   Patient Vitals for the past 24 hrs:   BP Temp Temp src Pulse Resp SpO2 Height Weight   24 0600 124/56 -- -- 78 -- 95 % -- --   24 0500 120/58 -- -- -- -- -- -- --   24 0400 111/52 -- -- 89 -- -- -- --   24 0300 125/89 -- -- -- -- -- -- --   24 0200 (!) 102/48 -- -- -- -- -- -- --   24 0100 117/52 -- -- -- -- -- -- --   24 0000 (!) 97/28 -- -- -- -- -- 1.676 m (5' 5.98\") --   24 2338 -- (!) 38.2 °C (100.8 °F) Temporal (!) 114 -- -- -- --   240 131/63 -- -- -- -- 95 % -- --   24 145/71 -- -- (!) 106 -- -- -- --   24 138/69 -- -- 99 -- -- -- --   24 134/67 -- -- (!) 101 -- 96 % -- --   24 129/72 37.2 °C " "(99 °F) Temporal (!) 114 20 94 % 1.676 m (5' 6\") 86.2 kg (190 lb)       Average, Min, and Max for last 24 hours Vitals:  TEMPERATURE:  Temp  Av.7 °C (99.9 °F)  Min: 37.2 °C (99 °F)  Max: 38.2 °C (100.8 °F)    RESPIRATIONS RANGE: Resp  Av  Min: 20  Max: 20    PULSE RANGE: Pulse  Av.1  Min: 78  Max: 114    BLOOD PRESSURE RANGE:  Systolic (24hrs), Av , Min:97 , Max:145   ; Diastolic (24hrs), Av, Min:28, Max:89      PULSE OXIMETRY RANGE: SpO2  Av %  Min: 94 %  Max: 96 %    I/O last 3 completed shifts:  In: 1300 (15.1 mL/kg) [IV Piggyback:1300]  Out: 1175 (13.6 mL/kg) [Urine:1175 (0.4 mL/kg/hr)]  Weight: 86.2 kg     Physical Exam  Vitals and nursing note reviewed.   Constitutional:       Appearance: Normal appearance.   HENT:      Head: Normocephalic and atraumatic.      Right Ear: External ear normal.      Left Ear: External ear normal.      Nose: Nose normal.      Mouth/Throat:      Mouth: Mucous membranes are moist.   Eyes:      General: No scleral icterus.        Right eye: No discharge.         Left eye: No discharge.      Extraocular Movements: Extraocular movements intact.      Conjunctiva/sclera: Conjunctivae normal.      Pupils: Pupils are equal, round, and reactive to light.   Cardiovascular:      Rate and Rhythm: Normal rate and regular rhythm.   Pulmonary:      Effort: Pulmonary effort is normal.      Breath sounds: Normal breath sounds.   Abdominal:      General: Abdomen is flat. Bowel sounds are normal.      Palpations: Abdomen is soft.   Musculoskeletal:         General: Normal range of motion.      Right lower leg: No swelling or tenderness. No edema.      Left lower leg: Tenderness (erythema, warm to touch) present. No swelling. No edema.   Skin:     General: Skin is warm and dry.      Capillary Refill: Capillary refill takes less than 2 seconds.      Findings: Erythema present. No abrasion or abscess.   Neurological:      General: No focal deficit present.   Psychiatric:    " "     Mood and Affect: Mood normal.         Thought Content: Thought content normal.         Judgment: Judgment normal.               Lab Results   Component Value Date     02/28/2024    K 3.6 02/28/2024     02/28/2024    CO2 26 02/28/2024    BUN 9 02/28/2024    CREATININE 0.71 02/28/2024    GLUCOSE 127 (H) 02/28/2024    CALCIUM 9.7 02/28/2024    PROT 7.7 02/28/2024    BILITOT 0.4 02/28/2024    ALKPHOS 72 02/28/2024    AST 32 02/28/2024    ALT 48 (H) 02/28/2024       Lab Results   Component Value Date    WBC 22.0 (H) 02/28/2024    HGB 13.8 02/28/2024    HCT 43.1 02/28/2024    MCV 88 02/28/2024     02/28/2024    LYMPHOPCT 7.8 02/28/2024    RBC 4.92 02/28/2024    MCH 28.0 02/28/2024    MCHC 32.0 02/28/2024    RDW 13.8 02/28/2024    CRP 2.47 (H) 02/11/2024       No components found for: \"LABA1C\"  No components found for: \"LFT\"    IMAGES:  Encounter Date: 02/07/24   ECG 12 lead   Result Value    Ventricular Rate 98    Atrial Rate 98    MT Interval 138    QRS Duration 92    QT Interval 354    QTC Calculation(Bazett) 451    P Axis 46    R Axis -5    T Axis 36    QRS Count 16    Q Onset 220    P Onset 151    P Offset 200    T Offset 397    QTC Fredericia 417    Narrative    Normal sinus rhythm  Incomplete right bundle branch block  Inferior infarct (cited on or before 21-NOV-2015)  Cannot rule out Anterior infarct (cited on or before 21-MAR-2008)  Abnormal ECG  When compared with ECG of 21-NOV-2015 19:10,  No significant change was found  See ED provider note for full interpretation and clinical correlation  Confirmed by Tino Robbins ([id number]) on 2/9/2024 6:30:41 PM        No echocardiogram results found for the past 12 months  === 02/28/24 ===    XR TIBIA FIBULA 2 VIEWS LEFT    - Impression -  Soft tissue swelling, with no specific findings of osteomyelitis.      MACRO:  None    Signed by: Layne Maravilla 2/28/2024 7:27 PM  Dictation workstation:   [id number]  === 02/07/24 ===    CT TIBIA FIBULA LEFT W IV " CONTRAST    - Impression -  Cellulitis without CT evidence of abscess, deep compartment infection  or acute osteomyelitis.      MACRO:  None    Signed by: Leeanna French 2/8/2024 12:35 PM  Dictation workstation:   [id number]  === 02/28/24 ===    XR TIBIA FIBULA 2 VIEWS LEFT    - Impression -  Soft tissue swelling, with no specific findings of osteomyelitis.      MACRO:  None    Signed by: Layne Maravilla 2/28/2024 7:27 PM  Dictation workstation:   [id number]  === 02/17/16 ===    MR 3D POST PROCESSING W REPORT 2ND WORKSTATION    Additional results of the last 24 hours have been reviewed.    Dictated using [employer] Version 2.4  Proof read however unrecognized voice recognition errors may have occurred     Electronically signed by Adolfo Martinez DO on 02/29/24 at 8:23 AM

## 2024-02-29 NOTE — PROGRESS NOTES
02/29/24 1526   Discharge Planning   Living Arrangements Spouse/significant other   Support Systems Spouse/significant other   Assistance Needed A&Ox3, independent with ADLs, no DME, room air at baseline, drives   Type of Residence Private residence   Number of Stairs to Enter Residence 0   Number of Stairs Within Residence 0   Do you have animals or pets at home? Yes   Type of Animals or Pets 1 cat, Bigg   Home or Post Acute Services None   Patient expects to be discharged to: Home no needs   Does the patient need discharge transport arranged? No   Financial Resource Strain   How hard is it for you to pay for the very basics like food, housing, medical care, and heating? Not hard   Housing Stability   In the last 12 months, was there a time when you were not able to pay the mortgage or rent on time? N   In the last 12 months, was there a time when you did not have a steady place to sleep or slept in a shelter (including now)? N   Transportation Needs   In the past 12 months, has lack of transportation kept you from medical appointments or from getting medications? no   In the past 12 months, has lack of transportation kept you from meetings, work, or from getting things needed for daily living? No     02/29/2024 1527: Spoke to the patient at the bedside. Patient denies any discharge needs.

## 2024-02-29 NOTE — NURSING NOTE
0700 ED Holds patient.  Resting in bed NAD Bedside report complete.   Breakfast tray ordered.  Patient aware of need for stool sample.

## 2024-02-29 NOTE — CONSULTS
Consults  Referred by  Veronique Gil    Primary MD: Solitario Cole, DO    Reason For Consult  cellulitis    History Of Present Illness  Claudia Coffey is a 68 y.o. female, hx of a puncture wound to the Lt calf in early Feb, complicated by cellulitis, she was in the hospital for few days, treated with antibiotics, improved, she was discharged on Augmentin, the ct was was negative, she has been treated with Levaquin by the primary care for cellulitis in the Lt leg since the , she was admitted for increasing Lt leg pain and redness, no drainage, she feels nauseated, has diarrhea, no emesis or abdominal pain, had subjective fever, the WBC are up, the US is negative for DVT, the xray of the tibia is negative     Past Medical History  She has a past medical history of Cervical disc disorder, unspecified, unspecified cervical region (11/15/2022), Pain in right shoulder (2021), Personal history of other diseases of the digestive system, Personal history of other diseases of the digestive system, Personal history of other diseases of the musculoskeletal system and connective tissue, Personal history of other endocrine, nutritional and metabolic disease (12/10/2020), Personal history of other specified conditions, and Sciatica, unspecified side.    Surgical History  She has a past surgical history that includes Other surgical history (2020); Other surgical history (2020); Other surgical history (2020); Other surgical history (2020); and Other surgical history (2020).     Social History     Occupational History    Not on file   Tobacco Use    Smoking status: Former     Packs/day: 1.00     Years: 30.00     Additional pack years: 0.00     Total pack years: 30.00     Types: Cigarettes     Start date:      Quit date:      Years since quittin.1    Smokeless tobacco: Never   Substance and Sexual Activity    Alcohol use: Never    Drug use: Never    Sexual activity: Not on file  "    Travel History   Travel since 01/29/24    No documented travel since 01/29/24          Family History  No family history on file., no immunodeficiency  Allergies  Shellfish containing products     Immunization History   Administered Date(s) Administered    Tdap vaccine, age 7 year and older (BOOSTRIX, ADACEL) 02/09/2024     Pneumonia and influenza vaccines are planned  Ward fall risk 35, preventive protocol was implemented  Depression screen is negative    Medications  Home medications:  (Not in a hospital admission)    Current medications:  Scheduled medications  acetaminophen, 975 mg, oral, q8h  ceFAZolin, 2 g, intravenous, q8h  enoxaparin, 40 mg, subcutaneous, q24h  fluticasone, 1 spray, Each Nostril, Daily  fluticasone furoate-vilanteroL, 1 puff, inhalation, Daily  gabapentin, 100 mg, oral, BID  melatonin, 5 mg, oral, Nightly  montelukast, 10 mg, oral, Nightly  multivitamin with minerals, 1 tablet, oral, Daily  polyethylene glycol, 17 g, oral, Nightly  rosuvastatin, 5 mg, oral, Daily      Continuous medications       PRN medications  PRN medications: albuterol, oxyCODONE **OR** oxyCODONE, polyethylene glycol    Review of Systems   All other systems reviewed and are negative.       Objective  Range of Vitals (last 24 hours)  Heart Rate:  []   Temperature:  [37.2 °C (99 °F)-38.2 °C (100.8 °F)]   Respirations:  [20]   BP: ()/(28-89)   Height:  [167.6 cm (5' 5.98\")-167.6 cm (5' 6\")]   Weight:  [86.2 kg (190 lb)]   Pulse Ox:  [94 %-96 %]   Daily Weight  02/28/24 : 86.2 kg (190 lb)    Body mass index is 30.68 kg/m².   Nutritional consult    Physical Exam  Constitutional:       Appearance: Normal appearance.   HENT:      Head: Normocephalic and atraumatic.      Mouth/Throat:      Mouth: Mucous membranes are moist.      Pharynx: Oropharynx is clear.   Eyes:      Pupils: Pupils are equal, round, and reactive to light.   Cardiovascular:      Rate and Rhythm: Normal rate and regular rhythm.      Heart " "sounds: Normal heart sounds.   Pulmonary:      Effort: Pulmonary effort is normal.      Breath sounds: Normal breath sounds.   Abdominal:      General: Abdomen is flat. Bowel sounds are normal.      Palpations: Abdomen is soft.   Musculoskeletal:      Cervical back: Normal range of motion.      Comments: Lt leg edema and redness below the knee, no open wounds, no fluctuance   Neurological:      Mental Status: She is alert.          Relevant Results  Outside Hospital Results  reviewed  Labs  Results from last 72 hours   Lab Units 02/29/24  0836 02/28/24 1912   WBC AUTO x10*3/uL 15.3* 22.0*   HEMOGLOBIN g/dL 12.0 13.8   HEMATOCRIT % 36.9 43.1   PLATELETS AUTO x10*3/uL 314 380   NEUTROS PCT AUTO %  --  81.0   LYMPHS PCT AUTO %  --  7.8   MONOS PCT AUTO %  --  10.2   EOS PCT AUTO %  --  0.3       Results from last 72 hours   Lab Units 02/28/24 1912   SODIUM mmol/L 138   POTASSIUM mmol/L 3.6   CHLORIDE mmol/L 101   CO2 mmol/L 26   BUN mg/dL 9   CREATININE mg/dL 0.71   GLUCOSE mg/dL 127*   CALCIUM mg/dL 9.7   ANION GAP mmol/L 15   EGFR mL/min/1.73m*2 >90       Results from last 72 hours   Lab Units 02/28/24 1912   ALK PHOS U/L 72   BILIRUBIN TOTAL mg/dL 0.4   PROTEIN TOTAL g/dL 7.7   ALT U/L 48*   AST U/L 32   ALBUMIN g/dL 4.4       Estimated Creatinine Clearance: 86.3 mL/min (by C-G formula based on SCr of 0.71 mg/dL).  C-Reactive Protein   Date Value Ref Range Status   02/28/2024 0.67 <1.00 mg/dL Final   02/11/2024 2.47 (H) <1.00 mg/dL Final   02/10/2024 6.50 (H) <1.00 mg/dL Final     Sedimentation Rate   Date Value Ref Range Status   02/10/2024 79 (H) 0 - 30 mm/h Final   02/07/2024 36 (H) 0 - 30 mm/h Final     No results found for: \"HIV1X2\", \"HIVCONF\", \"ADWKEM6FF\"  No results found for: \"HEPCABINIT\", \"HEPCAB\", \"HCVPCRQUANT\"  Microbiology  Reviewed  Imaging  Reviewed       Assessment/Plan   Left leg stasis /cellulitis  Leukocytosis  Diarrhea    Recommendations :  Cefazolin pending the cultures  Cultures  Keep the leg " elevated  Stool studies      I spent minutes in the professional and overall care of this patient.      Rashi Hinkle MD

## 2024-02-29 NOTE — PROGRESS NOTES
Pharmacy Medication History Review    Claudia Coffey is a 68 y.o. female admitted for Failure of outpatient treatment. Pharmacy reviewed the patient's wibmv-ab-elxhtttla medications and allergies for accuracy.    The list below reflectives the updated PTA list. Please review each medication in order reconciliation for additional clarification and justification.  Prior to Admission medications    Medication Sig Start Date End Date Taking? Authorizing Provider   acetaminophen (Tylenol) 325 mg tablet Take 2 tablets (650 mg) by mouth every 6 hours if needed for mild pain (1 - 3). 2/11/24   Keerthi Schmitt MD   albuterol 90 mcg/actuation inhaler inhale 1 to 2 puffs by mouth and INTO THE LUNGS every 4 to 6 hours if needed 2/19/24   Solitario Cole, DO   budesonide-formoteroL (Symbicort) 160-4.5 mcg/actuation inhaler Inhale 2 puffs 2 times a day. Rinse mouth with water after use to reduce aftertaste and incidence of candidiasis. Do not swallow. 2/1/24 1/31/25  Solitario Cole DO   fluticasone (Flonase) 50 mcg/actuation nasal spray Administer 1 spray into each nostril once daily. 1/31/23   Historical Provider, MD   levoFLOXacin (Levaquin) 500 mg tablet Take 1 tablet (500 mg) by mouth once daily for 10 days. 2/19/24 2/29/24  Solitario Cole DO   montelukast (Singulair) 10 mg tablet Take 1 tablet (10 mg) by mouth once daily at bedtime.  Patient not taking: Reported on 2/29/2024 8/1/23 7/31/24  Solitario Cole DO   multivitamin with minerals (multivit-min-iron fum-folic ac) tablet Take 1 tablet by mouth once daily. 11/24/15   Historical Provider, MD   rosuvastatin (Crestor) 5 mg tablet Take 1 tablet (5 mg) by mouth once daily.  Patient taking differently: Take 1 tablet (5 mg) by mouth once daily at bedtime. 2/5/24   Solitario Cole DO        The list below reflectives the updated allergy list. Please review each documented allergy for additional clarification and justification.  Allergies  Reviewed by Tc Solorio RN on 2/28/2024         Severity Reactions Comments    Shellfish Containing Products Not Specified Unknown, GI Upset, Hives             Below are additional concerns with the patient's PTA list.      Amanda Johns CPhT

## 2024-02-29 NOTE — H&P
History Of Present Illness  Claudia Coffey is a 68 y.o. female with a past medical history of cervical disc disorder, fatty liver infiltration, history of osteopenia, history of pancreatitis and many other comorbidities who was admitted to the hospital for left lower extremity cellulitis.  Patient states that she was initially admitted to the hospital on the first week of February 2024 for left lower extremity cellulitis.  She was discharged on the 11th with oral antibiotics.  She had a follow-up with her primary care physician on the 19th.  At which time levofloxacin was started.  She continued to have intermittent fever and chills while on the antibiotics.  She also reports nausea, decreased appetite and loose stool abdominal cramping.  She came back to the hospital today because she continued to have fever and chills.  The left lower extremity is still erythematous and warm even though she is on the antibiotic.  She otherwise denies headache, dizziness, chest pain, palpitation, difficulty breathing, nausea, vomiting, melena, hematochezia, or hematuria.     Past Medical History  She has a past medical history of Cervical disc disorder, unspecified, unspecified cervical region (11/15/2022), Pain in right shoulder (07/08/2021), Personal history of other diseases of the digestive system, Personal history of other diseases of the digestive system, Personal history of other diseases of the musculoskeletal system and connective tissue, Personal history of other endocrine, nutritional and metabolic disease (12/10/2020), Personal history of other specified conditions, and Sciatica, unspecified side.    Surgical History  She has a past surgical history that includes Other surgical history (07/28/2020); Other surgical history (07/28/2020); Other surgical history (07/28/2020); Other surgical history (07/28/2020); and Other surgical history (07/28/2020).     Social History  She reports that she quit smoking about 23 years  ago. Her smoking use included cigarettes. She started smoking about 53 years ago. She has a 30.00 pack-year smoking history. She has never used smokeless tobacco. She reports that she does not drink alcohol and does not use drugs.    Family History  No family history on file.     Allergies  Shellfish containing products    Medications  Scheduled medications     Continuous medications     PRN medications  PRN medications: acetaminophen    Review of systems: 10-point review of systems is negative.     Physical Exam  Constitutional: alert and oriented x 3, awake, cooperative, no acute distress  Skin: warm and dry  Head/Neck: Normocephalic, atraumatic  Eyes: clear sclera  ENMT: mucous membranes moist  Cardio: Regular rate and rhythm  Resp: CTA bilaterally, good respiratory effort  Gastrointestinal: Soft, nontender, nondistended, bowel sounds present  Musculoskeletal: ROM intact, no joint swelling  Extremities: Swelling of the left lower extremity with erythema.  There is also moderate tenderness palpation which is diffuse.  Neuro: lert and oriented x 3, sensation is intact.  Patient moves all limbs against resistance.  Psychological: Appropriate mood and behavior     Last Recorded Vitals  BP (!) 97/28   Pulse (!) 114   Temp (!) 38.2 °C (100.8 °F) (Temporal)   Resp 20   Wt 86.2 kg (190 lb)   SpO2 95%     Relevant Results  Results for orders placed or performed during the hospital encounter of 02/28/24 (from the past 24 hour(s))   CBC and Auto Differential   Result Value Ref Range    WBC 22.0 (H) 4.4 - 11.3 x10*3/uL    nRBC 0.0 0.0 - 0.0 /100 WBCs    RBC 4.92 4.00 - 5.20 x10*6/uL    Hemoglobin 13.8 12.0 - 16.0 g/dL    Hematocrit 43.1 36.0 - 46.0 %    MCV 88 80 - 100 fL    MCH 28.0 26.0 - 34.0 pg    MCHC 32.0 32.0 - 36.0 g/dL    RDW 13.8 11.5 - 14.5 %    Platelets 380 150 - 450 x10*3/uL    Neutrophils % 81.0 40.0 - 80.0 %    Immature Granulocytes %, Automated 0.5 0.0 - 0.9 %    Lymphocytes % 7.8 13.0 - 44.0 %     Monocytes % 10.2 2.0 - 10.0 %    Eosinophils % 0.3 0.0 - 6.0 %    Basophils % 0.2 0.0 - 2.0 %    Neutrophils Absolute 17.86 (H) 1.20 - 7.70 x10*3/uL    Immature Granulocytes Absolute, Automated 0.10 0.00 - 0.70 x10*3/uL    Lymphocytes Absolute 1.71 1.20 - 4.80 x10*3/uL    Monocytes Absolute 2.24 (H) 0.10 - 1.00 x10*3/uL    Eosinophils Absolute 0.06 0.00 - 0.70 x10*3/uL    Basophils Absolute 0.04 0.00 - 0.10 x10*3/uL   Comprehensive metabolic panel   Result Value Ref Range    Glucose 127 (H) 74 - 99 mg/dL    Sodium 138 136 - 145 mmol/L    Potassium 3.6 3.5 - 5.3 mmol/L    Chloride 101 98 - 107 mmol/L    Bicarbonate 26 21 - 32 mmol/L    Anion Gap 15 10 - 20 mmol/L    Urea Nitrogen 9 6 - 23 mg/dL    Creatinine 0.71 0.50 - 1.05 mg/dL    eGFR >90 >60 mL/min/1.73m*2    Calcium 9.7 8.6 - 10.3 mg/dL    Albumin 4.4 3.4 - 5.0 g/dL    Alkaline Phosphatase 72 33 - 136 U/L    Total Protein 7.7 6.4 - 8.2 g/dL    AST 32 9 - 39 U/L    Bilirubin, Total 0.4 0.0 - 1.2 mg/dL    ALT 48 (H) 7 - 45 U/L   Protime-INR   Result Value Ref Range    Protime 11.2 9.8 - 12.8 seconds    INR 1.0 0.9 - 1.1   Blood Culture    Specimen: Peripheral Venipuncture; Blood culture   Result Value Ref Range    Blood Culture Loaded on Instrument - Culture in progress    Blood Culture    Specimen: Peripheral Venipuncture; Blood culture   Result Value Ref Range    Blood Culture Loaded on Instrument - Culture in progress      Lower extremity venous duplex left    Result Date: 2/28/2024  Interpreted By:  Nancy Ross, STUDY: [facility] LOWER EXTREMITY VENOUS DUPLEX LEFT;  2/28/2024 8:03 pm   INDICATION: Signs/Symptoms:left leg swollen and painful. patient tachy need to rule out DVT.   COMPARISON: None.   ACCESSION NUMBER(S): [id number]   ORDERING CLINICIAN: BELEN DIXON   TECHNIQUE: Grayscale, color and spectral Doppler sonographic images of the left lower extremity deep venous system. The right common femoral vein was imaged for comparison.   FINDINGS:  THIGH VEINS: There is normal compressibility of the left common femoral vein, saphenous-femoral junction, femoral vein and popliteal vein. There is normal spontaneous and phasic variation by spectral doppler.   CALF VEINS: The posterior tibial and peroneal veins demonstrate normal color flow and compressibility.   CONTRALATERAL COMPARISON: The right common femoral vein is patent.   OTHER FINDINGS: None.       No evidence of acute DVT in the left lower extremity.   MACRO: None.   Signed by: Nancy Ross 2/28/2024 8:18 PM Dictation workstation:   [id number]    XR tibia fibula left 2 views    Result Date: 2/28/2024  Interpreted By:  Layne Maravilla, STUDY: XR TIBIA FIBULA LEFT 2 VIEWS; ;  2/28/2024 7:13 pm   INDICATION: Signs/Symptoms:Rule out osteomyelitis.   COMPARISON: 02/07/2024.   ACCESSION NUMBER(S): [id number]   ORDERING CLINICIAN: BELEN DIXON   FINDINGS: AP and lateral views of the left tibia and fibula demonstrate normal alignment. No acute fracture. No periosteal reaction or bony erosive change. Mild soft tissue swelling of the mid to distal lower extremity with no soft tissue gas or radiopaque foreign body.       Soft tissue swelling, with no specific findings of osteomyelitis.     MACRO: None   Signed by: Layne Maravilla 2/28/2024 7:27 PM Dictation workstation:   [id number]    ECG 12 lead    Result Date: 2/9/2024  Normal sinus rhythm Incomplete right bundle branch block Inferior infarct (cited on or before 21-NOV-2015) Cannot rule out Anterior infarct (cited on or before 21-MAR-2008) Abnormal ECG When compared with ECG of 21-NOV-2015 19:10, No significant change was found See ED provider note for full interpretation and clinical correlation Confirmed by Tino Robbins ([id number]) on 2/9/2024 6:30:41 PM    CT tibia fibula left w IV contrast    Result Date: 2/8/2024  Interpreted By:  Leeanna French, STUDY: CT TIBIA FIBULA LEFT W IV CONTRAST;  2/8/2024 11:00 am   INDICATION: Signs/Symptoms:Lt calf puncture  wound / cellulitis.   COMPARISON: None.   ACCESSION NUMBER(S): [id number]   ORDERING CLINICIAN: SAYRA SIDDIQUI   TECHNIQUE: CT imaging of the  left lower leg was obtained  with 75 mL of Omnipaque 350 intravenous contrast. Coronal and sagittal reformatted images were performed.   FINDINGS: OSSEOUS STRUCTURES: No acute fracture or osseous destruction. Mild osteoarthritis of the knee with medial compartment joint space narrowing and marginal osteophytes. Trace knee joint effusion. Partially imaged angle is grossly maintained without joint effusion. Tiny ossicle inferior to the medial malleolus.   SOFT TISSUES: There is mild skin thickening and subcutaneous edema circumferentially around the lower leg, most prominently along the anterolateral pretibial region. No fluid collection. No suspicious soft tissue mass. Muscle bulk is maintained with preserved intermuscular fascial planes.       Cellulitis without CT evidence of abscess, deep compartment infection or acute osteomyelitis.     MACRO: None   Signed by: Leeanna French 2/8/2024 12:35 PM Dictation workstation:   [email or web address]    XR chest 1 view    Result Date: 2/7/2024  STUDY: Chest Radiograph;  02/07/2024 6:22 PM INDICATION: Cough, fever.  COMPARISON: XR chest 12/03/2021.  tachycardia chest lung cancer screen 12/01/2022.  ACCESSION NUMBER(S): [id number] ORDERING CLINICIAN: BELEN DIXON TECHNIQUE:  Frontal chest was obtained at 1822 hours. FINDINGS: CARDIOMEDIASTINAL SILHOUETTE: Cardiomediastinal silhouette is normal in size and configuration.  LUNGS: Lungs are clear.  ABDOMEN: No remarkable upper abdominal findings.  BONES: No acute osseous changes.    No radiographic evidence of acute cardiopulmonary disease. Signed by Jorge Kim MD    XR tibia fibula left 2 views    Result Date: 2/7/2024  STUDY: Tibia and Fibula Radiographs; 2/7/2024, 6:22PM INDICATION: Possible foreign body with infection in left lower extremity. Evaluate for osteomyelitis. COMPARISON: None  Available. ACCESSION NUMBER(S): [id number] ORDERING CLINICIAN: BELEN DIXON TECHNIQUE:  Two view(s) of the left tibia and fibula. FINDINGS:  There is no displaced fracture.  The alignment is anatomic.  No soft tissue abnormality is seen.    No acute osseous abnormalities. No radiographic evidence of foreign body. No periosteal new bone or bony lysis to suggest osteomyelitis. Signed by Jorge Kim MD    Vascular US Ankle Brachial Index (CJ) Without Exercise    Result Date: 2/5/2024          [place] [id number]  Tel [phone] and Fax [phone]  Vascular Lab Report VAS US ANKLE BRACHIAL INDEX (CJ) WITHOUT EXERCISE  Patient Name:      BOOKER Stover Physician: [id number] Shirlene Gonzalez MD Study Date:        2/5/2024            Ordering           [id number] PONCHO LEIVA                                        Physician: MRN/PID:           [id number]            Technologist:      Ria Peterson HERMILO Accession#:        [id number]        Technologist 2: Date of Birth/Age: 1955 / 68 years Encounter#:        [id number] Gender:            F Admission Status:  Outpatient          Location           [facility]                                        Performed:  Diagnosis/ICD: Peripheral vascular disease, unspecified-I73.9 CPT Codes:     09670 Peripheral artery CJ Only  CONCLUSIONS: Right Lower PVR: No evidence of arterial occlusive disease in the right lower extremity at rest. Normal digital perfusion noted. Triphasic flow is noted in the right common femoral artery, right posterior tibial artery and right dorsalis pedis artery. Left Lower PVR: No evidence of arterial occlusive disease in the left lower extremity at rest. Normal digital perfusion noted. Triphasic flow is noted in the left common femoral artery, left posterior tibial artery and left dorsalis pedis artery.  Imaging & Doppler  Findings:  RIGHT Lower PVR                Pressures Ratios Right Posterior Tibial (Ankle) 154 mmHg  1.15 Right Dorsalis Pedis (Ankle)   143 mmHg  1.07 Right Digit (Great Toe)        86 mmHg   0.64   LEFT Lower PVR                Pressures Ratios Left Posterior Tibial (Ankle) 146 mmHg  1.09 Left Dorsalis Pedis (Ankle)   159 mmHg  1.19 Left Digit (Great Toe)        96 mmHg   0.72                     Right     Left Brachial Pressure 134 mmHg 130 mmHg   [id number] Shirlene Gonzalez MD Electronically signed by [id number] Shirlene Gonzalez MD on 2/5/2024 at 2:44:32 PM  ** Final **        Assessment/Plan   Principal Problem:  Left lower extremity cellulitis  Failure of outpatient treatment  Diarrhea    Claudia Coffey is a 68 y.o. female with a past medical history of cervical disc disorder, fatty liver infiltration, history of osteopenia, history of pancreatitis and many other comorbidities who was admitted to the hospital for left lower extremity cellulitis.    Left lower extremity cellulitis  -Patient failed outpatient treatment with oral antibiotics  -Will admit to general medicine for IV antibiotics  -Keep the left lower extremity elevated  -Will consult infectious disease  -Tylenol as needed for pain and fever    Diarrhea  -Likely due to antibiotics  -Rule out C. Difficile  -Follow-up stool studies.  -Will monitor    DVT prophylaxis  -Lovenox                 Liliam Gil MD

## 2024-03-01 LAB
ALBUMIN SERPL BCP-MCNC: 3.8 G/DL (ref 3.4–5)
ANION GAP SERPL CALC-SCNC: 12 MMOL/L (ref 10–20)
BUN SERPL-MCNC: 6 MG/DL (ref 6–23)
C DIFF TOX A+B STL QL IA: POSITIVE
CALCIUM SERPL-MCNC: 8.7 MG/DL (ref 8.6–10.3)
CHLORIDE SERPL-SCNC: 103 MMOL/L (ref 98–107)
CO2 SERPL-SCNC: 28 MMOL/L (ref 21–32)
CREAT SERPL-MCNC: 0.58 MG/DL (ref 0.5–1.05)
CRP SERPL-MCNC: 9.73 MG/DL
EGFRCR SERPLBLD CKD-EPI 2021: >90 ML/MIN/1.73M*2
ERYTHROCYTE [DISTWIDTH] IN BLOOD BY AUTOMATED COUNT: 14.1 % (ref 11.5–14.5)
GLUCOSE SERPL-MCNC: 122 MG/DL (ref 74–99)
HCT VFR BLD AUTO: 39.1 % (ref 36–46)
HGB BLD-MCNC: 12.4 G/DL (ref 12–16)
MCH RBC QN AUTO: 27.9 PG (ref 26–34)
MCHC RBC AUTO-ENTMCNC: 31.7 G/DL (ref 32–36)
MCV RBC AUTO: 88 FL (ref 80–100)
NRBC BLD-RTO: 0 /100 WBCS (ref 0–0)
PHOSPHATE SERPL-MCNC: 2.4 MG/DL (ref 2.5–4.9)
PLATELET # BLD AUTO: 286 X10*3/UL (ref 150–450)
POTASSIUM SERPL-SCNC: 3.7 MMOL/L (ref 3.5–5.3)
PROCALCITONIN SERPL-MCNC: 0.17 NG/ML
RBC # BLD AUTO: 4.45 X10*6/UL (ref 4–5.2)
SODIUM SERPL-SCNC: 139 MMOL/L (ref 136–145)
WBC # BLD AUTO: 9.4 X10*3/UL (ref 4.4–11.3)

## 2024-03-01 PROCEDURE — 86140 C-REACTIVE PROTEIN: CPT | Performed by: INTERNAL MEDICINE

## 2024-03-01 PROCEDURE — 99233 SBSQ HOSP IP/OBS HIGH 50: CPT | Performed by: INTERNAL MEDICINE

## 2024-03-01 PROCEDURE — 84145 PROCALCITONIN (PCT): CPT | Mod: GEALAB | Performed by: INTERNAL MEDICINE

## 2024-03-01 PROCEDURE — 1100000001 HC PRIVATE ROOM DAILY

## 2024-03-01 PROCEDURE — 2500000001 HC RX 250 WO HCPCS SELF ADMINISTERED DRUGS (ALT 637 FOR MEDICARE OP): Performed by: INTERNAL MEDICINE

## 2024-03-01 PROCEDURE — 87506 IADNA-DNA/RNA PROBE TQ 6-11: CPT | Mod: GEALAB | Performed by: INTERNAL MEDICINE

## 2024-03-01 PROCEDURE — 80069 RENAL FUNCTION PANEL: CPT | Performed by: INTERNAL MEDICINE

## 2024-03-01 PROCEDURE — 36415 COLL VENOUS BLD VENIPUNCTURE: CPT | Performed by: INTERNAL MEDICINE

## 2024-03-01 PROCEDURE — 2500000004 HC RX 250 GENERAL PHARMACY W/ HCPCS (ALT 636 FOR OP/ED): Performed by: INTERNAL MEDICINE

## 2024-03-01 PROCEDURE — 85027 COMPLETE CBC AUTOMATED: CPT | Performed by: INTERNAL MEDICINE

## 2024-03-01 PROCEDURE — 2500000002 HC RX 250 W HCPCS SELF ADMINISTERED DRUGS (ALT 637 FOR MEDICARE OP, ALT 636 FOR OP/ED): Performed by: INTERNAL MEDICINE

## 2024-03-01 RX ORDER — VANCOMYCIN HYDROCHLORIDE 125 MG/1
125 CAPSULE ORAL 4 TIMES DAILY
Status: DISCONTINUED | OUTPATIENT
Start: 2024-03-01 | End: 2024-03-04 | Stop reason: HOSPADM

## 2024-03-01 RX ADMIN — ENOXAPARIN SODIUM 40 MG: 40 INJECTION SUBCUTANEOUS at 10:20

## 2024-03-01 RX ADMIN — GABAPENTIN 100 MG: 100 CAPSULE ORAL at 10:20

## 2024-03-01 RX ADMIN — Medication 5 MG: at 20:41

## 2024-03-01 RX ADMIN — CEFAZOLIN SODIUM 2 G: 2 INJECTION, SOLUTION INTRAVENOUS at 10:22

## 2024-03-01 RX ADMIN — ROSUVASTATIN CALCIUM 5 MG: 10 TABLET, FILM COATED ORAL at 10:20

## 2024-03-01 RX ADMIN — ACETAMINOPHEN 975 MG: 325 TABLET ORAL at 15:54

## 2024-03-01 RX ADMIN — GABAPENTIN 100 MG: 100 CAPSULE ORAL at 20:41

## 2024-03-01 RX ADMIN — CEFAZOLIN SODIUM 2 G: 2 INJECTION, SOLUTION INTRAVENOUS at 23:40

## 2024-03-01 RX ADMIN — CEFAZOLIN SODIUM 2 G: 2 INJECTION, SOLUTION INTRAVENOUS at 00:42

## 2024-03-01 RX ADMIN — VANCOMYCIN HYDROCHLORIDE 125 MG: 125 CAPSULE ORAL at 20:41

## 2024-03-01 RX ADMIN — CEFAZOLIN SODIUM 2 G: 2 INJECTION, SOLUTION INTRAVENOUS at 16:28

## 2024-03-01 RX ADMIN — FLUTICASONE PROPIONATE 1 SPRAY: 50 SPRAY, METERED NASAL at 10:19

## 2024-03-01 RX ADMIN — VANCOMYCIN HYDROCHLORIDE 125 MG: 125 CAPSULE ORAL at 16:28

## 2024-03-01 RX ADMIN — VANCOMYCIN HYDROCHLORIDE 125 MG: 125 CAPSULE ORAL at 10:22

## 2024-03-01 RX ADMIN — Medication 1 TABLET: at 10:20

## 2024-03-01 RX ADMIN — ACETAMINOPHEN 975 MG: 325 TABLET ORAL at 23:40

## 2024-03-01 RX ADMIN — ACETAMINOPHEN 975 MG: 325 TABLET ORAL at 10:22

## 2024-03-01 RX ADMIN — VANCOMYCIN HYDROCHLORIDE 125 MG: 125 CAPSULE ORAL at 13:25

## 2024-03-01 ASSESSMENT — COGNITIVE AND FUNCTIONAL STATUS - GENERAL
DAILY ACTIVITIY SCORE: 24
MOBILITY SCORE: 24
DAILY ACTIVITIY SCORE: 24
MOBILITY SCORE: 24

## 2024-03-01 ASSESSMENT — PAIN SCALES - GENERAL
PAINLEVEL_OUTOF10: 0 - NO PAIN
PAINLEVEL_OUTOF10: 2

## 2024-03-01 ASSESSMENT — PAIN - FUNCTIONAL ASSESSMENT: PAIN_FUNCTIONAL_ASSESSMENT: 0-10

## 2024-03-01 ASSESSMENT — ENCOUNTER SYMPTOMS: LEG PAIN: 1

## 2024-03-01 NOTE — PROGRESS NOTES
Claudia Coffey is a 68 y.o. female on day 1 of admission presenting with Failure of outpatient treatment.    Subjective   Interval History: no fever, less diarrhea        Review of Systems    Objective   Range of Vitals (last 24 hours)  Heart Rate:  []   Temp:  [36.4 °C (97.5 °F)-36.7 °C (98.1 °F)]   Resp:  [19-20]   BP: (110-142)/(62-77)   SpO2:  [90 %-95 %]   Daily Weight  02/28/24 : 86.2 kg (190 lb)    Body mass index is 30.68 kg/m².    Physical Exam  Constitutional:       Appearance: Normal appearance.   HENT:      Head: Normocephalic and atraumatic.      Mouth/Throat:      Mouth: Mucous membranes are moist.      Pharynx: Oropharynx is clear.   Eyes:      Pupils: Pupils are equal, round, and reactive to light.   Cardiovascular:      Rate and Rhythm: Normal rate and regular rhythm.      Heart sounds: Normal heart sounds.   Pulmonary:      Effort: Pulmonary effort is normal.      Breath sounds: Normal breath sounds.   Abdominal:      General: Abdomen is flat. Bowel sounds are normal.      Palpations: Abdomen is soft.   Musculoskeletal:      Cervical back: Normal range of motion.      Comments: Less redness in the Lt leg   Neurological:      Mental Status: She is alert.         Antibiotics  sodium chloride 0.9 % bolus 1,000 mL  vancomycin (Vancocin) placeholder  piperacillin-tazobactam-dextrose (Zosyn) IV 3.375 g  vancomycin (Vancocin) in dextrose 5 % water (D5W) 250 mL IV 1,250 mg  vancomycin (Vancocin) in dextrose 5% 250 mL IV 1,250 mg  acetaminophen (Tylenol) tablet 650 mg  multivitamin with minerals 1 tablet  fluticasone (Flonase) nasal spray 1 spray  rosuvastatin (Crestor) tablet 5 mg  albuterol 90 mcg/actuation inhaler 2 puff  fluticasone furoate-vilanteroL (Breo Ellipta) 100-25 mcg/dose inhaler 1 puff  montelukast (Singulair) tablet 10 mg  polyethylene glycol (Glycolax, Miralax) packet 17 g  enoxaparin (Lovenox) syringe 40 mg  ceFAZolin in dextrose (iso-os) (Ancef) IVPB 2 g  acetaminophen (Tylenol)  tablet 975 mg  gabapentin (Neurontin) capsule 100 mg  oxyCODONE (Roxicodone) immediate release tablet 2.5 mg  oxyCODONE (Roxicodone) immediate release tablet 5 mg  polyethylene glycol (Glycolax, Miralax) packet 17 g  melatonin tablet 5 mg  vancomycin (Vancocin) capsule 125 mg      Relevant Results  Labs  Results from last 72 hours   Lab Units 03/01/24  0756 02/29/24  0836 02/28/24 1912   WBC AUTO x10*3/uL 9.4 15.3* 22.0*   HEMOGLOBIN g/dL 12.4 12.0 13.8   HEMATOCRIT % 39.1 36.9 43.1   PLATELETS AUTO x10*3/uL 286 314 380   NEUTROS PCT AUTO %  --   --  81.0   LYMPHS PCT AUTO %  --   --  7.8   MONOS PCT AUTO %  --   --  10.2   EOS PCT AUTO %  --   --  0.3     Results from last 72 hours   Lab Units 03/01/24  0756 02/28/24 1912   SODIUM mmol/L 139 138   POTASSIUM mmol/L 3.7 3.6   CHLORIDE mmol/L 103 101   CO2 mmol/L 28 26   BUN mg/dL 6 9   CREATININE mg/dL 0.58 0.71   GLUCOSE mg/dL 122* 127*   CALCIUM mg/dL 8.7 9.7   ANION GAP mmol/L 12 15   EGFR mL/min/1.73m*2 >90 >90   PHOSPHORUS mg/dL 2.4*  --      Results from last 72 hours   Lab Units 03/01/24  0756 02/28/24 1912   ALK PHOS U/L  --  72   BILIRUBIN TOTAL mg/dL  --  0.4   PROTEIN TOTAL g/dL  --  7.7   ALT U/L  --  48*   AST U/L  --  32   ALBUMIN g/dL 3.8 4.4     Estimated Creatinine Clearance: 105.6 mL/min (by C-G formula based on SCr of 0.58 mg/dL).  C-Reactive Protein   Date Value Ref Range Status   03/01/2024 9.73 (H) <1.00 mg/dL Final   02/28/2024 0.67 <1.00 mg/dL Final   02/11/2024 2.47 (H) <1.00 mg/dL Final     Microbiology  Reviewed  Imaging  reviewed        Assessment/Plan   Left leg stasis /cellulitis  Leukocytosis  Diarrhea, C. Diff positive     Recommendations :  Cefazolin pending the cultures  Oral Vancomycin was started     I spent minutes in the professional and overall care of this patient.      Rashi Hinkle MD

## 2024-03-01 NOTE — PROGRESS NOTES
03/01/24 1124   Discharge Planning   Living Arrangements Spouse/significant other   Support Systems Spouse/significant other   Assistance Needed A&Ox3, independent with ADLs, no DME, room air at baseline, drives   Type of Residence Private residence   Number of Stairs to Enter Residence 0   Number of Stairs Within Residence 0   Do you have animals or pets at home? Yes   Type of Animals or Pets 1 cat, Bigg   Home or Post Acute Services None   Patient expects to be discharged to: Home no needs

## 2024-03-01 NOTE — PROGRESS NOTES
[facility] Hospitalist Progress Note       [phone]: Please page me for patient care issues.  [phone]: Please page night hospitalist for any issues.     Claudia Coffey  :  1955(68 y.o.)  MRN:  [id number]  PCP: Solitario Cole DO      Principal Problem:    Failure of outpatient treatment      Assessment and Plan:     Claudia Coffey is a 68 y.o. female with PMH cervical disc disorder, fatty liver, osteopenia, pancreatitis, COPD, former smoker. Patient was initially admitted (24-24) LLE cellulitis w/o abscess due to penetrating trauma w/o foreign body confirmed with CT scan.  She had a follow-up with her primary care physician on the .  At which time levofloxacin was started.  She continued to have intermittent fever and chills while on the antibiotics.  She also reports nausea, anorexia and loose stool and abdominal cramping.  She came back to the hospital today because she continued to have fever and chills with persistent LLE erythema, warmth and tenderness.  Patient was admitted for recurrent LLE cellulitis with failed outpatient therapy.     # Recurrent Left lower extremity cellulitis  # C diff diarreha  # Failed outpatient therapy  # COPD w/o exacerbation on bronchodilators  # Former smoker  -on cefazolin   -started on PO vanc  -LE US w/o e/o DVT  -resume rest of home meds as indicated  -ID recs appreciated    DVT Prophylaxis: Subq Lovenox    Code status: Full Code  Diet: Adult diet Regular    Disposition:await test results, await clinical improvement, and anticipate discharge 2-3days    Level of MDM:  Moderate    I personally examined the patient and I personally reviewed chart, data, labs radiology reports    Family Communication  Number :   Name of Designated Family Representative:       Total time spent: 35 minutes, of total time providing counseling or in coordination of care. Total time on this day of visit includes record and documentation review before and after visit including  documentation and time not explicitly included on EMR time stamp      Subjective:   Interval History:  HPI  The patient complains of LLE cellulitis  The patient feels their symptoms areimproving  no events or new concerns    Scheduled Meds:acetaminophen, 975 mg, oral, q8h  ceFAZolin, 2 g, intravenous, q8h  enoxaparin, 40 mg, subcutaneous, q24h  fluticasone, 1 spray, Each Nostril, Daily  fluticasone furoate-vilanteroL, 1 puff, inhalation, Daily  gabapentin, 100 mg, oral, BID  melatonin, 5 mg, oral, Nightly  montelukast, 10 mg, oral, Nightly  multivitamin with minerals, 1 tablet, oral, Daily  polyethylene glycol, 17 g, oral, Nightly  rosuvastatin, 5 mg, oral, Daily  vancomycin, 125 mg, oral, 4x daily      Continuous Infusions:   PRN Meds:PRN medications: albuterol, oxyCODONE **OR** oxyCODONE, polyethylene glycol    Review of Systems   All other systems reviewed and are negative.    Interval Pertinent History:  Social History     Tobacco Use    Smoking status: Former     Packs/day: 1.00     Years: 30.00     Additional pack years: 0.00     Total pack years: 30.00     Types: Cigarettes     Start date:      Quit date:      Years since quittin.1    Smokeless tobacco: Never   Substance Use Topics    Alcohol use: Never         Objective:   Patient Vitals for the past 24 hrs:   BP Temp Temp src Pulse Resp SpO2   24 1005 110/65 -- -- 85 20 91 %   24 0545 142/77 36.6 °C (97.9 °F) Temporal 89 -- 90 %   24 0030 115/62 36.5 °C (97.7 °F) Temporal 67 -- 93 %   24 1945 115/68 36.4 °C (97.5 °F) Temporal 86 20 93 %   24 1500 -- -- -- -- -- 95 %   24 1459 123/71 36.7 °C (98.1 °F) Temporal (!) 111 19 91 %         Average, Min, and Max for last 24 hours Vitals:  TEMPERATURE:  Temp  Av.6 °C (97.8 °F)  Min: 36.4 °C (97.5 °F)  Max: 36.7 °C (98.1 °F)    RESPIRATIONS RANGE: Resp  Av.7  Min: 19  Max: 20    PULSE RANGE: Pulse  Av.6  Min: 67  Max: 111    BLOOD PRESSURE RANGE:   Systolic (24hrs), Av , Min:110 , Max:142   ; Diastolic (24hrs), Av, Min:62, Max:77      PULSE OXIMETRY RANGE: SpO2  Av.2 %  Min: 90 %  Max: 95 %    I/O last 3 completed shifts:  In: 1500 (17.4 mL/kg) [IV Piggyback:1500]  Out: 1175 (13.6 mL/kg) [Urine:1175 (0.4 mL/kg/hr)]  Weight: 86.2 kg     Physical Exam  Vitals and nursing note reviewed.   Constitutional:       Appearance: Normal appearance.   HENT:      Head: Normocephalic and atraumatic.      Right Ear: External ear normal.      Left Ear: External ear normal.      Nose: Nose normal.      Mouth/Throat:      Mouth: Mucous membranes are moist.   Eyes:      General: No scleral icterus.        Right eye: No discharge.         Left eye: No discharge.      Extraocular Movements: Extraocular movements intact.      Conjunctiva/sclera: Conjunctivae normal.      Pupils: Pupils are equal, round, and reactive to light.   Cardiovascular:      Rate and Rhythm: Normal rate and regular rhythm.   Pulmonary:      Effort: Pulmonary effort is normal.      Breath sounds: Normal breath sounds.   Abdominal:      General: Abdomen is flat. Bowel sounds are normal.      Palpations: Abdomen is soft.   Musculoskeletal:         General: Normal range of motion.      Right lower leg: No swelling or tenderness. No edema.      Left lower leg: Tenderness (improving erythema, warm to touch) present. No swelling. No edema.   Skin:     General: Skin is warm and dry.      Capillary Refill: Capillary refill takes less than 2 seconds.      Findings: Erythema (improving) present. No abrasion or abscess.   Neurological:      General: No focal deficit present.   Psychiatric:         Mood and Affect: Mood normal.         Thought Content: Thought content normal.         Judgment: Judgment normal.               Lab Results   Component Value Date     2024    K 3.7 2024     2024    CO2 28 2024    BUN 6 2024    CREATININE 0.58 2024    GLUCOSE 122 (H)  "03/01/2024    CALCIUM 8.7 03/01/2024    PROT 7.7 02/28/2024    BILITOT 0.4 02/28/2024    ALKPHOS 72 02/28/2024    AST 32 02/28/2024    ALT 48 (H) 02/28/2024       Lab Results   Component Value Date    WBC 9.4 03/01/2024    HGB 12.4 03/01/2024    HCT 39.1 03/01/2024    MCV 88 03/01/2024     03/01/2024    LYMPHOPCT 7.8 02/28/2024    RBC 4.45 03/01/2024    MCH 27.9 03/01/2024    MCHC 31.7 (L) 03/01/2024    RDW 14.1 03/01/2024    CRP 9.73 (H) 03/01/2024       No components found for: \"LABA1C\"  No components found for: \"LFT\"    IMAGES:  Encounter Date: 02/28/24   ECG 12 lead   Result Value    Ventricular Rate 100    Atrial Rate 100    ME Interval 132    QRS Duration 86    QT Interval 354    QTC Calculation(Bazett) 456    P Axis 37    R Axis 19    T Axis 58    QRS Count 16    Q Onset 220    P Onset 154    P Offset 190    T Offset 397    QTC Fredericia 420    Narrative    Normal sinus rhythm  Low voltage QRS  Borderline ECG  When compared with ECG of 07-FEB-2024 20:19,  No significant change was found        No echocardiogram results found for the past 12 months  === 02/28/24 ===    XR TIBIA FIBULA 2 VIEWS LEFT    - Impression -  Soft tissue swelling, with no specific findings of osteomyelitis.      MACRO:  None    Signed by: Layne Maravilla 2/28/2024 7:27 PM  Dictation workstation:   [id number]  === 02/07/24 ===    CT TIBIA FIBULA LEFT W IV CONTRAST    - Impression -  Cellulitis without CT evidence of abscess, deep compartment infection  or acute osteomyelitis.      MACRO:  None    Signed by: Leeanna French 2/8/2024 12:35 PM  Dictation workstation:   [id number]  === 02/28/24 ===    XR TIBIA FIBULA 2 VIEWS LEFT    - Impression -  Soft tissue swelling, with no specific findings of osteomyelitis.      MACRO:  None    Signed by: Layne Maravilla 2/28/2024 7:27 PM  Dictation workstation:   [id number]  === 02/17/16 ===    MR 3D POST PROCESSING W REPORT 2ND WORKSTATION    Additional results of the last 24 hours have been " reviewed.    Dictated using [employer] Version 2.4  Proof read however unrecognized voice recognition errors may have occurred     Electronically signed by Adolfo Martinez DO on 03/01/24 at 1:55 PM

## 2024-03-02 LAB
ALBUMIN SERPL BCP-MCNC: 3.7 G/DL (ref 3.4–5)
ANION GAP SERPL CALC-SCNC: 12 MMOL/L (ref 10–20)
BUN SERPL-MCNC: 6 MG/DL (ref 6–23)
CALCIUM SERPL-MCNC: 8.6 MG/DL (ref 8.6–10.3)
CHLORIDE SERPL-SCNC: 105 MMOL/L (ref 98–107)
CO2 SERPL-SCNC: 27 MMOL/L (ref 21–32)
CREAT SERPL-MCNC: 0.59 MG/DL (ref 0.5–1.05)
CRP SERPL-MCNC: 4.29 MG/DL
EGFRCR SERPLBLD CKD-EPI 2021: >90 ML/MIN/1.73M*2
ERYTHROCYTE [DISTWIDTH] IN BLOOD BY AUTOMATED COUNT: 14.1 % (ref 11.5–14.5)
GLUCOSE SERPL-MCNC: 103 MG/DL (ref 74–99)
HCT VFR BLD AUTO: 37.7 % (ref 36–46)
HGB BLD-MCNC: 12 G/DL (ref 12–16)
MCH RBC QN AUTO: 28.2 PG (ref 26–34)
MCHC RBC AUTO-ENTMCNC: 31.8 G/DL (ref 32–36)
MCV RBC AUTO: 89 FL (ref 80–100)
NRBC BLD-RTO: 0 /100 WBCS (ref 0–0)
PHOSPHATE SERPL-MCNC: 3.5 MG/DL (ref 2.5–4.9)
PLATELET # BLD AUTO: 292 X10*3/UL (ref 150–450)
POTASSIUM SERPL-SCNC: 3.5 MMOL/L (ref 3.5–5.3)
RBC # BLD AUTO: 4.25 X10*6/UL (ref 4–5.2)
SODIUM SERPL-SCNC: 140 MMOL/L (ref 136–145)
WBC # BLD AUTO: 5.7 X10*3/UL (ref 4.4–11.3)

## 2024-03-02 PROCEDURE — 85027 COMPLETE CBC AUTOMATED: CPT | Performed by: INTERNAL MEDICINE

## 2024-03-02 PROCEDURE — 2500000002 HC RX 250 W HCPCS SELF ADMINISTERED DRUGS (ALT 637 FOR MEDICARE OP, ALT 636 FOR OP/ED): Performed by: INTERNAL MEDICINE

## 2024-03-02 PROCEDURE — 1100000001 HC PRIVATE ROOM DAILY

## 2024-03-02 PROCEDURE — 2500000001 HC RX 250 WO HCPCS SELF ADMINISTERED DRUGS (ALT 637 FOR MEDICARE OP): Performed by: INTERNAL MEDICINE

## 2024-03-02 PROCEDURE — 36415 COLL VENOUS BLD VENIPUNCTURE: CPT | Performed by: INTERNAL MEDICINE

## 2024-03-02 PROCEDURE — 86140 C-REACTIVE PROTEIN: CPT | Performed by: INTERNAL MEDICINE

## 2024-03-02 PROCEDURE — 84145 PROCALCITONIN (PCT): CPT | Mod: GEALAB | Performed by: INTERNAL MEDICINE

## 2024-03-02 PROCEDURE — 80069 RENAL FUNCTION PANEL: CPT | Performed by: INTERNAL MEDICINE

## 2024-03-02 PROCEDURE — 2500000004 HC RX 250 GENERAL PHARMACY W/ HCPCS (ALT 636 FOR OP/ED): Performed by: INTERNAL MEDICINE

## 2024-03-02 PROCEDURE — 99232 SBSQ HOSP IP/OBS MODERATE 35: CPT | Performed by: INTERNAL MEDICINE

## 2024-03-02 PROCEDURE — 2500000005 HC RX 250 GENERAL PHARMACY W/O HCPCS: Performed by: INTERNAL MEDICINE

## 2024-03-02 RX ADMIN — Medication 1 L/MIN: at 21:57

## 2024-03-02 RX ADMIN — ACETAMINOPHEN 975 MG: 325 TABLET ORAL at 06:25

## 2024-03-02 RX ADMIN — GABAPENTIN 100 MG: 100 CAPSULE ORAL at 22:05

## 2024-03-02 RX ADMIN — ROSUVASTATIN CALCIUM 5 MG: 10 TABLET, FILM COATED ORAL at 08:19

## 2024-03-02 RX ADMIN — CEFAZOLIN SODIUM 2 G: 2 INJECTION, SOLUTION INTRAVENOUS at 08:20

## 2024-03-02 RX ADMIN — VANCOMYCIN HYDROCHLORIDE 125 MG: 125 CAPSULE ORAL at 06:25

## 2024-03-02 RX ADMIN — CEFAZOLIN SODIUM 2 G: 2 INJECTION, SOLUTION INTRAVENOUS at 16:30

## 2024-03-02 RX ADMIN — ACETAMINOPHEN 975 MG: 325 TABLET ORAL at 15:24

## 2024-03-02 RX ADMIN — FLUTICASONE PROPIONATE 1 SPRAY: 50 SPRAY, METERED NASAL at 09:00

## 2024-03-02 RX ADMIN — ENOXAPARIN SODIUM 40 MG: 40 INJECTION SUBCUTANEOUS at 08:20

## 2024-03-02 RX ADMIN — VANCOMYCIN HYDROCHLORIDE 125 MG: 125 CAPSULE ORAL at 16:30

## 2024-03-02 RX ADMIN — Medication 5 MG: at 21:56

## 2024-03-02 RX ADMIN — GABAPENTIN 100 MG: 100 CAPSULE ORAL at 08:19

## 2024-03-02 RX ADMIN — Medication 1 L/MIN: at 08:50

## 2024-03-02 RX ADMIN — FLUTICASONE FUROATE AND VILANTEROL TRIFENATATE 1 PUFF: 100; 25 POWDER RESPIRATORY (INHALATION) at 07:00

## 2024-03-02 RX ADMIN — VANCOMYCIN HYDROCHLORIDE 125 MG: 125 CAPSULE ORAL at 21:56

## 2024-03-02 RX ADMIN — VANCOMYCIN HYDROCHLORIDE 125 MG: 125 CAPSULE ORAL at 13:41

## 2024-03-02 RX ADMIN — Medication 1 TABLET: at 08:19

## 2024-03-02 ASSESSMENT — COGNITIVE AND FUNCTIONAL STATUS - GENERAL
DAILY ACTIVITIY SCORE: 24
MOBILITY SCORE: 24
MOBILITY SCORE: 24
DAILY ACTIVITIY SCORE: 24

## 2024-03-02 ASSESSMENT — PAIN SCALES - GENERAL
PAINLEVEL_OUTOF10: 1
PAINLEVEL_OUTOF10: 0 - NO PAIN

## 2024-03-02 ASSESSMENT — PAIN - FUNCTIONAL ASSESSMENT
PAIN_FUNCTIONAL_ASSESSMENT: 0-10
PAIN_FUNCTIONAL_ASSESSMENT: 0-10

## 2024-03-02 NOTE — CARE PLAN
The patient's goals for the shift include  pt will have decreased diarrhea throughout shift    The clinical goals for the shift include pt will have decreased diarrhea      Problem: Pain  Goal: My pain/discomfort is manageable  Outcome: Progressing     Problem: Safety  Goal: Patient will be injury free during hospitalization  Outcome: Progressing  Goal: I will remain free of falls  Outcome: Progressing

## 2024-03-02 NOTE — PROGRESS NOTES
Claudia Coffey is a 68 y.o. female on day 2 of admission presenting with Failure of outpatient treatment.    Subjective   Interval History: no fever, less diarrhea        Review of Systems    Objective   Range of Vitals (last 24 hours)  Heart Rate:  [57-87]   Temp:  [36.3 °C (97.3 °F)-36.6 °C (97.9 °F)]   Resp:  [18-20]   BP: (102-143)/(61-80)   SpO2:  [94 %-96 %]   Daily Weight  02/28/24 : 86.2 kg (190 lb)    Body mass index is 30.68 kg/m².    Physical Exam  Constitutional:       Appearance: Normal appearance.   HENT:      Head: Normocephalic and atraumatic.      Mouth/Throat:      Mouth: Mucous membranes are moist.      Pharynx: Oropharynx is clear.   Eyes:      Pupils: Pupils are equal, round, and reactive to light.   Cardiovascular:      Rate and Rhythm: Normal rate and regular rhythm.      Heart sounds: Normal heart sounds.   Pulmonary:      Effort: Pulmonary effort is normal.      Breath sounds: Normal breath sounds.   Abdominal:      General: Abdomen is flat. Bowel sounds are normal.      Palpations: Abdomen is soft.   Musculoskeletal:      Cervical back: Normal range of motion.      Comments: Less redness in the Lt leg   Neurological:      Mental Status: She is alert.         Antibiotics  sodium chloride 0.9 % bolus 1,000 mL  vancomycin (Vancocin) placeholder  piperacillin-tazobactam-dextrose (Zosyn) IV 3.375 g  vancomycin (Vancocin) in dextrose 5 % water (D5W) 250 mL IV 1,250 mg  vancomycin (Vancocin) in dextrose 5% 250 mL IV 1,250 mg  acetaminophen (Tylenol) tablet 650 mg  multivitamin with minerals 1 tablet  fluticasone (Flonase) nasal spray 1 spray  rosuvastatin (Crestor) tablet 5 mg  albuterol 90 mcg/actuation inhaler 2 puff  fluticasone furoate-vilanteroL (Breo Ellipta) 100-25 mcg/dose inhaler 1 puff  montelukast (Singulair) tablet 10 mg  polyethylene glycol (Glycolax, Miralax) packet 17 g  enoxaparin (Lovenox) syringe 40 mg  ceFAZolin in dextrose (iso-os) (Ancef) IVPB 2 g  acetaminophen (Tylenol)  tablet 975 mg  gabapentin (Neurontin) capsule 100 mg  oxyCODONE (Roxicodone) immediate release tablet 2.5 mg  oxyCODONE (Roxicodone) immediate release tablet 5 mg  polyethylene glycol (Glycolax, Miralax) packet 17 g  melatonin tablet 5 mg  vancomycin (Vancocin) capsule 125 mg      Relevant Results  Labs  Results from last 72 hours   Lab Units 03/02/24 0635 03/01/24 0756 02/29/24 0836 02/28/24 1912   WBC AUTO x10*3/uL 5.7 9.4 15.3* 22.0*   HEMOGLOBIN g/dL 12.0 12.4 12.0 13.8   HEMATOCRIT % 37.7 39.1 36.9 43.1   PLATELETS AUTO x10*3/uL 292 286 314 380   NEUTROS PCT AUTO %  --   --   --  81.0   LYMPHS PCT AUTO %  --   --   --  7.8   MONOS PCT AUTO %  --   --   --  10.2   EOS PCT AUTO %  --   --   --  0.3       Results from last 72 hours   Lab Units 03/02/24 0635 03/01/24 0756 02/28/24 1912   SODIUM mmol/L 140 139 138   POTASSIUM mmol/L 3.5 3.7 3.6   CHLORIDE mmol/L 105 103 101   CO2 mmol/L 27 28 26   BUN mg/dL 6 6 9   CREATININE mg/dL 0.59 0.58 0.71   GLUCOSE mg/dL 103* 122* 127*   CALCIUM mg/dL 8.6 8.7 9.7   ANION GAP mmol/L 12 12 15   EGFR mL/min/1.73m*2 >90 >90 >90   PHOSPHORUS mg/dL 3.5 2.4*  --        Results from last 72 hours   Lab Units 03/02/24 0635 03/01/24 0756 02/28/24 1912   ALK PHOS U/L  --   --  72   BILIRUBIN TOTAL mg/dL  --   --  0.4   PROTEIN TOTAL g/dL  --   --  7.7   ALT U/L  --   --  48*   AST U/L  --   --  32   ALBUMIN g/dL 3.7 3.8 4.4       Estimated Creatinine Clearance: 103.8 mL/min (by C-G formula based on SCr of 0.59 mg/dL).  C-Reactive Protein   Date Value Ref Range Status   03/02/2024 4.29 (H) <1.00 mg/dL Final   03/01/2024 9.73 (H) <1.00 mg/dL Final   02/28/2024 0.67 <1.00 mg/dL Final     Microbiology  Reviewed  Imaging  reviewed        Assessment/Plan   Left leg stasis /cellulitis  Leukocytosis  C. Diff colitis     Recommendations :  Continue Cefazolin and Oral Vancomycin      I spent minutes in the professional and overall care of this patient.      Rashi Hinkle MD

## 2024-03-02 NOTE — CARE PLAN
The clinical goals for the shift include pt will have decreased diarrhea    Pt had uneventful night. No changes. Pt slept uninterrupted. Pt reports diarrhea slowing. No noted edema in LLE. Vitals stable.     Problem: Pain  Goal: My pain/discomfort is manageable  Outcome: Progressing     Problem: Safety  Goal: Patient will be injury free during hospitalization  Outcome: Progressing  Goal: I will remain free of falls  Outcome: Progressing

## 2024-03-02 NOTE — PROGRESS NOTES
[facility] Hospitalist Progress Note       [phone]: Please page me for patient care issues.  [phone]: Please page night hospitalist for any issues.     Claudia Coffey  :  1955(68 y.o.)  MRN:  [id number]  PCP: Solitario Cole DO      Principal Problem:    Failure of outpatient treatment      Assessment and Plan:     Claudia Coffey is a 68 y.o. female with PMH cervical disc disorder, fatty liver, osteopenia, pancreatitis, COPD, former smoker. Patient was initially admitted (24-24) LLE cellulitis w/o abscess due to penetrating trauma w/o foreign body confirmed with CT scan.  She had a follow-up with her primary care physician on the .  At which time levofloxacin was started.  She continued to have intermittent fever and chills while on the antibiotics.  She also reports nausea, anorexia and loose stool and abdominal cramping.  She came back to the hospital today because she continued to have fever and chills with persistent LLE erythema, warmth and tenderness.  Patient was admitted for recurrent LLE cellulitis with failed outpatient therapy.     # Recurrent Left lower extremity cellulitis  # C diff diarreha  # Failed outpatient therapy  # COPD w/o exacerbation on bronchodilators  # Former smoker  -on cefazolin   -on PO vanc  -LE US w/o e/o DVT  -resume rest of home meds as indicated  -ID recs appreciated    DVT Prophylaxis: Subq Lovenox    Code status: Full Code  Diet: Adult diet Regular    Disposition:await test results, await clinical improvement, and anticipate discharge 3/4/24    Level of MDM:  Moderate    I personally examined the patient and I personally reviewed chart, data, labs radiology reports    Family Communication  Number :   Name of Designated Family Representative:       Total time spent: 25 minutes, of total time providing counseling or in coordination of care. Total time on this day of visit includes record and documentation review before and after visit including documentation  and time not explicitly included on EMR time stamp      Subjective:   Interval History:  HPI  The patient complains of LLE cellulitis  The patient feels their symptoms areimproving  no events or new concerns    Scheduled Meds:acetaminophen, 975 mg, oral, q8h  ceFAZolin, 2 g, intravenous, q8h  enoxaparin, 40 mg, subcutaneous, q24h  fluticasone, 1 spray, Each Nostril, Daily  fluticasone furoate-vilanteroL, 1 puff, inhalation, Daily  gabapentin, 100 mg, oral, BID  melatonin, 5 mg, oral, Nightly  montelukast, 10 mg, oral, Nightly  multivitamin with minerals, 1 tablet, oral, Daily  polyethylene glycol, 17 g, oral, Nightly  rosuvastatin, 5 mg, oral, Daily  vancomycin, 125 mg, oral, 4x daily      Continuous Infusions:   PRN Meds:PRN medications: albuterol, oxyCODONE **OR** oxyCODONE, oxygen, polyethylene glycol    Review of Systems   All other systems reviewed and are negative.    Interval Pertinent History:  Social History     Tobacco Use    Smoking status: Former     Packs/day: 1.00     Years: 30.00     Additional pack years: 0.00     Total pack years: 30.00     Types: Cigarettes     Start date:      Quit date:      Years since quittin.1    Smokeless tobacco: Never   Substance Use Topics    Alcohol use: Never         Objective:   Patient Vitals for the past 24 hrs:   BP Temp Temp src Pulse Resp SpO2   24 1006 125/74 -- -- 70 18 95 %   24 0500 113/72 36.3 °C (97.3 °F) Temporal 64 18 95 %   24 0015 102/61 36.4 °C (97.5 °F) Temporal 57 -- 96 %   24 143/80 36.4 °C (97.5 °F) Temporal 87 20 94 %   24 1420 113/68 36.6 °C (97.9 °F) Temporal 75 20 94 %         Average, Min, and Max for last 24 hours Vitals:  TEMPERATURE:  Temp  Av.4 °C (97.6 °F)  Min: 36.3 °C (97.3 °F)  Max: 36.6 °C (97.9 °F)    RESPIRATIONS RANGE: Resp  Av  Min: 18  Max: 20    PULSE RANGE: Pulse  Av.6  Min: 57  Max: 87    BLOOD PRESSURE RANGE:  Systolic (24hrs), Av , Min:102 , Max:143   ;  Diastolic (24hrs), Av, Min:61, Max:80      PULSE OXIMETRY RANGE: SpO2  Av.8 %  Min: 94 %  Max: 96 %    No intake/output data recorded.    Physical Exam  Vitals and nursing note reviewed.   Constitutional:       Appearance: Normal appearance.   HENT:      Head: Normocephalic and atraumatic.      Right Ear: External ear normal.      Left Ear: External ear normal.      Nose: Nose normal.      Mouth/Throat:      Mouth: Mucous membranes are moist.   Eyes:      General: No scleral icterus.        Right eye: No discharge.         Left eye: No discharge.      Extraocular Movements: Extraocular movements intact.      Conjunctiva/sclera: Conjunctivae normal.      Pupils: Pupils are equal, round, and reactive to light.   Cardiovascular:      Rate and Rhythm: Normal rate and regular rhythm.   Pulmonary:      Effort: Pulmonary effort is normal.      Breath sounds: Normal breath sounds.   Abdominal:      General: Abdomen is flat. Bowel sounds are normal.      Palpations: Abdomen is soft.   Musculoskeletal:         General: Normal range of motion.      Right lower leg: No swelling or tenderness. No edema.      Left lower leg: Tenderness (improving erythema, warm to touch) present. No swelling. No edema.   Skin:     General: Skin is warm and dry.      Capillary Refill: Capillary refill takes less than 2 seconds.      Findings: Erythema (improving) present. No abrasion or abscess.   Neurological:      General: No focal deficit present.   Psychiatric:         Mood and Affect: Mood normal.         Thought Content: Thought content normal.         Judgment: Judgment normal.               Lab Results   Component Value Date     2024    K 3.5 2024     2024    CO2 27 2024    BUN 6 2024    CREATININE 0.59 2024    GLUCOSE 103 (H) 2024    CALCIUM 8.6 2024    PROT 7.7 2024    BILITOT 0.4 2024    ALKPHOS 72 2024    AST 32 2024    ALT 48 (H) 2024  "      Lab Results   Component Value Date    WBC 5.7 03/02/2024    HGB 12.0 03/02/2024    HCT 37.7 03/02/2024    MCV 89 03/02/2024     03/02/2024    LYMPHOPCT 7.8 02/28/2024    RBC 4.25 03/02/2024    MCH 28.2 03/02/2024    MCHC 31.8 (L) 03/02/2024    RDW 14.1 03/02/2024    CRP 4.29 (H) 03/02/2024       No components found for: \"LABA1C\"  No components found for: \"LFT\"    IMAGES:  Encounter Date: 02/28/24   ECG 12 lead   Result Value    Ventricular Rate 100    Atrial Rate 100    NH Interval 132    QRS Duration 86    QT Interval 354    QTC Calculation(Bazett) 456    P Axis 37    R Axis 19    T Axis 58    QRS Count 16    Q Onset 220    P Onset 154    P Offset 190    T Offset 397    QTC Fredericia 420    Narrative    Normal sinus rhythm  Low voltage QRS  Borderline ECG  When compared with ECG of 07-FEB-2024 20:19,  No significant change was found        No echocardiogram results found for the past 12 months  === 02/28/24 ===    XR TIBIA FIBULA 2 VIEWS LEFT    - Impression -  Soft tissue swelling, with no specific findings of osteomyelitis.      MACRO:  None    Signed by: Layne Maravilla 2/28/2024 7:27 PM  Dictation workstation:   [id number]  === 02/07/24 ===    CT TIBIA FIBULA LEFT W IV CONTRAST    - Impression -  Cellulitis without CT evidence of abscess, deep compartment infection  or acute osteomyelitis.      MACRO:  None    Signed by: Leeanna French 2/8/2024 12:35 PM  Dictation workstation:   [id number]  === 02/28/24 ===    XR TIBIA FIBULA 2 VIEWS LEFT    - Impression -  Soft tissue swelling, with no specific findings of osteomyelitis.      MACRO:  None    Signed by: Layne Maravilla 2/28/2024 7:27 PM  Dictation workstation:   [id number]  === 02/17/16 ===    MR 3D POST PROCESSING W REPORT 2ND WORKSTATION    Additional results of the last 24 hours have been reviewed.    Dictated using [employer] Version 2.4  Proof read however unrecognized voice recognition errors may have occurred     Electronically " signed by Adolfo Martinez DO on 03/02/24 at 11:49 AM

## 2024-03-03 LAB
ALBUMIN SERPL BCP-MCNC: 3.7 G/DL (ref 3.4–5)
ANION GAP SERPL CALC-SCNC: 13 MMOL/L (ref 10–20)
ATRIAL RATE: 100 BPM
BUN SERPL-MCNC: 7 MG/DL (ref 6–23)
C COLI+JEJ+UPSA DNA STL QL NAA+PROBE: NOT DETECTED
CALCIUM SERPL-MCNC: 8.1 MG/DL (ref 8.6–10.3)
CHLORIDE SERPL-SCNC: 105 MMOL/L (ref 98–107)
CO2 SERPL-SCNC: 26 MMOL/L (ref 21–32)
CREAT SERPL-MCNC: 0.54 MG/DL (ref 0.5–1.05)
CRP SERPL-MCNC: 1.66 MG/DL
EC STX1 GENE STL QL NAA+PROBE: NOT DETECTED
EC STX2 GENE STL QL NAA+PROBE: NOT DETECTED
EGFRCR SERPLBLD CKD-EPI 2021: >90 ML/MIN/1.73M*2
ERYTHROCYTE [DISTWIDTH] IN BLOOD BY AUTOMATED COUNT: 13.7 % (ref 11.5–14.5)
GLUCOSE SERPL-MCNC: 102 MG/DL (ref 74–99)
HCT VFR BLD AUTO: 38.2 % (ref 36–46)
HGB BLD-MCNC: 12.2 G/DL (ref 12–16)
MCH RBC QN AUTO: 28.1 PG (ref 26–34)
MCHC RBC AUTO-ENTMCNC: 31.9 G/DL (ref 32–36)
MCV RBC AUTO: 88 FL (ref 80–100)
NOROVIRUS GI + GII RNA STL NAA+PROBE: NOT DETECTED
NRBC BLD-RTO: 0 /100 WBCS (ref 0–0)
P AXIS: 37 DEGREES
P OFFSET: 190 MS
P ONSET: 154 MS
PHOSPHATE SERPL-MCNC: 3 MG/DL (ref 2.5–4.9)
PLATELET # BLD AUTO: 317 X10*3/UL (ref 150–450)
POTASSIUM SERPL-SCNC: 3.6 MMOL/L (ref 3.5–5.3)
PR INTERVAL: 132 MS
PROCALCITONIN SERPL-MCNC: 0.09 NG/ML
PROCALCITONIN SERPL-MCNC: 0.09 NG/ML
Q ONSET: 220 MS
QRS COUNT: 16 BEATS
QRS DURATION: 86 MS
QT INTERVAL: 354 MS
QTC CALCULATION(BAZETT): 456 MS
QTC FREDERICIA: 420 MS
R AXIS: 19 DEGREES
RBC # BLD AUTO: 4.34 X10*6/UL (ref 4–5.2)
RV RNA STL NAA+PROBE: NOT DETECTED
SALMONELLA DNA STL QL NAA+PROBE: NOT DETECTED
SHIGELLA DNA SPEC QL NAA+PROBE: NOT DETECTED
SODIUM SERPL-SCNC: 140 MMOL/L (ref 136–145)
T AXIS: 58 DEGREES
T OFFSET: 397 MS
V CHOLERAE DNA STL QL NAA+PROBE: NOT DETECTED
VENTRICULAR RATE: 100 BPM
WBC # BLD AUTO: 6.7 X10*3/UL (ref 4.4–11.3)
Y ENTEROCOL DNA STL QL NAA+PROBE: NOT DETECTED

## 2024-03-03 PROCEDURE — 2500000001 HC RX 250 WO HCPCS SELF ADMINISTERED DRUGS (ALT 637 FOR MEDICARE OP): Performed by: INTERNAL MEDICINE

## 2024-03-03 PROCEDURE — 80069 RENAL FUNCTION PANEL: CPT | Performed by: INTERNAL MEDICINE

## 2024-03-03 PROCEDURE — 99232 SBSQ HOSP IP/OBS MODERATE 35: CPT | Performed by: INTERNAL MEDICINE

## 2024-03-03 PROCEDURE — 36415 COLL VENOUS BLD VENIPUNCTURE: CPT | Performed by: INTERNAL MEDICINE

## 2024-03-03 PROCEDURE — 2500000004 HC RX 250 GENERAL PHARMACY W/ HCPCS (ALT 636 FOR OP/ED): Performed by: INTERNAL MEDICINE

## 2024-03-03 PROCEDURE — 84145 PROCALCITONIN (PCT): CPT | Mod: GEALAB | Performed by: INTERNAL MEDICINE

## 2024-03-03 PROCEDURE — 85027 COMPLETE CBC AUTOMATED: CPT | Performed by: INTERNAL MEDICINE

## 2024-03-03 PROCEDURE — 2500000002 HC RX 250 W HCPCS SELF ADMINISTERED DRUGS (ALT 637 FOR MEDICARE OP, ALT 636 FOR OP/ED): Performed by: INTERNAL MEDICINE

## 2024-03-03 PROCEDURE — 86140 C-REACTIVE PROTEIN: CPT | Performed by: INTERNAL MEDICINE

## 2024-03-03 PROCEDURE — 1100000001 HC PRIVATE ROOM DAILY

## 2024-03-03 RX ADMIN — Medication 5 MG: at 21:49

## 2024-03-03 RX ADMIN — ENOXAPARIN SODIUM 40 MG: 40 INJECTION SUBCUTANEOUS at 08:29

## 2024-03-03 RX ADMIN — VANCOMYCIN HYDROCHLORIDE 125 MG: 125 CAPSULE ORAL at 06:39

## 2024-03-03 RX ADMIN — ACETAMINOPHEN 975 MG: 325 TABLET ORAL at 14:40

## 2024-03-03 RX ADMIN — VANCOMYCIN HYDROCHLORIDE 125 MG: 125 CAPSULE ORAL at 21:50

## 2024-03-03 RX ADMIN — ROSUVASTATIN CALCIUM 5 MG: 10 TABLET, FILM COATED ORAL at 08:29

## 2024-03-03 RX ADMIN — CEFAZOLIN SODIUM 2 G: 2 INJECTION, SOLUTION INTRAVENOUS at 00:10

## 2024-03-03 RX ADMIN — ACETAMINOPHEN 975 MG: 325 TABLET ORAL at 08:30

## 2024-03-03 RX ADMIN — GABAPENTIN 100 MG: 100 CAPSULE ORAL at 21:49

## 2024-03-03 RX ADMIN — ACETAMINOPHEN 975 MG: 325 TABLET ORAL at 23:20

## 2024-03-03 RX ADMIN — VANCOMYCIN HYDROCHLORIDE 125 MG: 125 CAPSULE ORAL at 16:33

## 2024-03-03 RX ADMIN — CEFAZOLIN SODIUM 2 G: 2 INJECTION, SOLUTION INTRAVENOUS at 16:34

## 2024-03-03 RX ADMIN — FLUTICASONE PROPIONATE 1 SPRAY: 50 SPRAY, METERED NASAL at 09:00

## 2024-03-03 RX ADMIN — Medication 1 TABLET: at 08:29

## 2024-03-03 RX ADMIN — ACETAMINOPHEN 975 MG: 325 TABLET ORAL at 00:10

## 2024-03-03 RX ADMIN — FLUTICASONE FUROATE AND VILANTEROL TRIFENATATE 1 PUFF: 100; 25 POWDER RESPIRATORY (INHALATION) at 07:00

## 2024-03-03 RX ADMIN — CEFAZOLIN SODIUM 2 G: 2 INJECTION, SOLUTION INTRAVENOUS at 08:29

## 2024-03-03 RX ADMIN — VANCOMYCIN HYDROCHLORIDE 125 MG: 125 CAPSULE ORAL at 12:18

## 2024-03-03 RX ADMIN — GABAPENTIN 100 MG: 100 CAPSULE ORAL at 08:29

## 2024-03-03 ASSESSMENT — COGNITIVE AND FUNCTIONAL STATUS - GENERAL
DAILY ACTIVITIY SCORE: 24
MOBILITY SCORE: 24

## 2024-03-03 ASSESSMENT — PAIN - FUNCTIONAL ASSESSMENT: PAIN_FUNCTIONAL_ASSESSMENT: 0-10

## 2024-03-03 ASSESSMENT — PAIN SCALES - GENERAL: PAINLEVEL_OUTOF10: 0 - NO PAIN

## 2024-03-03 NOTE — CARE PLAN
The patient's goals for the shift include  pt will continue to have decreased loose stools    The clinical goals for the shift include pt will se safe and free of injury through night.      Problem: Pain  Goal: My pain/discomfort is manageable  Outcome: Progressing     Problem: Safety  Goal: Patient will be injury free during hospitalization  Outcome: Progressing  Goal: I will remain free of falls  Outcome: Progressing

## 2024-03-03 NOTE — PROGRESS NOTES
[facility] Hospitalist Progress Note       [phone]: Please page me for patient care issues.  [phone]: Please page night hospitalist for any issues.     Claudia Coffey  :  1955(68 y.o.)  MRN:  [id number]  PCP: Solitario Cole DO      Principal Problem:    Failure of outpatient treatment      Assessment and Plan:     Claudia Coffey is a 68 y.o. female with PMH cervical disc disorder, fatty liver, osteopenia, pancreatitis, COPD, former smoker. Patient was initially admitted (24-24) LLE cellulitis w/o abscess due to penetrating trauma w/o foreign body confirmed with CT scan.  She had a follow-up with her primary care physician on the .  At which time levofloxacin was started.  She continued to have intermittent fever and chills while on the antibiotics.  She also reports nausea, anorexia and loose stool and abdominal cramping.  She came back to the hospital today because she continued to have fever and chills with persistent LLE erythema, warmth and tenderness.  Patient was admitted for recurrent LLE cellulitis with failed outpatient therapy.     # Recurrent Left lower extremity cellulitis  # C diff diarreha  # Failed outpatient therapy  # COPD w/o exacerbation on bronchodilators  # Former smoker  -on cefazolin   -on PO vanc  -LE US w/o e/o DVT  -resume rest of home meds as indicated  -ID recs appreciated    DVT Prophylaxis: Subq Lovenox    Code status: Full Code  Diet: Adult diet Regular    Disposition:anticipate discharge 3/4/24    Level of MDM:  Moderate    I personally examined the patient and I personally reviewed chart, data, labs radiology reports    Family Communication  Number :   Name of Designated Family Representative:       Total time spent: 25 minutes, of total time providing counseling or in coordination of care. Total time on this day of visit includes record and documentation review before and after visit including documentation and time not explicitly included on EMR time  stamp      Subjective:   Interval History:  HPI  The patient complains of LLE cellulitis  The patient feels their symptoms areimproving  no events or new concerns    Scheduled Meds:acetaminophen, 975 mg, oral, q8h  ceFAZolin, 2 g, intravenous, q8h  enoxaparin, 40 mg, subcutaneous, q24h  fluticasone, 1 spray, Each Nostril, Daily  fluticasone furoate-vilanteroL, 1 puff, inhalation, Daily  gabapentin, 100 mg, oral, BID  melatonin, 5 mg, oral, Nightly  montelukast, 10 mg, oral, Nightly  multivitamin with minerals, 1 tablet, oral, Daily  polyethylene glycol, 17 g, oral, Nightly  rosuvastatin, 5 mg, oral, Daily  vancomycin, 125 mg, oral, 4x daily      Continuous Infusions:   PRN Meds:PRN medications: albuterol, oxyCODONE **OR** oxyCODONE, oxygen, polyethylene glycol    Review of Systems   All other systems reviewed and are negative.    Interval Pertinent History:  Social History     Tobacco Use    Smoking status: Former     Packs/day: 1.00     Years: 30.00     Additional pack years: 0.00     Total pack years: 30.00     Types: Cigarettes     Start date:      Quit date:      Years since quittin.1    Smokeless tobacco: Never   Substance Use Topics    Alcohol use: Never         Objective:   Patient Vitals for the past 24 hrs:   BP Temp Temp src Pulse Resp SpO2   24 1344 103/53 36 °C (96.8 °F) Temporal 76 16 96 %   24 0900 137/61 36 °C (96.8 °F) Temporal 65 16 95 %   24 0644 138/71 -- -- 74 16 --   24 2157 141/78 -- -- 80 16 --         Average, Min, and Max for last 24 hours Vitals:  TEMPERATURE:  Temp  Av °C (96.8 °F)  Min: 36 °C (96.8 °F)  Max: 36 °C (96.8 °F)    RESPIRATIONS RANGE: Resp  Av  Min: 16  Max: 16    PULSE RANGE: Pulse  Av.8  Min: 65  Max: 80    BLOOD PRESSURE RANGE:  Systolic (24hrs), Av , Min:103 , Max:141   ; Diastolic (24hrs), Av, Min:53, Max:78      PULSE OXIMETRY RANGE: SpO2  Av.5 %  Min: 95 %  Max: 96 %    I/O last 3 completed shifts:  In:  1300 (15.1 mL/kg) [P.O.:1200; IV Piggyback:100]  Out: - (0 mL/kg)   Weight: 86.2 kg     Physical Exam  Vitals and nursing note reviewed.   Constitutional:       Appearance: Normal appearance.   HENT:      Head: Normocephalic and atraumatic.      Right Ear: External ear normal.      Left Ear: External ear normal.      Nose: Nose normal.      Mouth/Throat:      Mouth: Mucous membranes are moist.   Eyes:      General: No scleral icterus.        Right eye: No discharge.         Left eye: No discharge.      Extraocular Movements: Extraocular movements intact.      Conjunctiva/sclera: Conjunctivae normal.      Pupils: Pupils are equal, round, and reactive to light.   Cardiovascular:      Rate and Rhythm: Normal rate and regular rhythm.   Pulmonary:      Effort: Pulmonary effort is normal.      Breath sounds: Normal breath sounds.   Abdominal:      General: Abdomen is flat. Bowel sounds are normal.      Palpations: Abdomen is soft.   Musculoskeletal:         General: Normal range of motion.      Right lower leg: No swelling or tenderness. No edema.      Left lower leg: Tenderness (improving erythema, warm to touch) present. No swelling. No edema.   Skin:     General: Skin is warm and dry.      Capillary Refill: Capillary refill takes less than 2 seconds.      Findings: Erythema (improving) present. No abrasion or abscess.   Neurological:      General: No focal deficit present.   Psychiatric:         Mood and Affect: Mood normal.         Thought Content: Thought content normal.         Judgment: Judgment normal.               Lab Results   Component Value Date     03/03/2024    K 3.6 03/03/2024     03/03/2024    CO2 26 03/03/2024    BUN 7 03/03/2024    CREATININE 0.54 03/03/2024    GLUCOSE 102 (H) 03/03/2024    CALCIUM 8.1 (L) 03/03/2024    PROT 7.7 02/28/2024    BILITOT 0.4 02/28/2024    ALKPHOS 72 02/28/2024    AST 32 02/28/2024    ALT 48 (H) 02/28/2024       Lab Results   Component Value Date    WBC 6.7  "03/03/2024    HGB 12.2 03/03/2024    HCT 38.2 03/03/2024    MCV 88 03/03/2024     03/03/2024    LYMPHOPCT 7.8 02/28/2024    RBC 4.34 03/03/2024    MCH 28.1 03/03/2024    MCHC 31.9 (L) 03/03/2024    RDW 13.7 03/03/2024    CRP 1.66 (H) 03/03/2024       No components found for: \"LABA1C\"  No components found for: \"LFT\"    IMAGES:  Encounter Date: 02/28/24   ECG 12 lead   Result Value    Ventricular Rate 100    Atrial Rate 100    AR Interval 132    QRS Duration 86    QT Interval 354    QTC Calculation(Bazett) 456    P Axis 37    R Axis 19    T Axis 58    QRS Count 16    Q Onset 220    P Onset 154    P Offset 190    T Offset 397    QTC Fredericia 420    Narrative    Normal sinus rhythm  Low voltage QRS  Borderline ECG  When compared with ECG of 07-FEB-2024 20:19,  No significant change was found  See ED provider note for full interpretation and clinical correlation  Confirmed by Nieves Garcia ([id number]) on 3/3/2024 1:41:26 PM        No echocardiogram results found for the past 12 months  === 02/28/24 ===    XR TIBIA FIBULA 2 VIEWS LEFT    - Impression -  Soft tissue swelling, with no specific findings of osteomyelitis.      MACRO:  None    Signed by: Layne Maravilla 2/28/2024 7:27 PM  Dictation workstation:   [id number]  === 02/07/24 ===    CT TIBIA FIBULA LEFT W IV CONTRAST    - Impression -  Cellulitis without CT evidence of abscess, deep compartment infection  or acute osteomyelitis.      MACRO:  None    Signed by: Leeanna French 2/8/2024 12:35 PM  Dictation workstation:   [id number]  === 02/28/24 ===    XR TIBIA FIBULA 2 VIEWS LEFT    - Impression -  Soft tissue swelling, with no specific findings of osteomyelitis.      MACRO:  None    Signed by: Layne Maravilla 2/28/2024 7:27 PM  Dictation workstation:   [id number]  === 02/17/16 ===    MR 3D POST PROCESSING W REPORT 2ND WORKSTATION    Additional results of the last 24 hours have been reviewed.    Dictated using Nextt Version 2.4  Proof read " however unrecognized voice recognition errors may have occurred     Electronically signed by Adolfo Martinez DO on 03/03/24 at 5:33 PM

## 2024-03-03 NOTE — PROGRESS NOTES
Claudia Coffey is a 68 y.o. female on day 3 of admission presenting with Failure of outpatient treatment.    Subjective   Interval History: no fever, less diarrhea        Review of Systems    Objective   Range of Vitals (last 24 hours)  Heart Rate:  [65-80]   Temp:  [36 °C (96.8 °F)]   Resp:  [16-18]   BP: (117-141)/(61-78)   SpO2:  [95 %]   Daily Weight  02/28/24 : 86.2 kg (190 lb)    Body mass index is 30.68 kg/m².    Physical Exam  Constitutional:       Appearance: Normal appearance.   HENT:      Head: Normocephalic and atraumatic.      Mouth/Throat:      Mouth: Mucous membranes are moist.      Pharynx: Oropharynx is clear.   Eyes:      Pupils: Pupils are equal, round, and reactive to light.   Cardiovascular:      Rate and Rhythm: Normal rate and regular rhythm.      Heart sounds: Normal heart sounds.   Pulmonary:      Effort: Pulmonary effort is normal.      Breath sounds: Normal breath sounds.   Abdominal:      General: Abdomen is flat. Bowel sounds are normal.      Palpations: Abdomen is soft.   Musculoskeletal:      Cervical back: Normal range of motion.      Comments: Less redness in the Lt leg   Neurological:      Mental Status: She is alert.         Antibiotics  sodium chloride 0.9 % bolus 1,000 mL  vancomycin (Vancocin) placeholder  piperacillin-tazobactam-dextrose (Zosyn) IV 3.375 g  vancomycin (Vancocin) in dextrose 5 % water (D5W) 250 mL IV 1,250 mg  vancomycin (Vancocin) in dextrose 5% 250 mL IV 1,250 mg  acetaminophen (Tylenol) tablet 650 mg  multivitamin with minerals 1 tablet  fluticasone (Flonase) nasal spray 1 spray  rosuvastatin (Crestor) tablet 5 mg  albuterol 90 mcg/actuation inhaler 2 puff  fluticasone furoate-vilanteroL (Breo Ellipta) 100-25 mcg/dose inhaler 1 puff  montelukast (Singulair) tablet 10 mg  polyethylene glycol (Glycolax, Miralax) packet 17 g  enoxaparin (Lovenox) syringe 40 mg  ceFAZolin in dextrose (iso-os) (Ancef) IVPB 2 g  acetaminophen (Tylenol) tablet 975 mg  gabapentin  (Neurontin) capsule 100 mg  oxyCODONE (Roxicodone) immediate release tablet 2.5 mg  oxyCODONE (Roxicodone) immediate release tablet 5 mg  polyethylene glycol (Glycolax, Miralax) packet 17 g  melatonin tablet 5 mg  vancomycin (Vancocin) capsule 125 mg      Relevant Results  Labs  Results from last 72 hours   Lab Units 03/03/24  0740 03/02/24  0635 03/01/24  0756   WBC AUTO x10*3/uL 6.7 5.7 9.4   HEMOGLOBIN g/dL 12.2 12.0 12.4   HEMATOCRIT % 38.2 37.7 39.1   PLATELETS AUTO x10*3/uL 317 292 286       Results from last 72 hours   Lab Units 03/03/24  0740 03/02/24  0635 03/01/24  0756   SODIUM mmol/L 140 140 139   POTASSIUM mmol/L 3.6 3.5 3.7   CHLORIDE mmol/L 105 105 103   CO2 mmol/L 26 27 28   BUN mg/dL 7 6 6   CREATININE mg/dL 0.54 0.59 0.58   GLUCOSE mg/dL 102* 103* 122*   CALCIUM mg/dL 8.1* 8.6 8.7   ANION GAP mmol/L 13 12 12   EGFR mL/min/1.73m*2 >90 >90 >90   PHOSPHORUS mg/dL 3.0 3.5 2.4*       Results from last 72 hours   Lab Units 03/03/24  0740 03/02/24  0635 03/01/24  0756   ALBUMIN g/dL 3.7 3.7 3.8       Estimated Creatinine Clearance: 113.4 mL/min (by C-G formula based on SCr of 0.54 mg/dL).  C-Reactive Protein   Date Value Ref Range Status   03/03/2024 1.66 (H) <1.00 mg/dL Final   03/02/2024 4.29 (H) <1.00 mg/dL Final   03/01/2024 9.73 (H) <1.00 mg/dL Final     Microbiology  Reviewed  Imaging  reviewed        Assessment/Plan   Left leg stasis /cellulitis  Leukocytosis, resolved  C. Diff colitis     Recommendations :  Continue Cefazolin and Oral Vancomycin      I spent minutes in the professional and overall care of this patient.      Rashi Hinkle MD   [Fever] : no fever [Chills] : no chills [Fatigue] : no fatigue [Recent Change In Weight] : ~T no recent weight change [Chest Pain] : no chest pain [Palpitations] : no palpitations [Shortness Of Breath] : no shortness of breath [Cough] : no cough [Dyspnea on Exertion] : not dyspnea on exertion [Abdominal Pain] : no abdominal pain [Nausea] : no nausea [Vomiting] : no vomiting [Joint Pain] : no joint pain [Back Pain] : no back pain [Headache] : no headache [Dizziness] : no dizziness [Anxiety] : no anxiety [Depression] : no depression

## 2024-03-04 ENCOUNTER — PHARMACY VISIT (OUTPATIENT)
Dept: PHARMACY | Facility: CLINIC | Age: 69
End: 2024-03-04
Payer: COMMERCIAL

## 2024-03-04 VITALS
WEIGHT: 190 LBS | RESPIRATION RATE: 18 BRPM | HEIGHT: 66 IN | TEMPERATURE: 97.5 F | BODY MASS INDEX: 30.53 KG/M2 | OXYGEN SATURATION: 95 % | DIASTOLIC BLOOD PRESSURE: 74 MMHG | SYSTOLIC BLOOD PRESSURE: 134 MMHG | HEART RATE: 68 BPM

## 2024-03-04 LAB
ALBUMIN SERPL BCP-MCNC: 3.8 G/DL (ref 3.4–5)
ANION GAP SERPL CALC-SCNC: 13 MMOL/L (ref 10–20)
BACTERIA BLD CULT: NORMAL
BACTERIA BLD CULT: NORMAL
BUN SERPL-MCNC: 8 MG/DL (ref 6–23)
CALCIUM SERPL-MCNC: 8.3 MG/DL (ref 8.6–10.3)
CHLORIDE SERPL-SCNC: 105 MMOL/L (ref 98–107)
CO2 SERPL-SCNC: 26 MMOL/L (ref 21–32)
CREAT SERPL-MCNC: 0.51 MG/DL (ref 0.5–1.05)
EGFRCR SERPLBLD CKD-EPI 2021: >90 ML/MIN/1.73M*2
ERYTHROCYTE [DISTWIDTH] IN BLOOD BY AUTOMATED COUNT: 13.6 % (ref 11.5–14.5)
GLUCOSE SERPL-MCNC: 98 MG/DL (ref 74–99)
HCT VFR BLD AUTO: 40.1 % (ref 36–46)
HGB BLD-MCNC: 12.4 G/DL (ref 12–16)
MCH RBC QN AUTO: 27.8 PG (ref 26–34)
MCHC RBC AUTO-ENTMCNC: 30.9 G/DL (ref 32–36)
MCV RBC AUTO: 90 FL (ref 80–100)
NRBC BLD-RTO: 0 /100 WBCS (ref 0–0)
PHOSPHATE SERPL-MCNC: 2.7 MG/DL (ref 2.5–4.9)
PLATELET # BLD AUTO: 303 X10*3/UL (ref 150–450)
POTASSIUM SERPL-SCNC: 4 MMOL/L (ref 3.5–5.3)
RBC # BLD AUTO: 4.46 X10*6/UL (ref 4–5.2)
SODIUM SERPL-SCNC: 140 MMOL/L (ref 136–145)
WBC # BLD AUTO: 6.6 X10*3/UL (ref 4.4–11.3)

## 2024-03-04 PROCEDURE — 36415 COLL VENOUS BLD VENIPUNCTURE: CPT | Performed by: INTERNAL MEDICINE

## 2024-03-04 PROCEDURE — 2500000001 HC RX 250 WO HCPCS SELF ADMINISTERED DRUGS (ALT 637 FOR MEDICARE OP): Performed by: INTERNAL MEDICINE

## 2024-03-04 PROCEDURE — 99239 HOSP IP/OBS DSCHRG MGMT >30: CPT | Performed by: INTERNAL MEDICINE

## 2024-03-04 PROCEDURE — 2500000002 HC RX 250 W HCPCS SELF ADMINISTERED DRUGS (ALT 637 FOR MEDICARE OP, ALT 636 FOR OP/ED): Performed by: INTERNAL MEDICINE

## 2024-03-04 PROCEDURE — 2500000004 HC RX 250 GENERAL PHARMACY W/ HCPCS (ALT 636 FOR OP/ED): Performed by: INTERNAL MEDICINE

## 2024-03-04 PROCEDURE — 85027 COMPLETE CBC AUTOMATED: CPT | Performed by: INTERNAL MEDICINE

## 2024-03-04 PROCEDURE — RXMED WILLOW AMBULATORY MEDICATION CHARGE

## 2024-03-04 PROCEDURE — 80069 RENAL FUNCTION PANEL: CPT | Performed by: INTERNAL MEDICINE

## 2024-03-04 RX ORDER — GABAPENTIN 100 MG/1
100 CAPSULE ORAL 2 TIMES DAILY
Qty: 60 CAPSULE | Refills: 0 | Status: SHIPPED | OUTPATIENT
Start: 2024-03-04 | End: 2024-04-03

## 2024-03-04 RX ORDER — VANCOMYCIN HYDROCHLORIDE 125 MG/1
125 CAPSULE ORAL 4 TIMES DAILY
Qty: 56 CAPSULE | Refills: 0 | Status: SHIPPED | OUTPATIENT
Start: 2024-03-04 | End: 2024-03-18

## 2024-03-04 RX ORDER — ACETAMINOPHEN 500 MG
1000 TABLET ORAL EVERY 8 HOURS PRN
Qty: 120 TABLET | Refills: 0 | Status: SHIPPED | OUTPATIENT
Start: 2024-03-04

## 2024-03-04 RX ORDER — CEPHALEXIN 500 MG/1
500 CAPSULE ORAL 2 TIMES DAILY
Qty: 6 CAPSULE | Refills: 0 | Status: SHIPPED | OUTPATIENT
Start: 2024-03-04 | End: 2024-03-07

## 2024-03-04 RX ADMIN — FLUTICASONE PROPIONATE 1 SPRAY: 50 SPRAY, METERED NASAL at 10:11

## 2024-03-04 RX ADMIN — ACETAMINOPHEN 975 MG: 325 TABLET ORAL at 07:20

## 2024-03-04 RX ADMIN — ROSUVASTATIN CALCIUM 5 MG: 10 TABLET, FILM COATED ORAL at 10:04

## 2024-03-04 RX ADMIN — CEFAZOLIN SODIUM 2 G: 2 INJECTION, SOLUTION INTRAVENOUS at 10:06

## 2024-03-04 RX ADMIN — Medication 1 TABLET: at 10:05

## 2024-03-04 RX ADMIN — CEFAZOLIN SODIUM 2 G: 2 INJECTION, SOLUTION INTRAVENOUS at 00:20

## 2024-03-04 RX ADMIN — GABAPENTIN 100 MG: 100 CAPSULE ORAL at 10:04

## 2024-03-04 RX ADMIN — ENOXAPARIN SODIUM 40 MG: 40 INJECTION SUBCUTANEOUS at 10:06

## 2024-03-04 RX ADMIN — VANCOMYCIN HYDROCHLORIDE 125 MG: 125 CAPSULE ORAL at 06:24

## 2024-03-04 ASSESSMENT — PAIN - FUNCTIONAL ASSESSMENT: PAIN_FUNCTIONAL_ASSESSMENT: 0-10

## 2024-03-04 ASSESSMENT — PAIN SCALES - GENERAL: PAINLEVEL_OUTOF10: 0 - NO PAIN

## 2024-03-04 NOTE — DISCHARGE SUMMARY
[facility] Hospitalist  Discharge Summary with Discharge Day Progress Note and Transition Note                 Claudia Coffey                  : 1955                      MRN:  [id number]     ADMIT DATE: 2024            DISCHARGE DATE:  3/4/2024     PRIMARYCARE PHYSICIAN:  Solitario Cole DO     VISIT STATUS: Admission     CODE STATUS:  Full Code     DISCHARGE DIAGNOSES:    Principal Problem:    Failure of outpatient treatment       HOSPITAL COURSE:  Claudia Coffey is a 68 y.o. female with PMH cervical disc disorder, fatty liver, osteopenia, pancreatitis, COPD, former smoker. Patient was initially admitted (24-24) LLE cellulitis w/o abscess due to penetrating trauma w/o foreign body confirmed with CT scan.  She had a follow-up with her primary care physician on the .  At which time levofloxacin was started.  She continued to have intermittent fever and chills while on the antibiotics.  She also reports nausea, anorexia and loose stool and abdominal cramping.  She came back to the hospital today because she continued to have fever and chills with persistent LLE erythema, warmth and tenderness.  Patient was admitted for recurrent LLE cellulitis with failed outpatient therapy.     # Recurrent Left lower extremity cellulitis  # C diff diarrhea  # Failed outpatient therapy  # COPD w/o exacerbation on bronchodilators  # Former smoker  -on cefazolin->Keflex  -on PO vanc  -LE US w/o e/o DVT     DAY OF DISCHARGE:  Review of Systems   All other systems reviewed and are negative.       Patient Vitals for the past 24 hrs:   BP Temp Temp src Pulse Resp SpO2   24 0814 134/74 36.4 °C (97.5 °F) Temporal 68 18 95 %   24 0530 137/80 36.4 °C (97.5 °F) Temporal 78 18 94 %   24 0032 115/54 36.4 °C (97.5 °F) Temporal 61 18 95 %   24 2056 117/52 36.2 °C (97.2 °F) Temporal 66 18 95 %   24 1344 103/53 36 °C (96.8 °F) Temporal 76 16 96 %        Average, Min, and Max forlast 24 hours  Vitals:  TEMPERATURE:  Temp  Av.3 °C (97.3 °F)  Min: 36 °C (96.8 °F)  Max: 36.4 °C (97.5 °F)     RESPIRATIONS RANGE: Resp  Av.6  Min: 16  Max: 18     PULSE RANGE: Pulse  Av.8  Min: 61  Max: 78     BLOOD PRESSURE RANGE:  Systolic (24hrs), Av , Min:103 , Max:137   ; Diastolic (24hrs), Av, Min:52, Max:80       PULSE OXIMETRYRANGE: SpO2  Av %  Min: 94 %  Max: 96 %     I/O last 3 completed shifts:  In: 280 (3.2 mL/kg) [P.O.:180; IV Piggyback:100]  Out: - (0 mL/kg)   Weight: 86.2 kg      Physical Exam  Vitals and nursing note reviewed.   Constitutional:       Appearance: Normal appearance.   HENT:      Head: Normocephalic and atraumatic.      Right Ear: External ear normal.      Left Ear: External ear normal.      Nose: Nose normal.      Mouth/Throat:      Mouth: Mucous membranes are moist.   Eyes:      General: No scleral icterus.        Right eye: No discharge.         Left eye: No discharge.      Extraocular Movements: Extraocular movements intact.      Conjunctiva/sclera: Conjunctivae normal.      Pupils: Pupils are equal, round, and reactive to light.   Cardiovascular:      Rate and Rhythm: Normal rate and regular rhythm.   Pulmonary:      Effort: Pulmonary effort is normal.      Breath sounds: Normal breath sounds.   Abdominal:      General: Abdomen is flat. Bowel sounds are normal.      Palpations: Abdomen is soft.   Musculoskeletal:         General: Normal range of motion.      Right lower leg: No swelling or tenderness. No edema.      Left lower leg: Tenderness (improving erythema, warm to touch) present. No swelling. No edema.   Skin:     General: Skin is warm and dry.      Capillary Refill: Capillary refill takes less than 2 seconds.      Findings: Erythema (improving) present. No abrasion or abscess.   Neurological:      General: No focal deficit present.   Psychiatric:         Mood and Affect: Mood normal.         Thought Content: Thought content normal.         Judgment: Judgment  normal.          PROCEDURES:    CONSULTANTS:  ID     DISCHARGE MEDICATIONS:     Significant Medication Changes:         Your medication list        START taking these medications        Instructions Last Dose Given Next Dose Due   cephalexin 500 mg capsule  Commonly known as: Keflex      Take 1 capsule (500 mg) by mouth 2 times a day for 3 days.       gabapentin 100 mg capsule  Commonly known as: Neurontin      Take 1 capsule (100 mg) by mouth 2 times a day.       vancomycin 125 mg capsule  Commonly known as: Vancocin      Take 1 capsule (125 mg) by mouth 4 times a day for 14 days.              CHANGE how you take these medications        Instructions Last Dose Given Next Dose Due   acetaminophen 500 mg tablet  Commonly known as: Tylenol  What changed:   medication strength  how much to take  when to take this      Take 2 tablets (1,000 mg) by mouth every 8 hours if needed for mild pain (1 - 3).       rosuvastatin 5 mg tablet  Commonly known as: Crestor  What changed: when to take this      Take 1 tablet (5 mg) by mouth once daily.              CONTINUE taking these medications        Instructions Last Dose Given Next Dose Due   albuterol 90 mcg/actuation inhaler      inhale 1 to 2 puffs by mouth and INTO THE LUNGS every 4 to 6 hours if needed       budesonide-formoteroL 160-4.5 mcg/actuation inhaler  Commonly known as: Symbicort      Inhale 2 puffs 2 times a day. Rinse mouth with water after use to reduce aftertaste and incidence of candidiasis. Do not swallow.       fluticasone 50 mcg/actuation nasal spray  Commonly known as: Flonase           multivitamin with minerals tablet                  STOP taking these medications      levoFLOXacin 500 mg tablet  Commonly known as: Levaquin               ASK your doctor about these medications        Instructions Last Dose Given Next Dose Due   montelukast 10 mg tablet  Commonly known as: Singulair      Take 1 tablet (10 mg) by mouth once daily at bedtime.                  Where to Get Your Medications        These medications were sent to [facility]  [address], [place] [address]      Hours: 9 AM to 5 PM Mon-Fri Phone: [phone]   acetaminophen 500 mg tablet  cephalexin 500 mg capsule  gabapentin 100 mg capsule  vancomycin 125 mg capsule            DIET: regular      COMPLEXITY OF FOLLOW UP:  [x] Moderate Complexity: follow up within 7-14 calendar days (56548)  []Severe Complexity: follow up within 7 calendar days (36101)     FOLLOW UP TESTING, PENDING RESULTS ORREFERRALS AT TRANSITIONAL CARE VISIT:   []Yes   [] No     DISPOSITION:  Home     Follow up with Solitario Cole DO  .       DISCHARGE TIME: > 30 minutes    Electronically signed by Adolfo Martinez DO on 03/04/24 at 9:31 AM    Dictated using [employer] Version 2.4  Proof read however unrecognized voice recognition errors may have occurred

## 2024-03-04 NOTE — CARE PLAN
The patient's goals for the shift include  to get discharged    The clinical goals for the shift include pt will continue to have decreased loose stools

## 2024-03-04 NOTE — PROGRESS NOTES
03/04/24 1044   Discharge Planning   Living Arrangements Spouse/significant other   Support Systems Spouse/significant other   Assistance Needed A&Ox3, independent with ADLs, no DME, room air at baseline, drives   Type of Residence Private residence   Number of Stairs to Enter Residence 0   Number of Stairs Within Residence 0   Do you have animals or pets at home? Yes   Type of Animals or Pets 1 cat, Bigg   Home or Post Acute Services None   Patient expects to be discharged to: Home no needs

## 2024-03-04 NOTE — PROGRESS NOTES
Claudia Coffey is a 68 y.o. female on day 4 of admission presenting with Failure of outpatient treatment.    Subjective   Interval History: no fever, less diarrhea        Review of Systems    Objective   Range of Vitals (last 24 hours)  Heart Rate:  [61-78]   Temp:  [36 °C (96.8 °F)-36.4 °C (97.5 °F)]   Resp:  [16-18]   BP: (103-137)/(52-80)   SpO2:  [94 %-96 %]   Daily Weight  02/28/24 : 86.2 kg (190 lb)    Body mass index is 30.68 kg/m².    Physical Exam  Constitutional:       Appearance: Normal appearance.   HENT:      Head: Normocephalic and atraumatic.      Mouth/Throat:      Mouth: Mucous membranes are moist.      Pharynx: Oropharynx is clear.   Eyes:      Pupils: Pupils are equal, round, and reactive to light.   Cardiovascular:      Rate and Rhythm: Normal rate and regular rhythm.      Heart sounds: Normal heart sounds.   Pulmonary:      Effort: Pulmonary effort is normal.      Breath sounds: Normal breath sounds.   Abdominal:      General: Abdomen is flat. Bowel sounds are normal.      Palpations: Abdomen is soft.   Musculoskeletal:      Cervical back: Normal range of motion.      Comments: Less redness in the Lt leg   Neurological:      Mental Status: She is alert.         Antibiotics  sodium chloride 0.9 % bolus 1,000 mL  vancomycin (Vancocin) placeholder  piperacillin-tazobactam-dextrose (Zosyn) IV 3.375 g  vancomycin (Vancocin) in dextrose 5 % water (D5W) 250 mL IV 1,250 mg  vancomycin (Vancocin) in dextrose 5% 250 mL IV 1,250 mg  acetaminophen (Tylenol) tablet 650 mg  multivitamin with minerals 1 tablet  fluticasone (Flonase) nasal spray 1 spray  rosuvastatin (Crestor) tablet 5 mg  albuterol 90 mcg/actuation inhaler 2 puff  fluticasone furoate-vilanteroL (Breo Ellipta) 100-25 mcg/dose inhaler 1 puff  montelukast (Singulair) tablet 10 mg  polyethylene glycol (Glycolax, Miralax) packet 17 g  enoxaparin (Lovenox) syringe 40 mg  ceFAZolin in dextrose (iso-os) (Ancef) IVPB 2 g  acetaminophen (Tylenol) tablet  975 mg  gabapentin (Neurontin) capsule 100 mg  oxyCODONE (Roxicodone) immediate release tablet 2.5 mg  oxyCODONE (Roxicodone) immediate release tablet 5 mg  polyethylene glycol (Glycolax, Miralax) packet 17 g  melatonin tablet 5 mg  vancomycin (Vancocin) capsule 125 mg      Relevant Results  Labs  Results from last 72 hours   Lab Units 03/04/24  0652 03/03/24  0740 03/02/24  0635   WBC AUTO x10*3/uL 6.6 6.7 5.7   HEMOGLOBIN g/dL 12.4 12.2 12.0   HEMATOCRIT % 40.1 38.2 37.7   PLATELETS AUTO x10*3/uL 303 317 292       Results from last 72 hours   Lab Units 03/04/24  0652 03/03/24  0740 03/02/24  0635   SODIUM mmol/L 140 140 140   POTASSIUM mmol/L 4.0 3.6 3.5   CHLORIDE mmol/L 105 105 105   CO2 mmol/L 26 26 27   BUN mg/dL 8 7 6   CREATININE mg/dL 0.51 0.54 0.59   GLUCOSE mg/dL 98 102* 103*   CALCIUM mg/dL 8.3* 8.1* 8.6   ANION GAP mmol/L 13 13 12   EGFR mL/min/1.73m*2 >90 >90 >90   PHOSPHORUS mg/dL 2.7 3.0 3.5       Results from last 72 hours   Lab Units 03/04/24  0652 03/03/24  0740 03/02/24  0635   ALBUMIN g/dL 3.8 3.7 3.7       Estimated Creatinine Clearance: 120.1 mL/min (by C-G formula based on SCr of 0.51 mg/dL).  C-Reactive Protein   Date Value Ref Range Status   03/03/2024 1.66 (H) <1.00 mg/dL Final   03/02/2024 4.29 (H) <1.00 mg/dL Final   03/01/2024 9.73 (H) <1.00 mg/dL Final     Microbiology  Reviewed  Imaging  reviewed        Assessment/Plan   Left leg stasis /cellulitis  Leukocytosis, resolved  C. Diff colitis     Recommendations :  Plan on oral Keflex x 3 days and Oral Vancomycin x 2 weeks with discharge  Advised to elevate the lleg and use compression dressing at home  Discussed with the medical team     I spent minutes in the professional and overall care of this patient.      Rashi Hinkle MD

## 2024-03-05 ENCOUNTER — PATIENT OUTREACH (OUTPATIENT)
Dept: PRIMARY CARE | Facility: CLINIC | Age: 69
End: 2024-03-05
Payer: COMMERCIAL

## 2024-03-05 NOTE — PROGRESS NOTES
Discharge Facility: [facility]   Discharge Diagnosis: Failure of outpatient treatment   Admission Date: 2-28-24   Discharge Date: 3-4-24      PCP Appointment Date: Pt. Declined new appt. At this time.   Specialist Appointment Date: n/a  Hospital Encounter and Summary: Linked   See discharge assessment below for further details  Engagement  Call Start Time: 1257 (3/5/2024 12:59 PM)    Medications  Medications reviewed with patient/caregiver?: Yes (3/5/2024 12:59 PM)  Is the patient having any side effects they believe may be caused by any medication additions or changes?: No (3/5/2024 12:59 PM)  Does the patient have all medications ordered at discharge?: Yes (3/5/2024 12:59 PM)  Care Management Interventions: No intervention needed (3/5/2024 12:59 PM)  Prescription Comments: START taking:  cephalexin (Keflex)   gabapentin (Neurontin)   vancomycin (Vancocin)    CHANGE how you take:  acetaminophen (Tylenol)    STOP taking:  levoFLOXacin 500 mg tablet (Levaquin)    ASK how to take:  montelukast 10 mg tablet (Singulair) (3/5/2024 12:59 PM)  Is the patient taking all medications as directed (includes completed medication regime)?: Yes (3/5/2024 12:59 PM)  Care Management Interventions: Provided patient education (3/5/2024 12:59 PM)    Self Management  Has home health visited the patient within 72 hours of discharge?: Not applicable (3/5/2024 12:59 PM)    Patient Teaching  Does the patient have access to their discharge instructions?: Yes (3/5/2024 12:59 PM)  Care Management Interventions: Reviewed instructions with patient (3/5/2024 12:59 PM)  What is the patient's perception of their health status since discharge?: Improving (3/5/2024 12:59 PM)  Is the patient/caregiver able to teach back the hierarchy of who to call/visit for symptoms/problems? PCP, Specialist, Home Health nurse, Urgent Care, ED, 911: Yes (3/5/2024 12:59 PM)      Deb Baldwin LPN

## 2024-03-06 NOTE — SIGNIFICANT EVENT
Follow Up Phone Call    Outgoing phone call    Spoke to: Claudia Coffey Relationship:self   Phone number: [phone]      Outcome: contacted patient/ family   Chief Complaint   Patient presents with    Leg Pain          Diagnosis:Not applicable      States she is feeling better. Instructed her to schedule her follow up appointment at the end of her Vancomycin course.  Voiced understanding. No further questions or concerns.

## 2024-03-18 ENCOUNTER — PATIENT OUTREACH (OUTPATIENT)
Dept: CARE COORDINATION | Facility: CLINIC | Age: 69
End: 2024-03-18
Payer: COMMERCIAL

## 2024-03-18 NOTE — PROGRESS NOTES
Unable to reach patient for call back after patient's follow up appointment with PCP.   LVM with call back number for patient to call if needed   If no voicemail available call attempts x 2 were made to contact the patient to assist with any questions or concerns patient may have.    Deb Baldwin LPN

## 2024-03-20 ENCOUNTER — OFFICE VISIT (OUTPATIENT)
Dept: PRIMARY CARE | Facility: CLINIC | Age: 69
End: 2024-03-20
Payer: COMMERCIAL

## 2024-03-20 VITALS
BODY MASS INDEX: 31.02 KG/M2 | DIASTOLIC BLOOD PRESSURE: 68 MMHG | OXYGEN SATURATION: 96 % | TEMPERATURE: 97.3 F | HEIGHT: 66 IN | HEART RATE: 83 BPM | SYSTOLIC BLOOD PRESSURE: 110 MMHG | WEIGHT: 193 LBS

## 2024-03-20 DIAGNOSIS — L03.116 CELLULITIS OF LEFT LOWER EXTREMITY: ICD-10-CM

## 2024-03-20 DIAGNOSIS — A04.72 CLOSTRIDIOIDES DIFFICILE DIARRHEA: Primary | ICD-10-CM

## 2024-03-20 PROCEDURE — 1111F DSCHRG MED/CURRENT MED MERGE: CPT | Performed by: FAMILY MEDICINE

## 2024-03-20 PROCEDURE — 1159F MED LIST DOCD IN RCRD: CPT | Performed by: FAMILY MEDICINE

## 2024-03-20 PROCEDURE — 1036F TOBACCO NON-USER: CPT | Performed by: FAMILY MEDICINE

## 2024-03-20 PROCEDURE — 99495 TRANSJ CARE MGMT MOD F2F 14D: CPT | Performed by: FAMILY MEDICINE

## 2024-03-20 PROCEDURE — 1160F RVW MEDS BY RX/DR IN RCRD: CPT | Performed by: FAMILY MEDICINE

## 2024-03-20 RX ORDER — ACETAMINOPHEN 500 MG
5 TABLET ORAL
COMMUNITY
Start: 2024-02-29

## 2024-03-20 RX ORDER — TIOTROPIUM BROMIDE 18 UG/1
CAPSULE ORAL; RESPIRATORY (INHALATION)
COMMUNITY
Start: 2023-08-01 | End: 2024-07-31

## 2024-03-20 RX ORDER — POLYETHYLENE GLYCOL 3350 17 G/17G
17 POWDER, FOR SOLUTION ORAL DAILY PRN
COMMUNITY
Start: 2024-02-29

## 2024-03-20 NOTE — PROGRESS NOTES
Subjective   Patient ID: Claudia Coffey is a 68 y.o. female who presents for Hospital Follow-up (C diff, and cellulitis finished antibiotic Monday morning ).  HPI  Follow up on cellulitis left leg, and C diff.   Started solid stools 2 weeks ago. Getting back to normal.  Using probiotics. Leg is decreased in redness and swelling. Itchy but Mild pain but no calor.   Taking gabapentin and helps with pain.  She is taking 100 mg 2 x day.  She is looking to stop the medication.  Tylenol seems to help as well.     Discharge Facility: [facility] Diagnosis: Failure of outpatient treatment   Admission Date: 2-28-24   Discharge Date: 3-4-24      PCP Appointment Date: Pt. Declined new appt. At this time.   Specialist Appointment Date: n/a  Hospital Encounter and Summary: Linked   See discharge assessment below for further details  Engagement  Call Start Time: 1257 (3/5/2024 12:59 PM)     Medications  Medications reviewed with patient/caregiver?: Yes (3/5/2024 12:59 PM)  Is the patient having any side effects they believe may be caused by any medication additions or changes?: No (3/5/2024 12:59 PM)  Does the patient have all medications ordered at discharge?: Yes (3/5/2024 12:59 PM)  Care Management Interventions: No intervention needed (3/5/2024 12:59 PM)  Prescription Comments: START taking:  cephalexin (Keflex)   gabapentin (Neurontin)   vancomycin (Vancocin)    CHANGE how you take:  acetaminophen (Tylenol)    STOP taking:  levoFLOXacin 500 mg tablet (Levaquin)    ASK how to take:  montelukast 10 mg tablet (Singulair) (3/5/2024 12:59 PM)  Is the patient taking all medications as directed (includes completed medication regime)?: Yes (3/5/2024 12:59 PM)  Care Management Interventions: Provided patient education (3/5/2024 12:59 PM)     Self Management  Has home health visited the patient within 72 hours of discharge?: Not applicable (3/5/2024 12:59 PM)     Patient Teaching  Does the patient have access to their discharge  instructions?: Yes (3/5/2024 12:59 PM)  Care Management Interventions: Reviewed instructions with patient (3/5/2024 12:59 PM)  What is the patient's perception of their health status since discharge?: Improving (3/5/2024 12:59 PM)    Review of Systems    Objective   Physical Exam  General: Patient is alert and oriented ×3 and appears in no acute distress. No respiratory distress.    Head: Atraumatic normocephalic.    Eyes: EOMI, PERRLA      Neck: Normal range of motion, no masses.  Thyroid is palpable and normal in size without any nodules. No anterior cervical or posterior cervical adenopathy.    Heart: Regular rate and rhythm, no murmurs clicks or gallops    Lungs: Clear to auscultation bilaterally without any rhonchi rales or wheezing, lung sounds heard throughout all lung fields    Musculoskeletal: Normal range of motion, strength is grossly intact i    Skin: Left lower extremity is not swollen, erythema has resolved but there is a dusky color, pulses are good plus 2 out of 4, negative Homans    Psych: Normal affect.  Assessment/Plan   Problem List Items Addressed This Visit       Cellulitis of left lower extremity    Clostridioides difficile diarrhea - Primary   Lower extremity cellulitis  - The patient's leg is improved  - Wear compression stockings  - Can place colloidal silver onto the lower extremity  - Call if there are any symptoms of the cellulitis returning    C. difficile  - Patient is improving and having normal stools now  - Continue probiotics indefinitely  - ED cultured foods  - Call if symptoms return

## 2024-04-04 ENCOUNTER — PATIENT OUTREACH (OUTPATIENT)
Dept: CARE COORDINATION | Facility: CLINIC | Age: 69
End: 2024-04-04
Payer: COMMERCIAL

## 2024-04-04 NOTE — PROGRESS NOTES
Call placed regarding one month post discharge follow up call.  At time of outreach call the patient feels as if their condition has (improved) since initial visit with PCP or specialist.  Questions or concerns regarding recovery period addressed at this time.   Reviewed any PCP or specialists progress notes/labs/radiology reports if applicable and addressed any questions or concerns.    Pt. States no questions/concerns at this time.     Deb Baldwin LPN

## 2024-05-28 DIAGNOSIS — J43.9 PULMONARY EMPHYSEMA, UNSPECIFIED EMPHYSEMA TYPE (MULTI): ICD-10-CM

## 2024-05-28 DIAGNOSIS — E78.2 MIXED HYPERLIPIDEMIA: ICD-10-CM

## 2024-05-28 RX ORDER — BUDESONIDE AND FORMOTEROL FUMARATE DIHYDRATE 160; 4.5 UG/1; UG/1
2 AEROSOL RESPIRATORY (INHALATION)
Qty: 6 G | Refills: 11 | Status: SHIPPED | OUTPATIENT
Start: 2024-05-28 | End: 2025-05-28

## 2024-05-28 RX ORDER — ROSUVASTATIN CALCIUM 5 MG/1
5 TABLET, COATED ORAL NIGHTLY
Qty: 90 TABLET | Refills: 3 | Status: SHIPPED | OUTPATIENT
Start: 2024-05-28 | End: 2025-05-28

## 2024-06-03 ENCOUNTER — PATIENT OUTREACH (OUTPATIENT)
Dept: CARE COORDINATION | Facility: CLINIC | Age: 69
End: 2024-06-03
Payer: COMMERCIAL

## 2024-06-03 NOTE — PROGRESS NOTES
Call placed regarding 90 days post discharge follow up call.  At time of outreach call the patient feels as if their condition has returned to baseline since initial visit with PCP or specialist.  Questions or concerns regarding recovery period addressed at this time.  Reviewed any PCP or specialists progress notes/labs/radiology reports if applicable and addressed any questions or concerns.     Pt. States she is all back to normal.     Deb Baldwin LPN

## 2024-07-09 ENCOUNTER — HOSPITAL ENCOUNTER (OUTPATIENT)
Dept: RADIOLOGY | Facility: HOSPITAL | Age: 69
Discharge: HOME | End: 2024-07-09
Payer: COMMERCIAL

## 2024-07-09 DIAGNOSIS — R92.8 ABNORMAL MAMMOGRAM: ICD-10-CM

## 2024-07-29 ENCOUNTER — LAB (OUTPATIENT)
Dept: LAB | Facility: LAB | Age: 69
End: 2024-07-29
Payer: COMMERCIAL

## 2024-07-29 DIAGNOSIS — M85.89 OSTEOPENIA OF MULTIPLE SITES: ICD-10-CM

## 2024-07-29 DIAGNOSIS — R73.03 PREDIABETES: ICD-10-CM

## 2024-07-29 DIAGNOSIS — E78.2 MIXED HYPERLIPIDEMIA: ICD-10-CM

## 2024-07-29 DIAGNOSIS — R25.2 LEG CRAMPS: ICD-10-CM

## 2024-07-29 LAB
ALBUMIN SERPL BCP-MCNC: 4.5 G/DL (ref 3.4–5)
ALP SERPL-CCNC: 68 U/L (ref 33–136)
ALT SERPL W P-5'-P-CCNC: 75 U/L (ref 7–45)
ANION GAP SERPL CALC-SCNC: 16 MMOL/L (ref 10–20)
AST SERPL W P-5'-P-CCNC: 38 U/L (ref 9–39)
BASOPHILS # BLD AUTO: 0.02 X10*3/UL (ref 0–0.1)
BASOPHILS NFR BLD AUTO: 0.3 %
BILIRUB SERPL-MCNC: 0.5 MG/DL (ref 0–1.2)
BUN SERPL-MCNC: 13 MG/DL (ref 6–23)
CALCIUM SERPL-MCNC: 9.8 MG/DL (ref 8.6–10.3)
CHLORIDE SERPL-SCNC: 102 MMOL/L (ref 98–107)
CHOLEST SERPL-MCNC: 169 MG/DL (ref 0–199)
CHOLESTEROL/HDL RATIO: 3.2
CO2 SERPL-SCNC: 24 MMOL/L (ref 21–32)
CREAT SERPL-MCNC: 0.67 MG/DL (ref 0.5–1.05)
EGFRCR SERPLBLD CKD-EPI 2021: >90 ML/MIN/1.73M*2
EOSINOPHIL # BLD AUTO: 0.17 X10*3/UL (ref 0–0.7)
EOSINOPHIL NFR BLD AUTO: 2.3 %
ERYTHROCYTE [DISTWIDTH] IN BLOOD BY AUTOMATED COUNT: 14 % (ref 11.5–14.5)
EST. AVERAGE GLUCOSE BLD GHB EST-MCNC: 134 MG/DL
GLUCOSE SERPL-MCNC: 111 MG/DL (ref 74–99)
HBA1C MFR BLD: 6.3 %
HCT VFR BLD AUTO: 43.3 % (ref 36–46)
HDLC SERPL-MCNC: 52.1 MG/DL
HGB BLD-MCNC: 14 G/DL (ref 12–16)
IMM GRANULOCYTES # BLD AUTO: 0.02 X10*3/UL (ref 0–0.7)
IMM GRANULOCYTES NFR BLD AUTO: 0.3 % (ref 0–0.9)
LDLC SERPL CALC-MCNC: 84 MG/DL
LYMPHOCYTES # BLD AUTO: 2.32 X10*3/UL (ref 1.2–4.8)
LYMPHOCYTES NFR BLD AUTO: 31 %
MCH RBC QN AUTO: 28.2 PG (ref 26–34)
MCHC RBC AUTO-ENTMCNC: 32.3 G/DL (ref 32–36)
MCV RBC AUTO: 87 FL (ref 80–100)
MONOCYTES # BLD AUTO: 0.84 X10*3/UL (ref 0.1–1)
MONOCYTES NFR BLD AUTO: 11.2 %
NEUTROPHILS # BLD AUTO: 4.11 X10*3/UL (ref 1.2–7.7)
NEUTROPHILS NFR BLD AUTO: 54.9 %
NON HDL CHOLESTEROL: 117 MG/DL (ref 0–149)
NRBC BLD-RTO: 0 /100 WBCS (ref 0–0)
PLATELET # BLD AUTO: 331 X10*3/UL (ref 150–450)
POTASSIUM SERPL-SCNC: 4.5 MMOL/L (ref 3.5–5.3)
PROT SERPL-MCNC: 7.3 G/DL (ref 6.4–8.2)
RBC # BLD AUTO: 4.96 X10*6/UL (ref 4–5.2)
SODIUM SERPL-SCNC: 137 MMOL/L (ref 136–145)
TRIGL SERPL-MCNC: 165 MG/DL (ref 0–149)
TSH SERPL-ACNC: 1.8 MIU/L (ref 0.44–3.98)
VIT B12 SERPL-MCNC: 667 PG/ML (ref 211–911)
VLDL: 33 MG/DL (ref 0–40)
WBC # BLD AUTO: 7.5 X10*3/UL (ref 4.4–11.3)

## 2024-07-29 PROCEDURE — 83036 HEMOGLOBIN GLYCOSYLATED A1C: CPT

## 2024-07-29 PROCEDURE — 85025 COMPLETE CBC W/AUTO DIFF WBC: CPT

## 2024-07-29 PROCEDURE — 82607 VITAMIN B-12: CPT

## 2024-07-29 PROCEDURE — 80061 LIPID PANEL: CPT

## 2024-07-29 PROCEDURE — 84443 ASSAY THYROID STIM HORMONE: CPT

## 2024-07-29 PROCEDURE — 80053 COMPREHEN METABOLIC PANEL: CPT

## 2024-07-29 PROCEDURE — 36415 COLL VENOUS BLD VENIPUNCTURE: CPT

## 2024-08-01 ENCOUNTER — OFFICE VISIT (OUTPATIENT)
Dept: PRIMARY CARE | Facility: CLINIC | Age: 69
End: 2024-08-01
Payer: COMMERCIAL

## 2024-08-01 ENCOUNTER — APPOINTMENT (OUTPATIENT)
Dept: PRIMARY CARE | Facility: CLINIC | Age: 69
End: 2024-08-01
Payer: COMMERCIAL

## 2024-08-01 VITALS
OXYGEN SATURATION: 97 % | HEIGHT: 66 IN | WEIGHT: 194 LBS | HEART RATE: 72 BPM | DIASTOLIC BLOOD PRESSURE: 72 MMHG | BODY MASS INDEX: 31.18 KG/M2 | SYSTOLIC BLOOD PRESSURE: 126 MMHG | TEMPERATURE: 98 F

## 2024-08-01 DIAGNOSIS — Z12.31 ENCOUNTER FOR SCREENING MAMMOGRAM FOR MALIGNANT NEOPLASM OF BREAST: ICD-10-CM

## 2024-08-01 DIAGNOSIS — M50.90 CERVICAL DISC DISEASE: ICD-10-CM

## 2024-08-01 DIAGNOSIS — Z00.00 ANNUAL PHYSICAL EXAM: Primary | ICD-10-CM

## 2024-08-01 DIAGNOSIS — E78.2 MIXED HYPERLIPIDEMIA: ICD-10-CM

## 2024-08-01 DIAGNOSIS — R73.03 PREDIABETES: ICD-10-CM

## 2024-08-01 DIAGNOSIS — J43.9 PULMONARY EMPHYSEMA, UNSPECIFIED EMPHYSEMA TYPE (MULTI): ICD-10-CM

## 2024-08-01 DIAGNOSIS — J43.2 CENTRILOBULAR EMPHYSEMA (MULTI): ICD-10-CM

## 2024-08-01 DIAGNOSIS — I73.9 CLAUDICATION OF CALF MUSCLES (CMS-HCC): ICD-10-CM

## 2024-08-01 DIAGNOSIS — R25.2 CRAMPS OF LOWER EXTREMITY: ICD-10-CM

## 2024-08-01 DIAGNOSIS — Z87.891 HISTORY OF TOBACCO ABUSE: ICD-10-CM

## 2024-08-01 PROCEDURE — 1159F MED LIST DOCD IN RCRD: CPT | Performed by: FAMILY MEDICINE

## 2024-08-01 PROCEDURE — 1036F TOBACCO NON-USER: CPT | Performed by: FAMILY MEDICINE

## 2024-08-01 PROCEDURE — 99214 OFFICE O/P EST MOD 30 MIN: CPT | Performed by: FAMILY MEDICINE

## 2024-08-01 PROCEDURE — 1160F RVW MEDS BY RX/DR IN RCRD: CPT | Performed by: FAMILY MEDICINE

## 2024-08-01 PROCEDURE — 99397 PER PM REEVAL EST PAT 65+ YR: CPT | Performed by: FAMILY MEDICINE

## 2024-08-01 PROCEDURE — 3008F BODY MASS INDEX DOCD: CPT | Performed by: FAMILY MEDICINE

## 2024-08-01 RX ORDER — METFORMIN HYDROCHLORIDE 500 MG/1
1000 TABLET, EXTENDED RELEASE ORAL
Qty: 180 TABLET | Refills: 3 | Status: SHIPPED | OUTPATIENT
Start: 2024-08-01 | End: 2025-08-01

## 2024-08-01 ASSESSMENT — PATIENT HEALTH QUESTIONNAIRE - PHQ9
2. FEELING DOWN, DEPRESSED OR HOPELESS: SEVERAL DAYS
1. LITTLE INTEREST OR PLEASURE IN DOING THINGS: SEVERAL DAYS
10. IF YOU CHECKED OFF ANY PROBLEMS, HOW DIFFICULT HAVE THESE PROBLEMS MADE IT FOR YOU TO DO YOUR WORK, TAKE CARE OF THINGS AT HOME, OR GET ALONG WITH OTHER PEOPLE: SOMEWHAT DIFFICULT
SUM OF ALL RESPONSES TO PHQ9 QUESTIONS 1 AND 2: 2

## 2024-08-01 NOTE — PROGRESS NOTES
Subjective   Patient ID: Claudia Coffey is a 69 y.o. female who presents for annual physical exam and follow up on labs and chronic labs.  Also cramps    HPI  Concerns today: Having major cramps in the legs ribs and shoulders.  Wake her during the day. Drinking lots of water daily.  No changes in medications.  The cramps last until she puts heat on it and taking leg cramp pills.  She Takes homeopathic from [employer].  She has been having this since starting the Symbicort.    Had normal US of the lower extremity  Had Normal CJ of the lower extremities.      In general the patient states that her health is: good    Regular dental visits: Regular dental visit and has partials  Vision problems: no chagnes  Hearing loss: none    Diet: Trying Mediter[place]  Exercise:walking and aerobic exercise  Weight concerns: yes    Tobacco use:none  Alcohol use:none  Illicit drug use:none    Breast cancer screening:  Last mammogram: 10/2023.  Repeat 1 year  Regular self breast exams: yes  Any changes in the breast:no    Colon cancer screening:  Cologuard neg 1/3/24    Reviewed chronic medical conditions  Review of Systems    Objective   Physical Exam  General: Patient is alert and oriented ×3 and appears in no acute distress. No respiratory distress.    Head: Atraumatic normocephalic.    Eyes: EOMI, PERRLA      Ears: Canals patent without any irritation, tympanic membranes without inflammation, no swelling, normal light reflex.    Nose: Nares patent. Turbinates are not swollen. No discharge.    Mouth: Normal mucosa. Moist. No erythema, exudates, tonsillar enlargement.    Neck: Normal range of motion, no masses.  Thyroid is palpable and normal in size without any nodules. No anterior cervical or posterior cervical adenopathy.    Heart: Regular rate and rhythm, no murmurs clicks or gallops    Lungs: Clear to auscultation bilaterally without any rhonchi rales or wheezing, lung sounds heard throughout all lung fields    Abdomen: Soft,  nontender, no rigidity, rebound, guarding or organomegaly. Bowel sounds ×4 quadrants.    Musculoskeletal: Normal range of motion, strength is grossly intact in the proximal distal muscles of the upper and lower extremities bilaterally, deep tendon reflexes +2 out of 4 and symmetric bilaterally at the patella, Achilles, biceps, triceps, sensation intact.    Nerves: Cranial nerves II through XII appear grossly intact and without deficit    Skin: Intact, dry, no rashes or erythema    Psych: Normal affect.  Assessment/Plan   Problem List Items Addressed This Visit       Cervical disc disease    Mixed hyperlipidemia    Prediabetes    Relevant Medications    metFORMIN XR (Glucophage-XR) 500 mg 24 hr tablet    Cramps of lower extremity    Pulmonary emphysema (Multi)    Relevant Orders    CT lung screening follow up CT chest wo IV contrast    Complete Pulmonary Function Test Pre/Post Bronchodialator (Spirometry Pre/Post/DLCO/Lung Volumes)     Other Visit Diagnoses       Annual physical exam    -  Primary    History of tobacco abuse        Relevant Orders    CT lung screening follow up CT chest wo IV contrast    Complete Pulmonary Function Test Pre/Post Bronchodialator (Spirometry Pre/Post/DLCO/Lung Volumes)    Encounter for screening mammogram for malignant neoplasm of breast        Relevant Orders    BI mammo bilateral screening tomosynthesis    Claudication of calf muscles (CMS-HCC)              Reviewed in for the patient's past medical history, surgical history, family history, social history      Patient is a 68-year-old female   Anticipatory guidance given  Mammogram done in October 2023 and fibrocystic breasts.  Repeat in 1 year.  Had US 10/23  Cologuard was negative in December 23, 2020 and 1/3/2024  DEXA 2020- Normal without any osteopenia or osteoporosis  No need to follow-up with gynecologist at this time      Continue vitamin D 2000 IUs daily and coenzyme Q10 100 mg daily  Discussed diet and exercise. Patient  has started exercising regularly.  Prediabetes-hemoglobin A1c 6.3 in the office July 2024  -Discussed the risks of elevated blood sugars  Stopped metformin  mg 2 tabs daily due to diarrhea and Jardiance due to frequent urination.  We are restarting Metformin  mg 2 tabs daily.  Cannot use GLP-1 due to history of pancreatitis  Discussed diet and exercise  The patient was instructed to pick one thing each month and change that in her diet to more healthy  Instructed to drink orange juice sparingly       Hypertriglyceridemia- stable  Patient will work on diet and exercise and taking rosuvastatin and coenzyme Q 10       Cholesterol 169 mg/dL  VLDL 33 mg/dL   HDL-Cholesterol 52.1 mg/dL  Triglycerides 165 High  mg/dL    Cholesterol/HDL Ratio 3.2  Non HDL Cholesterol 117 mg/dL    LDL Calculated 84 mg/dL             DDD cervical thoracic anmd lumbar- Disability for this- stable     Shortness of breath on exertion.  Mild COPD- stable   EKG was done in the office and showed normal sinus rate and rhythm as read by myself. Normal axis. No ST elevations or depressions. Normal T waves. Normal R wave progression.  PFTs done December 2022 showed normal spirometry.  There is a suggestion of small airway disease.  Diffusing capacity is moderately reduced.  Suggestive of pulmonary parenchymal or pulmonary vascular disease.    Reordered PFT's 2024  Patient has 30-year pack   echo12/22: Left ventricular systolic function is low normal with a 50-55% estimated ejection fraction.  CT chest low dose 12/22-there are no suspicious parenchymal lung nodules.  There was a recommendation for 1 year follow-up.  She did show mild atherosclerotic disease of the thoracic aorta and of the coronary arteries.  Reordered low dose CT 2024  Would suggest starting NAC  Restart the Sigulair 1 tab daily  Symbicort 1 puff 2 x day  Use rescue inhaler if needed and has not needed    Leg cramps  - Homeopathic leg cramps from [employer] and put into the  water and sip on it.   - Stay hydrated  - CJ normal  - Normal electrolytes  - Has degenerative changes.

## 2024-08-05 ENCOUNTER — HOSPITAL ENCOUNTER (OUTPATIENT)
Dept: RESPIRATORY THERAPY | Facility: HOSPITAL | Age: 69
Discharge: HOME | End: 2024-08-05
Payer: COMMERCIAL

## 2024-08-05 DIAGNOSIS — Z87.891 HISTORY OF TOBACCO ABUSE: ICD-10-CM

## 2024-08-05 DIAGNOSIS — J43.2 CENTRILOBULAR EMPHYSEMA (MULTI): ICD-10-CM

## 2024-08-05 LAB
MGC ASCENT PFT - FEV1 - POST: 2.02
MGC ASCENT PFT - FEV1 - PRE: 1.98
MGC ASCENT PFT - FEV1 - PREDICTED: 2.37
MGC ASCENT PFT - FVC - POST: 2.89
MGC ASCENT PFT - FVC - PRE: 3.01
MGC ASCENT PFT - FVC - PREDICTED: 3.05

## 2024-08-05 PROCEDURE — 94726 PLETHYSMOGRAPHY LUNG VOLUMES: CPT | Performed by: INTERNAL MEDICINE

## 2024-08-05 PROCEDURE — 94060 EVALUATION OF WHEEZING: CPT | Performed by: INTERNAL MEDICINE

## 2024-08-05 PROCEDURE — 94060 EVALUATION OF WHEEZING: CPT

## 2024-08-05 PROCEDURE — 94729 DIFFUSING CAPACITY: CPT | Performed by: INTERNAL MEDICINE

## 2024-08-05 PROCEDURE — 94726 PLETHYSMOGRAPHY LUNG VOLUMES: CPT

## 2024-08-05 PROCEDURE — 94729 DIFFUSING CAPACITY: CPT

## 2024-08-05 PROCEDURE — 98960 EDU&TRN PT SELF-MGMT NQHP 1: CPT

## 2024-08-05 PROCEDURE — 94664 DEMO&/EVAL PT USE INHALER: CPT | Mod: 59

## 2024-08-05 PROCEDURE — 94760 N-INVAS EAR/PLS OXIMETRY 1: CPT

## 2024-08-26 DIAGNOSIS — E78.2 MIXED HYPERLIPIDEMIA: ICD-10-CM

## 2024-08-26 RX ORDER — ROSUVASTATIN CALCIUM 5 MG/1
5 TABLET, COATED ORAL NIGHTLY
Qty: 90 TABLET | Refills: 3 | Status: SHIPPED | OUTPATIENT
Start: 2024-08-26 | End: 2025-08-26

## 2024-10-22 ENCOUNTER — HOSPITAL ENCOUNTER (OUTPATIENT)
Dept: RADIOLOGY | Facility: HOSPITAL | Age: 69
Discharge: HOME | End: 2024-10-22
Payer: COMMERCIAL

## 2024-10-22 VITALS — WEIGHT: 197 LBS | HEIGHT: 65 IN | BODY MASS INDEX: 32.82 KG/M2

## 2024-10-22 DIAGNOSIS — Z87.891 HISTORY OF TOBACCO ABUSE: ICD-10-CM

## 2024-10-22 DIAGNOSIS — Z12.31 ENCOUNTER FOR SCREENING MAMMOGRAM FOR MALIGNANT NEOPLASM OF BREAST: ICD-10-CM

## 2024-10-22 DIAGNOSIS — J43.2 CENTRILOBULAR EMPHYSEMA (MULTI): ICD-10-CM

## 2024-10-22 PROCEDURE — 71271 CT THORAX LUNG CANCER SCR C-: CPT | Performed by: RADIOLOGY

## 2024-10-22 PROCEDURE — 71271 CT THORAX LUNG CANCER SCR C-: CPT

## 2024-10-22 PROCEDURE — 77067 SCR MAMMO BI INCL CAD: CPT

## 2024-10-22 PROCEDURE — 77067 SCR MAMMO BI INCL CAD: CPT | Performed by: RADIOLOGY

## 2024-10-22 PROCEDURE — 77063 BREAST TOMOSYNTHESIS BI: CPT | Performed by: RADIOLOGY

## 2024-10-23 ENCOUNTER — TELEPHONE (OUTPATIENT)
Dept: PRIMARY CARE | Facility: CLINIC | Age: 69
End: 2024-10-23
Payer: COMMERCIAL

## 2024-10-23 DIAGNOSIS — R91.8 PULMONARY NODULES: Primary | ICD-10-CM

## 2024-10-23 DIAGNOSIS — K76.0 NONALCOHOLIC FATTY LIVER: ICD-10-CM

## 2024-10-23 NOTE — TELEPHONE ENCOUNTER
----- Message from Solitario Cole sent at 10/23/2024  3:35 PM EDT -----  Please let the patient know that moderate to severe emphysema is present.  She has 4 nodules that are less than 3 mm.  There is also a small hiatal hernia.  She has fatty liver.  We will repeat the low-dose CT in 1 year  Will get FibroScan of the liver

## 2024-10-23 NOTE — RESULT ENCOUNTER NOTE
Please let the patient know that moderate to severe emphysema is present.  She has 4 nodules that are less than 3 mm.  There is also a small hiatal hernia.  She has fatty liver.  We will repeat the low-dose CT in 1 year  Will get FibroScan of the liver

## 2024-10-30 ENCOUNTER — HOSPITAL ENCOUNTER (OUTPATIENT)
Dept: RADIOLOGY | Facility: HOSPITAL | Age: 69
Discharge: HOME | End: 2024-10-30
Payer: COMMERCIAL

## 2024-10-30 DIAGNOSIS — K76.0 NONALCOHOLIC FATTY LIVER: ICD-10-CM

## 2024-10-30 PROCEDURE — 76981 USE PARENCHYMA: CPT | Performed by: RADIOLOGY

## 2024-10-30 PROCEDURE — 76981 USE PARENCHYMA: CPT

## 2024-10-31 NOTE — CARE PLAN
The patient's goals for the shift include      The clinical goals for the shift include Pt swelling will decrease throughout this shift.    Over the shift, the patient did not make progress toward the following goals. Barriers to progression include ***. Recommendations to address these barriers include ***.     no weight-bearing restrictions

## 2024-11-14 PROBLEM — J96.00 ACUTE RESPIRATORY FAILURE: Status: RESOLVED | Noted: 2024-02-19 | Resolved: 2024-11-14

## 2024-11-27 ENCOUNTER — TELEPHONE (OUTPATIENT)
Dept: PRIMARY CARE | Facility: CLINIC | Age: 69
End: 2024-11-27
Payer: COMMERCIAL

## 2024-11-27 NOTE — TELEPHONE ENCOUNTER
Pt lm stating symbicort causing leg cramps and muscle spasms, has already lowered her dose would like to possibly switch to something else and is also requesting switching to a non statin cholesterol medication

## 2024-12-02 DIAGNOSIS — E78.2 MIXED HYPERLIPIDEMIA: Primary | ICD-10-CM

## 2024-12-02 RX ORDER — PITAVASTATIN CALCIUM 2.09 MG/1
2 TABLET, FILM COATED ORAL DAILY
Qty: 30 TABLET | Refills: 3 | Status: SHIPPED | OUTPATIENT
Start: 2024-12-02 | End: 2025-01-01

## 2024-12-03 NOTE — TELEPHONE ENCOUNTER
Pt notified, she states the symbicort inhaler is what is causing muscle aches so she has now split the dose she only takes 1 puff in the morning and 1 in the evening but she is needing her regular dose so she feels she needs to switch the inhaler completely if you would please prescribe something else in place of symbicort?

## 2024-12-09 NOTE — TELEPHONE ENCOUNTER
Pt called back lm stating she was wanting to switch cholesterol medication to something that was not a statin but the livalo that was sent in she is saying is a statin?

## 2024-12-10 NOTE — TELEPHONE ENCOUNTER
Pt notified she is going to try the livalo, she states statins are causing her fatty liver to swell and she has cramping.

## 2025-01-29 LAB
ALBUMIN SERPL-MCNC: 4.5 G/DL (ref 3.6–5.1)
ALBUMIN/GLOB SERPL: 1.5 (CALC) (ref 1–2.5)
ALP SERPL-CCNC: 66 U/L (ref 37–153)
ALT SERPL-CCNC: 44 U/L (ref 6–29)
AST SERPL-CCNC: 26 U/L (ref 10–35)
BASOPHILS # BLD AUTO: 40 CELLS/UL (ref 0–200)
BASOPHILS NFR BLD AUTO: 0.6 %
BILIRUB SERPL-MCNC: 0.5 MG/DL (ref 0.2–1.2)
BUN SERPL-MCNC: 9 MG/DL (ref 7–25)
BUN/CREAT SERPL: ABNORMAL (CALC) (ref 6–22)
CALCIUM SERPL-MCNC: 9.7 MG/DL (ref 8.6–10.4)
CHLORIDE SERPL-SCNC: 102 MMOL/L (ref 98–110)
CHOLEST SERPL-MCNC: 292 MG/DL
CHOLEST/HDLC SERPL: 7.3 (CALC)
CO2 SERPL-SCNC: 26 MMOL/L (ref 20–32)
CREAT SERPL-MCNC: 0.67 MG/DL (ref 0.5–1.05)
EGFRCR SERPLBLD CKD-EPI 2021: 96 ML/MIN/1.73M2
EOSINOPHIL # BLD AUTO: 201 CELLS/UL (ref 15–500)
EOSINOPHIL NFR BLD AUTO: 3 %
ERYTHROCYTE [DISTWIDTH] IN BLOOD BY AUTOMATED COUNT: 13.6 % (ref 11–15)
GLOBULIN SER CALC-MCNC: 3.1 G/DL (CALC) (ref 1.9–3.7)
GLUCOSE SERPL-MCNC: 108 MG/DL (ref 65–99)
HBA1C MFR BLD: 6.4 % OF TOTAL HGB
HCT VFR BLD AUTO: 44.2 % (ref 35–45)
HDLC SERPL-MCNC: 40 MG/DL
HGB BLD-MCNC: 14.5 G/DL (ref 11.7–15.5)
LDLC SERPL CALC-MCNC: ABNORMAL MG/DL
LYMPHOCYTES # BLD AUTO: 2124 CELLS/UL (ref 850–3900)
LYMPHOCYTES NFR BLD AUTO: 31.7 %
MCH RBC QN AUTO: 29.2 PG (ref 27–33)
MCHC RBC AUTO-ENTMCNC: 32.8 G/DL (ref 32–36)
MCV RBC AUTO: 89.1 FL (ref 80–100)
MONOCYTES # BLD AUTO: 744 CELLS/UL (ref 200–950)
MONOCYTES NFR BLD AUTO: 11.1 %
NEUTROPHILS # BLD AUTO: 3591 CELLS/UL (ref 1500–7800)
NEUTROPHILS NFR BLD AUTO: 53.6 %
NONHDLC SERPL-MCNC: 252 MG/DL (CALC)
PLATELET # BLD AUTO: 391 THOUSAND/UL (ref 140–400)
PMV BLD REES-ECKER: 10.8 FL (ref 7.5–12.5)
POTASSIUM SERPL-SCNC: 4.6 MMOL/L (ref 3.5–5.3)
PROT SERPL-MCNC: 7.6 G/DL (ref 6.1–8.1)
RBC # BLD AUTO: 4.96 MILLION/UL (ref 3.8–5.1)
SODIUM SERPL-SCNC: 136 MMOL/L (ref 135–146)
TRIGL SERPL-MCNC: 405 MG/DL
TSH SERPL-ACNC: 1.58 MIU/L (ref 0.4–4.5)
VIT B12 SERPL-MCNC: 485 PG/ML (ref 200–1100)
WBC # BLD AUTO: 6.7 THOUSAND/UL (ref 3.8–10.8)

## 2025-02-03 ENCOUNTER — APPOINTMENT (OUTPATIENT)
Dept: PRIMARY CARE | Facility: CLINIC | Age: 70
End: 2025-02-03
Payer: COMMERCIAL

## 2025-02-03 VITALS
DIASTOLIC BLOOD PRESSURE: 76 MMHG | OXYGEN SATURATION: 97 % | BODY MASS INDEX: 32.65 KG/M2 | SYSTOLIC BLOOD PRESSURE: 132 MMHG | TEMPERATURE: 97.8 F | HEART RATE: 84 BPM | HEIGHT: 65 IN | WEIGHT: 196 LBS

## 2025-02-03 DIAGNOSIS — E11.69 TYPE 2 DIABETES MELLITUS WITH OBESITY (MULTI): ICD-10-CM

## 2025-02-03 DIAGNOSIS — E78.2 MIXED HYPERLIPIDEMIA: ICD-10-CM

## 2025-02-03 DIAGNOSIS — I73.9 CLAUDICATION OF CALF MUSCLES (CMS-HCC): ICD-10-CM

## 2025-02-03 DIAGNOSIS — E78.1 HYPERTRIGLYCERIDEMIA: ICD-10-CM

## 2025-02-03 DIAGNOSIS — R79.89 ELEVATED LIVER FUNCTION TESTS: ICD-10-CM

## 2025-02-03 DIAGNOSIS — J43.2 CENTRILOBULAR EMPHYSEMA (MULTI): ICD-10-CM

## 2025-02-03 DIAGNOSIS — Z87.891 HISTORY OF TOBACCO ABUSE: ICD-10-CM

## 2025-02-03 DIAGNOSIS — E11.9 TYPE 2 DIABETES MELLITUS WITHOUT COMPLICATION, WITHOUT LONG-TERM CURRENT USE OF INSULIN (MULTI): ICD-10-CM

## 2025-02-03 DIAGNOSIS — T46.6X5A MYALGIA DUE TO STATIN: ICD-10-CM

## 2025-02-03 DIAGNOSIS — Z00.00 MEDICARE ANNUAL WELLNESS VISIT, SUBSEQUENT: Primary | ICD-10-CM

## 2025-02-03 DIAGNOSIS — M85.89 OSTEOPENIA OF MULTIPLE SITES: ICD-10-CM

## 2025-02-03 DIAGNOSIS — M79.10 MYALGIA DUE TO STATIN: ICD-10-CM

## 2025-02-03 DIAGNOSIS — E66.9 TYPE 2 DIABETES MELLITUS WITH OBESITY (MULTI): ICD-10-CM

## 2025-02-03 DIAGNOSIS — J43.9 PULMONARY EMPHYSEMA, UNSPECIFIED EMPHYSEMA TYPE (MULTI): ICD-10-CM

## 2025-02-03 PROCEDURE — 1123F ACP DISCUSS/DSCN MKR DOCD: CPT | Performed by: FAMILY MEDICINE

## 2025-02-03 PROCEDURE — 3078F DIAST BP <80 MM HG: CPT | Performed by: FAMILY MEDICINE

## 2025-02-03 PROCEDURE — 1170F FXNL STATUS ASSESSED: CPT | Performed by: FAMILY MEDICINE

## 2025-02-03 PROCEDURE — G0439 PPPS, SUBSEQ VISIT: HCPCS | Performed by: FAMILY MEDICINE

## 2025-02-03 PROCEDURE — 1160F RVW MEDS BY RX/DR IN RCRD: CPT | Performed by: FAMILY MEDICINE

## 2025-02-03 PROCEDURE — 3008F BODY MASS INDEX DOCD: CPT | Performed by: FAMILY MEDICINE

## 2025-02-03 PROCEDURE — 1158F ADVNC CARE PLAN TLK DOCD: CPT | Performed by: FAMILY MEDICINE

## 2025-02-03 PROCEDURE — 99214 OFFICE O/P EST MOD 30 MIN: CPT | Performed by: FAMILY MEDICINE

## 2025-02-03 PROCEDURE — 1036F TOBACCO NON-USER: CPT | Performed by: FAMILY MEDICINE

## 2025-02-03 PROCEDURE — 1159F MED LIST DOCD IN RCRD: CPT | Performed by: FAMILY MEDICINE

## 2025-02-03 PROCEDURE — 3075F SYST BP GE 130 - 139MM HG: CPT | Performed by: FAMILY MEDICINE

## 2025-02-03 RX ORDER — ICOSAPENT ETHYL 0.5 G/1
2 CAPSULE ORAL
Qty: 240 CAPSULE | Refills: 11 | Status: SHIPPED | OUTPATIENT
Start: 2025-02-03 | End: 2026-02-03

## 2025-02-03 RX ORDER — FENOFIBRATE 145 MG/1
145 TABLET, FILM COATED ORAL DAILY
Qty: 30 TABLET | Refills: 5 | Status: SHIPPED | OUTPATIENT
Start: 2025-02-03 | End: 2025-08-02

## 2025-02-03 RX ORDER — TIRZEPATIDE 5 MG/.5ML
5 INJECTION, SOLUTION SUBCUTANEOUS WEEKLY
Qty: 2 ML | Refills: 11 | Status: SHIPPED | OUTPATIENT
Start: 2025-02-03 | End: 2025-02-05

## 2025-02-03 RX ORDER — ALBUTEROL SULFATE 90 UG/1
INHALANT RESPIRATORY (INHALATION)
Qty: 18 G | Refills: 3 | Status: SHIPPED | OUTPATIENT
Start: 2025-02-03

## 2025-02-03 ASSESSMENT — ACTIVITIES OF DAILY LIVING (ADL)
DOING_HOUSEWORK: INDEPENDENT
GROCERY_SHOPPING: INDEPENDENT
TAKING_MEDICATION: INDEPENDENT
BATHING: INDEPENDENT
DRESSING: INDEPENDENT
MANAGING_FINANCES: INDEPENDENT

## 2025-02-03 NOTE — PROGRESS NOTES
Subjective   Patient ID: Claudia Coffey is a 69 y.o. female who presents for medicare exam, talk about your meds Go over lab work Left shoulder pain in it everyday in the evening after relaxing, in hand and wrist too. Not sure if carpal tunnel      HPI  Concerns today: talk about your meds Go over lab work Left shoulder pain in it everyday in the evening after relaxing, in hand and wrist too. Not sure if carpal tunnel    Trellegy is doing well.  1 x day working well.     Statin- She is on Livalo and having issues with cramping in the hands and feet and chest.  Has not tolerated other statins.      Metformin has been causing her indigestion.     She is eating fruit smoothes in the am with banana or apple.  They are eating sweets.   is working at [employer] stating and bringing home junk foods like donuts.      1/2025 vitamin B12 485, TSH 1.58, white blood cell 6.7, hemoglobin 14.5, hematocrit 44.2, platelets 391, differential normal  Hemoglobin A1c was 6.4  Electrolytes normal, kidney function normal, liver function showed AST of 26, ALT of 44    In general the patient states that her health is: good    Regular dental visits: Regular dental visit and has partials  Vision problems: no chagnes  Hearing loss: none    Diet: Trying Mediterranean.  Had been eating a lot of sweets.   Exercise:walking and aerobic exercise  Weight concerns: yes    Tobacco use:none  Alcohol use:none  Illicit drug use:none    Breast cancer screening:  Last mammogram: 10/2023.  Repeat 1 year  Regular self breast exams: yes  Any changes in the breast:no    Colon cancer screening:  Cologuard neg 1/3/24    Reviewed chronic medical conditions  Review of Systems    Objective   Physical Exam  General: Patient is alert and oriented ×3 and appears in no acute distress. No respiratory distress.    Head: Atraumatic normocephalic.    Eyes: EOMI, PERRLA      Ears: Canals patent without any irritation, tympanic membranes without inflammation, no swelling,  normal light reflex.    Nose: Nares patent. Turbinates are not swollen. No discharge.    Mouth: Normal mucosa. Moist. No erythema, exudates, tonsillar enlargement.    Neck: Normal range of motion, no masses.  Thyroid is palpable and normal in size without any nodules. No anterior cervical or posterior cervical adenopathy.    Heart: Regular rate and rhythm, no murmurs clicks or gallops    Lungs: Clear to auscultation bilaterally without any rhonchi rales or wheezing, lung sounds heard throughout all lung fields    Abdomen: Soft, nontender, no rigidity, rebound, guarding or organomegaly. Bowel sounds ×4 quadrants.    Musculoskeletal: Normal range of motion, strength is grossly intact in the proximal distal muscles of the upper and lower extremities bilaterally, deep tendon reflexes +2 out of 4 and symmetric bilaterally at the patella, Achilles, biceps, triceps, sensation intact.    Nerves: Cranial nerves II through XII appear grossly intact and without deficit    Skin: Intact, dry, no rashes or erythema    Psych: Normal affect.  Assessment/Plan   Problem List Items Addressed This Visit       Mixed hyperlipidemia    Relevant Medications    fenofibrate (Tricor) 145 mg tablet    icosapent ethyL (Vascepa) 0.5 gram capsule    Other Relevant Orders    CBC and Auto Differential    Comprehensive Metabolic Panel    Hemoglobin A1C    Lipid Panel    Albumin-Creatinine Ratio, Urine Random    Vitamin B12    Osteopenia of multiple sites    RESOLVED: Prediabetes    Relevant Medications    tirzepatide (Mounjaro) 5 mg/0.5 mL pen injector    Pulmonary emphysema (Multi)    Relevant Medications    albuterol 90 mcg/actuation inhaler     Other Visit Diagnoses       Medicare annual wellness visit, subsequent    -  Primary    Type 2 diabetes mellitus with obesity (Multi)        Claudication of calf muscles (CMS-[facility])        History of tobacco abuse        Myalgia due to statin        Relevant Medications    fenofibrate (Tricor) 145 mg tablet     icosapent ethyL (Vascepa) 0.5 gram capsule    Hypertriglyceridemia        Relevant Medications    fenofibrate (Tricor) 145 mg tablet    icosapent ethyL (Vascepa) 0.5 gram capsule    Other Relevant Orders    CBC and Auto Differential    Comprehensive Metabolic Panel    Hemoglobin A1C    Lipid Panel    Albumin-Creatinine Ratio, Urine Random    Vitamin B12    Elevated liver function tests        Relevant Orders    CBC and Auto Differential    Comprehensive Metabolic Panel    Hemoglobin A1C    Lipid Panel    Albumin-Creatinine Ratio, Urine Random    Vitamin B12          Reviewed in for the patient's past medical history, surgical history, family history, social history      Patient is a 69-year-old female   Anticipatory guidance given  Mammogram  Had US 10/24  Cologuard was negative in December 23, 2020 and 1/3/2024  DEXA 2020- Normal without any osteopenia or osteoporosis  No need to follow-up with gynecologist at this time      Continue vitamin D 2000 IUs daily and coenzyme Q10 100 mg daily  Discussed diet and exercise. Patient has started exercising regularly.  Prediabetes-hemoglobin A1c 6.4 in the office 1/2025  -Discussed the risks of elevated blood sugars  Stopped metformin  mg 2 tabs daily due to diarrhea and Jardiance due to frequent urination.  We are restarting Metformin  mg 2 tabs daily.  Cannot use GLP-1 due to history of pancreatitis  Discussed diet and exercise  The patient was instructed to pick one thing each month and change that in her diet to more healthy  Instructed to drink orange juice sparingly       Hypertriglyceridemia- stable  Patient will work on diet and exercise and taking rosuvastatin and coenzyme Q 10       CHOLESTEROL, TOTAL 292 High  mg/dL LDL-CHOLESTEROL --    HDL CHOLESTEROL 40 Low  mg/dL CHOL/HDLC RATIO 7.3 High  (calc)   TRIGLYCERIDES 405 High  mg/dL  NON HDL CHOLESTEROL 252 High  mg/dL (calc)    1/2025        DDD cervical thoracic anmd lumbar- Disability for this-  stable     Emphysema/ COPD- stable   EKG was done in the office and showed normal sinus rate and rhythm as read by myself. Normal axis. No ST elevations or depressions. Normal T waves. Normal R wave progression.  PFTs done 2024 Reduced FEV1/FVC with a normal FVC indicates a possible airflow obstructive defect. Lung volumes by plethysmography indicate a restrictive ventilatory impairment. DLCO values are consistent with pulmonary vascular impairment, emphysema with preserved lung volume or anemia   Patient has 30-year pack   echo12/22: Left ventricular systolic function is low normal with a 50-55% estimated ejection fraction.  CT chest low dose 10/24-moderate to severe emphysema is present.  She has 4 nodules that are less than 3 mm.  There is also a small hiatal hernia.  She has fatty liver.  We will repeat the low-dose CT in 1 year  Reordered low dose CT 2025  Would suggest starting NAC   Sigulair 1 tab daily  Trellegy daily  Use rescue inhaler if needed and has not needed  Will be following with Dr Gaspar for Pulmonology    Leg cramps  - Homeopathic leg cramps from [employer] and put into the water and sip on it.   - Stay hydrated  - CJ normal  - Normal electrolytes  - Has degenerative changes.    - Worsned with statins    Hyperlipidemia and Hypertriglyceridemia  Has statin intolerance due to myalgias and elevated LFT'.    We will try fenofibrate and Vascepa    RAMIREZ  Fibroscan done 10/2024  Elastography measurements which, in the absence of other known  clinical signs, effectively excludes compensated advanced chronic  liver disease. If there are known clinical signs, further testing is  needed for confirmation.      Cervicalgia and shoulder pain and carpal tunnel left hand  - Exercises given  - IF worseing pain contact the office  - Tylenol 500-1000 mg up to 3 x day as needed

## 2025-02-04 ENCOUNTER — TELEPHONE (OUTPATIENT)
Dept: PRIMARY CARE | Facility: CLINIC | Age: 70
End: 2025-02-04
Payer: COMMERCIAL

## 2025-02-04 NOTE — TELEPHONE ENCOUNTER
Received prio auth from pharmacy for Mounjaro DX in chart note is pre-diabetes but DX assiociated with visit is type 2.  What is the correct DX to use? This needs done to finish the prio auth. If type 2 diabetes is correct can you ammend your office note?

## 2025-02-05 PROBLEM — R73.03 PREDIABETES: Status: RESOLVED | Noted: 2023-07-26 | Resolved: 2025-02-05

## 2025-02-05 RX ORDER — TIRZEPATIDE 5 MG/.5ML
5 INJECTION, SOLUTION SUBCUTANEOUS WEEKLY
Qty: 2 ML | Refills: 11 | Status: SHIPPED | OUTPATIENT
Start: 2025-02-05

## 2025-03-12 ENCOUNTER — OFFICE VISIT (OUTPATIENT)
Dept: PULMONOLOGY | Facility: CLINIC | Age: 70
End: 2025-03-12
Payer: COMMERCIAL

## 2025-03-12 VITALS
DIASTOLIC BLOOD PRESSURE: 86 MMHG | HEART RATE: 84 BPM | WEIGHT: 185 LBS | RESPIRATION RATE: 16 BRPM | SYSTOLIC BLOOD PRESSURE: 129 MMHG | OXYGEN SATURATION: 94 % | BODY MASS INDEX: 30.79 KG/M2

## 2025-03-12 DIAGNOSIS — J43.9 PULMONARY EMPHYSEMA, UNSPECIFIED EMPHYSEMA TYPE (MULTI): Primary | ICD-10-CM

## 2025-03-12 DIAGNOSIS — R91.8 LUNG NODULES: ICD-10-CM

## 2025-03-12 DIAGNOSIS — R91.1 LUNG NODULE: ICD-10-CM

## 2025-03-12 PROCEDURE — 1123F ACP DISCUSS/DSCN MKR DOCD: CPT | Performed by: INTERNAL MEDICINE

## 2025-03-12 PROCEDURE — 99214 OFFICE O/P EST MOD 30 MIN: CPT | Performed by: INTERNAL MEDICINE

## 2025-03-12 PROCEDURE — 1159F MED LIST DOCD IN RCRD: CPT | Performed by: INTERNAL MEDICINE

## 2025-03-12 PROCEDURE — 1126F AMNT PAIN NOTED NONE PRSNT: CPT | Performed by: INTERNAL MEDICINE

## 2025-03-12 PROCEDURE — 1036F TOBACCO NON-USER: CPT | Performed by: INTERNAL MEDICINE

## 2025-03-12 RX ORDER — UMECLIDINIUM BROMIDE AND VILANTEROL TRIFENATATE 62.5; 25 UG/1; UG/1
1 POWDER RESPIRATORY (INHALATION) DAILY
Qty: 1 EACH | Refills: 11 | Status: SHIPPED | OUTPATIENT
Start: 2025-03-12

## 2025-03-12 ASSESSMENT — COLUMBIA-SUICIDE SEVERITY RATING SCALE - C-SSRS
6. HAVE YOU EVER DONE ANYTHING, STARTED TO DO ANYTHING, OR PREPARED TO DO ANYTHING TO END YOUR LIFE?: NO
1. IN THE PAST MONTH, HAVE YOU WISHED YOU WERE DEAD OR WISHED YOU COULD GO TO SLEEP AND NOT WAKE UP?: NO
2. HAVE YOU ACTUALLY HAD ANY THOUGHTS OF KILLING YOURSELF?: NO

## 2025-03-12 ASSESSMENT — PATIENT HEALTH QUESTIONNAIRE - PHQ9
SUM OF ALL RESPONSES TO PHQ9 QUESTIONS 1 AND 2: 0
1. LITTLE INTEREST OR PLEASURE IN DOING THINGS: NOT AT ALL
2. FEELING DOWN, DEPRESSED OR HOPELESS: NOT AT ALL

## 2025-03-12 ASSESSMENT — ENCOUNTER SYMPTOMS: DEPRESSION: 0

## 2025-03-12 ASSESSMENT — PAIN SCALES - GENERAL: PAINLEVEL_OUTOF10: 0-NO PAIN

## 2025-03-12 NOTE — PROGRESS NOTES
Department of Medicine  Division of Pulmonary, Critical Care, and Sleep Medicine  Consultation  [facility] - [address], [address]       Physician HPI (3/12/2025):   Pleasant 69-year-old woman with history of COPD presenting to [facility] care.  Patient had viral illness back in December/January, feeling much better now and back to baseline, her main symptom is dyspnea on exertion and occasional dry cough.  Using Trelegy, rarely needing albuterol.  All other systems reviewed and negative otherwise.    History of 30-pack-year, quit 2021.  Father with history of lung cancer.      Immunization History   Administered Date(s) Administered    Tdap vaccine, age 7 year and older (BOOSTRIX, ADACEL) 02/09/2024       Past Medical History:   Diagnosis Date    Cervical disc disorder, unspecified, unspecified cervical region 11/15/2022    Cervical disc disease    Pain in right shoulder 07/08/2021    Acute pain of right shoulder    Personal history of other diseases of the digestive system     History of fatty infiltration of liver    Personal history of other diseases of the digestive system     History of acute pancreatitis    Personal history of other diseases of the musculoskeletal system and connective tissue     History of osteopenia    Personal history of other endocrine, nutritional and metabolic disease 12/10/2020    History of hyperglycemia    Personal history of other specified conditions     History of balance disorder    Sciatica, unspecified side     Sciatic pain       Past Surgical History:   Procedure Laterality Date    OTHER SURGICAL HISTORY  07/28/2020    Dilation and curettage    OTHER SURGICAL HISTORY  07/28/2020    Ankle surgery    OTHER SURGICAL HISTORY  07/28/2020    Biopsy    OTHER SURGICAL HISTORY  07/28/2020    Colonoscopy    OTHER SURGICAL HISTORY  07/28/2020    Tubal ligation       Family History   Problem Relation Name Age of Onset    Breast cancer Daughter  45    Breast cancer Paternal  Grandmother  68    Ovarian cancer Father's Sister         Social History     Tobacco Use    Smoking status: Former     Current packs/day: 0.00     Average packs/day: 1 pack/day for 30.0 years (30.0 ttl pk-yrs)     Types: Cigarettes     Start date:      Quit date:      Years since quittin.2    Smokeless tobacco: Never   Vaping Use    Vaping status: Never Used   Substance Use Topics    Alcohol use: Never    Drug use: Never       Occupational & Environmental History:        Current Medications:  Current Outpatient Medications   Medication Instructions    acetaminophen (TYLENOL) 1,000 mg, oral, Every 8 hours PRN    albuterol 90 mcg/actuation inhaler inhale 1 to 2 puffs by mouth and INTO THE LUNGS every 4 to 6 hours if needed    budesonide-formoteroL (Symbicort) 160-4.5 mcg/actuation inhaler 2 puffs, inhalation, 2 times daily RT, Rinse mouth with water after use to reduce aftertaste and incidence of candidiasis. Do not swallow.    fenofibrate (TRICOR) 145 mg, oral, Daily    fluticasone (Flonase) 50 mcg/actuation nasal spray Administer 1 spray into each nostril once daily.    fluticasone-umeclidin-vilanter (Trelegy Ellipta) 100-62.5-25 mcg blister with device 1 puff, inhalation, Daily    icosapent ethyL (VASCEPA) 2 g, oral, 2 times daily (morning and late afternoon)    Mounjaro 5 mg, subcutaneous, Weekly    pitavastatin calcium (LIVALO) 2 mg, oral, Daily    umeclidinium-vilanteroL (Anoro Ellipta) 62.5-25 mcg/actuation blister with device 1 puff, inhalation, Daily        Drug Allergies/Intolerances:  Allergies   Allergen Reactions    Shellfish Containing Products Unknown, GI Upset and Hives        Visit Vitals  /86   Pulse 84   Resp 16   Wt 83.9 kg (185 lb)   SpO2 94%   BMI 30.79 kg/m²   OB Status Postmenopausal   Smoking Status Former   BSA 1.96 m²        Physical exam  Constitutional: Normal appearance.  HEENT: Normocephalic and atraumatic.  Cardiovascular: Normal rate and regular rhythm.  Pulmonary:  "Normal respiratory effort, bilateral clear breath sounds, no wheezing or rhonchi.  Musculoskeletal: No edema, no cyanosis.  Neurological: Awake, alert and oriented x3.  Psychiatric: Normal behavior, mood and affect.    Pulmonary Function Test Results     Pulmonary Functions Testing Results:    No results found for: \"FEV1\", \"FVC\", \"UXQ0UPN\", \"TLC\", \"DLCO\"     Chest Radiograph     XR chest 1 view 02/07/2024    Narrative  STUDY:  Chest Radiograph;  02/07/2024 6:22 PM  INDICATION:  Cough, fever.  COMPARISON:  XR chest 12/03/2021.  tachycardia chest lung cancer screen 12/01/2022.    ACCESSION NUMBER(S):  [id number]  ORDERING CLINICIAN:  BELEN DIXON  TECHNIQUE:  Frontal chest was obtained at 1822 hours.  FINDINGS:  CARDIOMEDIASTINAL SILHOUETTE:  Cardiomediastinal silhouette is normal in size and configuration.    LUNGS:  Lungs are clear.    ABDOMEN:  No remarkable upper abdominal findings.    BONES:  No acute osseous changes.    Impression  No radiographic evidence of acute cardiopulmonary disease.  Signed by Jorge Kim MD      Echocardiogram     No results found for this or any previous visit from the past 365 days.       Chest CT Scan     CT lung screening low dose 10/22/2024    Narrative  Interpreted By:  Zulema Salgado,  STUDY:  CT LUNG SCREENING LOW DOSE; 10/22/2024 10:19 am    INDICATION:  Signs/Symptoms:history of tobacco use.    COMPARISON:  12/01/2022    ACCESSION NUMBER(S):  [id number]    ORDERING CLINICIAN:  PONCHO LEIVA    TECHNIQUE:  Helical data acquisition of the chest was obtained without IV  contrast material.  Images were reformatted in axial, coronal, and  sagittal planes.    FINDINGS:  LUNGS AND AIRWAYS:  The trachea and central airways are patent. No endobronchial lesion  is seen.    There is moderate to severe bilateral upper lung predominant  centrilobular and paraseptal emphysema.There is no focal  consolidation, pleural effusion, or pneumothorax.    2 mm left apical nodule, image " 32/279.  3 mm left upper lobe nodule, image 67/79, stable  3 mm left upper lobe nodule, image 69/279.  3 mm left lower lobe nodule, image 77/279      MEDIASTINUM AND REINALDO, LOWER NECK AND AXILLA:  The visualized thyroid gland is within normal limits.  No evidence of thoracic lymphadenopathy by CT criteria.  Esophagus appears within normal limits as seen. Small hiatal hernia.    HEART AND VESSELS:  The thoracic aorta normal in course and caliber.There is mild  scattered calcified atherosclerosis present. Mildly dilated main  pulmonary artery measuring 3.2 cm. Estimated coronary artery calcium  score is 5. The cardiac chambers are not enlarged.  There is no pericardial effusion seen.    UPPER ABDOMEN:  Diffuse hepatic steatosis.        CHEST WALL AND OSSEOUS STRUCTURES:  Chest wall is within normal limits.  No acute osseous pathology.There are no suspicious osseous lesions.    Impression  1.  Few small bilateral noncalcified pulmonary nodules measuring up  to 3 mm, as described above. Continued screening with low-dose  noncontrast chest CT in 12 months (from current date) is recommended.  2. Moderate to severe upper lung predominant emphysema.  3. Estimated coronary artery calcium score is 5* which correlates  with at least 52nd percentile rank as compared to matched SOLIMAN-study  subjects([email or web address]://[email or web address]-[email or web address]/input.aspx).  4. Mildly dilated main pulmonary artery. Correlate with pulmonary  artery hypertension.  5. Diffuse hepatic steatosis. Consider PCP evaluation.    LUNG RADS CATEGORY:  Lung Rad: Lung-RADS 2 (Benign Appearance or Indolent Behavior)    Recommendation: Continue annual screening with Low Dose Chest CT in  12 months, recommended as per American College of Radiology  Guidelines Lung-RADS Version 2022.        **The patient's CAC score was measured with an FDA-cleared AI tool  that correlates well with traditional methods. However, due to the  non-gated CT scan and new algorithm, AI-powered  "scores should not  replace traditional cardiovascular risk assessment. For further  assistance, refer to the [facility] Cardiovascular Prevention  Program via an [facility] referral to 'Cardiology Prevention Program.'    MACRO:  None    Signed by: Zulema Carcamo 10/22/2024 12:07 PM  Dictation workstation:   [id number]       Laboratory Studies     Lab Results   Component Value Date    WBC 6.7 01/28/2025    HGB 14.5 01/28/2025    HCT 44.2 01/28/2025    MCV 89.1 01/28/2025     01/28/2025      Lab Results   Component Value Date    GLUCOSE 108 (H) 01/28/2025    CALCIUM 9.7 01/28/2025     01/28/2025    K 4.6 01/28/2025    CO2 26 01/28/2025     01/28/2025    BUN 9 01/28/2025    CREATININE 0.67 01/28/2025      Lab Results   Component Value Date    ALT 44 (H) 01/28/2025    AST 26 01/28/2025    ALKPHOS 66 01/28/2025    BILITOT 0.5 01/28/2025      Protime   Date/Time Value Ref Range Status   02/28/2024 07:12 PM 11.2 9.8 - 12.8 seconds Final     INR   Date/Time Value Ref Range Status   02/28/2024 07:12 PM 1.0 0.9 - 1.1 Final     No results found for: \"ICIGE\", \"IGE\", \"ICA04\", \"ASPFU\", \"IGG\", \"IGA\", \"IGM\"    Sputum Culture     No results found for: \"AFBCX\"   No results found for: \"RESPCULTCYFI\"  No results found for the last 90 days.          Assessment and Plan / Recommendations   Pleasant 69-year-old woman with history of COPD presenting to [facility] care.  Patient had viral illness back in December/January, feeling much better now and back to baseline, her main symptom currently is dyspnea with exertion which improving with weight loss.    1.  Emphysema on CT, no postbronchodilator obstruction on PFT from August 2024.  Rarely using albuterol, no exacerbations or hospitalization in the past 2 years.   -Change Trelegy to Anoro, continue albuterol as needed    2.  Lung nodules and right lung posterior pleural thickening on CT from October 2024 (which has worsened compared to CT from 2022)  -Follow-up with " low-dose CT in October    Return to clinic after CT or sooner with any concerns.    This dictation was created using voice recognition software. Phonetic and/or minor grammatical errors may exist.    Wai Moser MD  03/12/2025

## 2025-04-05 DIAGNOSIS — J43.9 PULMONARY EMPHYSEMA, UNSPECIFIED EMPHYSEMA TYPE (MULTI): ICD-10-CM

## 2025-04-07 RX ORDER — FLUTICASONE FUROATE, UMECLIDINIUM BROMIDE AND VILANTEROL TRIFENATATE 100; 62.5; 25 UG/1; UG/1; UG/1
1 POWDER RESPIRATORY (INHALATION) DAILY
Qty: 60 EACH | Refills: 3 | Status: SHIPPED | OUTPATIENT
Start: 2025-04-07

## 2025-05-30 DIAGNOSIS — E78.1 HYPERTRIGLYCERIDEMIA: ICD-10-CM

## 2025-05-30 DIAGNOSIS — M79.10 MYALGIA DUE TO STATIN: ICD-10-CM

## 2025-05-30 DIAGNOSIS — E78.2 MIXED HYPERLIPIDEMIA: ICD-10-CM

## 2025-05-30 DIAGNOSIS — T46.6X5A MYALGIA DUE TO STATIN: ICD-10-CM

## 2025-05-30 RX ORDER — FENOFIBRATE 145 MG/1
145 TABLET, FILM COATED ORAL DAILY
Qty: 90 TABLET | Refills: 1 | Status: SHIPPED | OUTPATIENT
Start: 2025-05-30 | End: 2025-11-26

## 2025-07-30 LAB
ALBUMIN SERPL-MCNC: 4.5 G/DL (ref 3.6–5.1)
ALP SERPL-CCNC: 72 U/L (ref 37–153)
ALT SERPL-CCNC: 27 U/L (ref 6–29)
ANION GAP SERPL CALCULATED.4IONS-SCNC: 8 MMOL/L (CALC) (ref 7–17)
AST SERPL-CCNC: 17 U/L (ref 10–35)
BASOPHILS # BLD AUTO: 30 CELLS/UL (ref 0–200)
BASOPHILS NFR BLD AUTO: 0.4 %
BILIRUB SERPL-MCNC: 0.4 MG/DL (ref 0.2–1.2)
BUN SERPL-MCNC: 11 MG/DL (ref 7–25)
CALCIUM SERPL-MCNC: 9.5 MG/DL (ref 8.6–10.4)
CHLORIDE SERPL-SCNC: 104 MMOL/L (ref 98–110)
CHOLEST SERPL-MCNC: 236 MG/DL
CHOLEST/HDLC SERPL: 6.1 (CALC)
CO2 SERPL-SCNC: 27 MMOL/L (ref 20–32)
CREAT SERPL-MCNC: 0.6 MG/DL (ref 0.5–1.05)
EGFRCR SERPLBLD CKD-EPI 2021: 98 ML/MIN/1.73M2
EOSINOPHIL # BLD AUTO: 143 CELLS/UL (ref 15–500)
EOSINOPHIL NFR BLD AUTO: 1.9 %
ERYTHROCYTE [DISTWIDTH] IN BLOOD BY AUTOMATED COUNT: 14.1 % (ref 11–15)
EST. AVERAGE GLUCOSE BLD GHB EST-MCNC: 114 MG/DL
EST. AVERAGE GLUCOSE BLD GHB EST-SCNC: 6.3 MMOL/L
GLUCOSE SERPL-MCNC: 91 MG/DL (ref 65–99)
HBA1C MFR BLD: 5.6 %
HCT VFR BLD AUTO: 44.4 % (ref 35–45)
HDLC SERPL-MCNC: 39 MG/DL
HGB BLD-MCNC: 14.3 G/DL (ref 11.7–15.5)
LDLC SERPL CALC-MCNC: 157 MG/DL (CALC)
LYMPHOCYTES # BLD AUTO: 2213 CELLS/UL (ref 850–3900)
LYMPHOCYTES NFR BLD AUTO: 29.5 %
MCH RBC QN AUTO: 28.9 PG (ref 27–33)
MCHC RBC AUTO-ENTMCNC: 32.2 G/DL (ref 32–36)
MCV RBC AUTO: 89.9 FL (ref 80–100)
MONOCYTES # BLD AUTO: 825 CELLS/UL (ref 200–950)
MONOCYTES NFR BLD AUTO: 11 %
NEUTROPHILS # BLD AUTO: 4290 CELLS/UL (ref 1500–7800)
NEUTROPHILS NFR BLD AUTO: 57.2 %
NONHDLC SERPL-MCNC: 197 MG/DL (CALC)
PLATELET # BLD AUTO: 355 THOUSAND/UL (ref 140–400)
PMV BLD REES-ECKER: 11 FL (ref 7.5–12.5)
POTASSIUM SERPL-SCNC: 4.5 MMOL/L (ref 3.5–5.3)
PROT SERPL-MCNC: 7.3 G/DL (ref 6.1–8.1)
RBC # BLD AUTO: 4.94 MILLION/UL (ref 3.8–5.1)
SODIUM SERPL-SCNC: 139 MMOL/L (ref 135–146)
TRIGL SERPL-MCNC: 237 MG/DL
VIT B12 SERPL-MCNC: 353 PG/ML (ref 200–1100)
WBC # BLD AUTO: 7.5 THOUSAND/UL (ref 3.8–10.8)

## 2025-08-03 DIAGNOSIS — E78.2 MIXED HYPERLIPIDEMIA: ICD-10-CM

## 2025-08-03 DIAGNOSIS — E78.1 HYPERTRIGLYCERIDEMIA: ICD-10-CM

## 2025-08-03 DIAGNOSIS — R79.89 ELEVATED LIVER FUNCTION TESTS: ICD-10-CM

## 2025-08-03 DIAGNOSIS — E11.9 TYPE 2 DIABETES MELLITUS WITHOUT COMPLICATION, WITHOUT LONG-TERM CURRENT USE OF INSULIN: ICD-10-CM

## 2025-08-04 ENCOUNTER — APPOINTMENT (OUTPATIENT)
Dept: PRIMARY CARE | Facility: CLINIC | Age: 70
End: 2025-08-04
Payer: COMMERCIAL

## 2025-08-04 VITALS
HEART RATE: 61 BPM | SYSTOLIC BLOOD PRESSURE: 118 MMHG | WEIGHT: 171 LBS | OXYGEN SATURATION: 99 % | HEIGHT: 65 IN | TEMPERATURE: 98.2 F | BODY MASS INDEX: 28.49 KG/M2 | DIASTOLIC BLOOD PRESSURE: 80 MMHG

## 2025-08-04 DIAGNOSIS — E11.69 TYPE 2 DIABETES MELLITUS WITH OBESITY (MULTI): ICD-10-CM

## 2025-08-04 DIAGNOSIS — Z87.891 HISTORY OF TOBACCO ABUSE: ICD-10-CM

## 2025-08-04 DIAGNOSIS — I73.9 INTERMITTENT CLAUDICATION: ICD-10-CM

## 2025-08-04 DIAGNOSIS — E66.9 TYPE 2 DIABETES MELLITUS WITH OBESITY (MULTI): ICD-10-CM

## 2025-08-04 DIAGNOSIS — T46.6X5A MYALGIA DUE TO STATIN: ICD-10-CM

## 2025-08-04 DIAGNOSIS — M50.90 CERVICAL DISC DISEASE: ICD-10-CM

## 2025-08-04 DIAGNOSIS — F32.0 MILD MAJOR DEPRESSION: ICD-10-CM

## 2025-08-04 DIAGNOSIS — M79.10 MYALGIA DUE TO STATIN: ICD-10-CM

## 2025-08-04 DIAGNOSIS — R10.11 RUQ PAIN: ICD-10-CM

## 2025-08-04 DIAGNOSIS — E78.2 MIXED HYPERLIPIDEMIA: ICD-10-CM

## 2025-08-04 DIAGNOSIS — J43.2 CENTRILOBULAR EMPHYSEMA (MULTI): ICD-10-CM

## 2025-08-04 DIAGNOSIS — M85.89 OSTEOPENIA OF MULTIPLE SITES: ICD-10-CM

## 2025-08-04 DIAGNOSIS — Z12.31 ENCOUNTER FOR SCREENING MAMMOGRAM FOR MALIGNANT NEOPLASM OF BREAST: ICD-10-CM

## 2025-08-04 DIAGNOSIS — Z00.00 ANNUAL PHYSICAL EXAM: Primary | ICD-10-CM

## 2025-08-04 PROCEDURE — 1159F MED LIST DOCD IN RCRD: CPT | Performed by: FAMILY MEDICINE

## 2025-08-04 PROCEDURE — 1160F RVW MEDS BY RX/DR IN RCRD: CPT | Performed by: FAMILY MEDICINE

## 2025-08-04 PROCEDURE — 3008F BODY MASS INDEX DOCD: CPT | Performed by: FAMILY MEDICINE

## 2025-08-04 PROCEDURE — 3074F SYST BP LT 130 MM HG: CPT | Performed by: FAMILY MEDICINE

## 2025-08-04 PROCEDURE — 3079F DIAST BP 80-89 MM HG: CPT | Performed by: FAMILY MEDICINE

## 2025-08-04 PROCEDURE — 99397 PER PM REEVAL EST PAT 65+ YR: CPT | Performed by: FAMILY MEDICINE

## 2025-08-04 PROCEDURE — 99214 OFFICE O/P EST MOD 30 MIN: CPT | Performed by: FAMILY MEDICINE

## 2025-08-04 RX ORDER — PITAVASTATIN CALCIUM 2.09 MG/1
2 TABLET, FILM COATED ORAL DAILY
Qty: 30 TABLET | Refills: 3 | Status: SHIPPED | OUTPATIENT
Start: 2025-08-04 | End: 2025-09-03

## 2025-08-04 ASSESSMENT — PATIENT HEALTH QUESTIONNAIRE - PHQ9
1. LITTLE INTEREST OR PLEASURE IN DOING THINGS: SEVERAL DAYS
2. FEELING DOWN, DEPRESSED OR HOPELESS: SEVERAL DAYS
SUM OF ALL RESPONSES TO PHQ9 QUESTIONS 1 AND 2: 2

## 2025-08-04 NOTE — PROGRESS NOTES
Subjective   Patient ID: Claudia Coffey is a 70 y.o. female who presents for annual physical exam and review chronic conditions      HPI  History of Present Illness  The patient presents for evaluation of hiatal hernia, hypercholesterolemia, constipation, urinary retention, respiratory issues, and degenerative disc disease.    She has discontinued her cholesterol medication due to suspected side effects, including swelling. She also stopped taking Vascepa as she thinks she has a problem processing oils and digestion. She experienced pain when she would take fish oil or anything greasy. She is concerned about the impact of her weight loss on her cholesterol levels. Her total cholesterol and LDL remain elevated, while her HDL is low.    She reports difficulty in emptying her bladder. Constipation may be contributing to this issue.    Her respiratory health has improved. She visited a pulmonologist, Dr. Mclean, in 10/2024 and has a follow-up appointment scheduled for 10/09/2025. A CT scan was performed on 10/01/2025. She experiences frequent phlegm buildup in her throat, which often causes her to wake up choking. She occasionally takes Mucinex and finds relief from drinking mullein tea. She was prescribed Anoro, a lower dose than Trelegy, which she tolerates well.    She continues to experience neck pain and carpal tunnel syndrome but manages to kayak until her arms fall asleep. She believes this is due to degenerative disc disease as her legs also fall asleep after walking for 10 minutes.    She has lost weight and feels more energetic. She has been experiencing digestive issues, particularly with oily foods, which cause discomfort. She reports no diarrhea but does experience bloating. Her last gallbladder check was in 2017 during a bout of pancreatitis. She has lost approximately 30 pounds, averaging a pound per week, and is now gaining weight. She has increased her physical activity and exercise.    She has  constipation, but it is not as bad as it was when she first started. She eats prunes and uses flaxseeds and russ seeds in her yogurt, which helps her bowel movements.    Social History:  Hobbies: Kayaking    PAST SURGICAL HISTORY:  Gallbladder check during pancreatitis in 2017    FAMILY HISTORY  Both of her daughters have had serious gallbladder attacks requiring hospitalization.     Results  Labs   - Total cholesterol: Significantly elevated   - LDL: Elevated   - Good cholesterol: Low    Imaging   - CT scan of the lungs: 10/01/2024, Showed a hiatal hernia     Orders Only on 08/03/2025   Component Date Value    WHITE BLOOD CELL COUNT 07/29/2025 7.5     RED BLOOD CELL COUNT 07/29/2025 4.94     HEMOGLOBIN 07/29/2025 14.3     HEMATOCRIT 07/29/2025 44.4     MCV 07/29/2025 89.9     MCH 07/29/2025 28.9     MCHC 07/29/2025 32.2     RDW 07/29/2025 14.1     PLATELET COUNT 07/29/2025 355     MPV 07/29/2025 11.0     ABSOLUTE NEUTROPHILS 07/29/2025 4,290     ABSOLUTE LYMPHOCYTES 07/29/2025 2,213     ABSOLUTE MONOCYTES 07/29/2025 825     ABSOLUTE EOSINOPHILS 07/29/2025 143     ABSOLUTE BASOPHILS 07/29/2025 30     NEUTROPHILS 07/29/2025 57.2     LYMPHOCYTES 07/29/2025 29.5     MONOCYTES 07/29/2025 11.0     EOSINOPHILS 07/29/2025 1.9     BASOPHILS 07/29/2025 0.4     GLUCOSE 07/29/2025 91     UREA NITROGEN (BUN) 07/29/2025 11     CREATININE 07/29/2025 0.60     EGFR 07/29/2025 98     SODIUM 07/29/2025 139     POTASSIUM 07/29/2025 4.5     CHLORIDE 07/29/2025 104     CARBON DIOXIDE 07/29/2025 27     ELECTROLYTE BALANCE 07/29/2025 8     CALCIUM 07/29/2025 9.5     PROTEIN, TOTAL 07/29/2025 7.3     ALBUMIN 07/29/2025 4.5     BILIRUBIN, TOTAL 07/29/2025 0.4     ALKALINE PHOSPHATASE 07/29/2025 72     AST 07/29/2025 17     ALT 07/29/2025 27     HEMOGLOBIN A1c 07/29/2025 5.6     eAG (mg/dL) 07/29/2025 114     eAG (mmol/L) 07/29/2025 6.3     CHOLESTEROL, TOTAL 07/29/2025 236 (H)     HDL CHOLESTEROL 07/29/2025 39 (L)     TRIGLYCERIDES  07/29/2025 237 (H)     LDL-CHOLESTEROL 07/29/2025 157 (H)     CHOL/HDLC RATIO 07/29/2025 6.1 (H)     NON HDL CHOLESTEROL 07/29/2025 197 (H)     VITAMIN B12 07/29/2025 353         Israel is doing well.  1 x day working well.     Statin- She is on Livalo and having issues with cramping in the hands and feet and chest.  Has not tolerated other statins.      Metformin has been causing her indigestion.     She is eating fruit smoothes in the am with banana or apple.  They are eating sweets.   is working at [employer] and bringing home junk foods like donuts.      1/2025 vitamin B12 485, TSH 1.58, white blood cell 6.7, hemoglobin 14.5, hematocrit 44.2, platelets 391, differential normal  Hemoglobin A1c was 6.4  Electrolytes normal, kidney function normal, liver function showed AST of 26, ALT of 44    In general the patient states that her health is: good    Regular dental visits: Regular dental visit and has partials  Vision problems: no chagnes  Hearing loss: none    Diet: Trying Mediterranean.  Well balanced  Exercise:walking and aerobic exercise  Weight concerns: yes    Tobacco use:none  Alcohol use:none  Illicit drug use:none    Breast cancer screening:  Last mammogram: 10/2024.  Repeat 1 year  Regular self breast exams: yes  Any changes in the breast:no    Colon cancer screening:  Cologuard neg 1/3/24    Reviewed chronic medical conditions  Review of Systems    Objective   Physical Exam  General: Patient is alert and oriented ×3 and appears in no acute distress. No respiratory distress.    Head: Atraumatic normocephalic.    Eyes: EOMI, PERRLA      Ears: Canals patent without any irritation, tympanic membranes without inflammation, no swelling, normal light reflex.    Nose: Nares patent. Turbinates are not swollen. No discharge.    Mouth: Normal mucosa. Moist. No erythema, exudates, tonsillar enlargement.    Neck: Normal range of motion, no masses.  Thyroid is palpable and normal in size without any nodules.  No anterior cervical or posterior cervical adenopathy.    Heart: Regular rate and rhythm, no murmurs clicks or gallops    Lungs: Clear to auscultation bilaterally without any rhonchi rales or wheezing, lung sounds heard throughout all lung fields    Abdomen: Soft, nontender, no rigidity, rebound, guarding or organomegaly. Bowel sounds ×4 quadrants.    Musculoskeletal: Normal range of motion, strength is grossly intact in the proximal distal muscles of the upper and lower extremities bilaterally, deep tendon reflexes +2 out of 4 and symmetric bilaterally at the patella, Achilles, biceps, triceps, sensation intact.    Nerves: Cranial nerves II through XII appear grossly intact and without deficit    Skin: Intact, dry, no rashes or erythema    Psych: Normal affect.  Assessment/Plan   Problem List Items Addressed This Visit       Cervical disc disease    Mixed hyperlipidemia    Relevant Medications    pitavastatin calcium (Livalo) 2 mg tablet    Osteopenia of multiple sites    Intermittent claudication    Pulmonary emphysema (Multi)     Other Visit Diagnoses         Annual physical exam    -  Primary      Myalgia due to statin          Type 2 diabetes mellitus with obesity (Multi)        Relevant Medications    pitavastatin calcium (Livalo) 2 mg tablet    Other Relevant Orders    Albumin-Creatinine Ratio, Urine Random      History of tobacco abuse          RUQ pain        Relevant Orders    US right upper quadrant            Assessment & Plan  1. Hiatal Hernia.  - A scan of her lungs revealed a hiatal hernia, which may be contributing to her digestive issues.  - Experiences pain when consuming greasy foods or fish oil.  - The possibility of gallbladder involvement was discussed, given her history of pancreatitis and symptoms of belching, burping, bloating, and pain after eating fatty foods.  - A reevaluation of her gallbladder is planned.    2. Hypercholesterolemia.  - Total cholesterol and LDL levels remain  significantly elevated, while HDL is low.  - Despite weight loss and increased energy levels, the risk without medication is still high.  - She will resume her cholesterol medication.  - Medication sent to pharmacy.    3. Constipation.  - Reports that her constipation has improved with the use of prunes, flaxseeds, and russ seeds.  - Advised to continue these dietary measures and ensure adequate fiber intake to maintain bowel regularity.  - Reports no diarrhea.  - This will be monitored.    4. Urinary Retention.  - Reports difficulty emptying her bladder, which may be related to constipation.  - This will be monitored.  - Advised that constipation can press against the bladder.  - Urine will be checked.    5. Respiratory Issues.  - Reports improvement in breathing since starting Anoro, a lower dose medication without steroids.  - Experiences phlegm buildup and occasional choking.  - Regular use of Mucinex and the addition of N-acetylcysteine (NAC) were recommended to help thin the mucus.  - Medication sent to pharmacy.    6. Degenerative Disc Disease.  - Experiences numbness in her arms and legs after prolonged activities such as kayaking and walking, likely due to degenerative disc disease.  - This will be monitored.  - Reports neck pain and carpal tunnel are fine.  - Advised to continue activities as tolerated.    Patient is a 70-year-old female   Anticipatory guidance given  Mammogram  Had US 10/24  Cologuard was negative in December 23, 2020 and 1/3/2024  DEXA 2020- Normal without any osteopenia or osteoporosis  No need to follow-up with gynecologist at this time    Continue vitamin D 2000 IUs daily and coenzyme Q10 100 mg daily  Discussed diet and exercise. Patient has started exercising regularly.  Prediabetes-hemoglobin A1c 5.6 in the office 7/30/2025  -Discussed the risks of elevated blood sugars  Stopped metformin  mg 2 tabs daily due to diarrhea and Jardiance due to frequent urination.  We are  restarting Metformin  mg 2 tabs daily.  Cannot use GLP-1 due to history of pancreatitis but we tried it and is doing well on Mounjaro. This is working well  Discussed diet and exercise     Hypertriglyceridemia/Hypercholesterolemia- stable  Patient will work on diet and exercise.  Stopped Livalo but we are restarting       CHOLESTEROL, TOTAL 292 High  mg/dL LDL-CHOLESTEROL --    HDL CHOLESTEROL 40 Low  mg/dL CHOL/HDLC RATIO 7.3 High  (calc)   TRIGLYCERIDES 405 High  mg/dL  NON HDL CHOLESTEROL 252 High  mg/dL (calc)    1/2025 7/2025  CHOLESTEROL, TOTAL 236 High  mg/dL LDL-CHOLESTEROL 157 High  mg/dL (calc)    HDL CHOLESTEROL 39 Low  mg/dL CHOL/HDLC RATIO 6.1 High  (calc)   TRIGLYCERIDES 237 High  mg/dL  NON HDL CHOLESTEROL 197 High  mg/dL (calc)           DDD cervical thoracic anmd lumbar- Disability for this- stable     Emphysema/ COPD- stable   EKG was done in the office and showed normal sinus rate and rhythm as read by myself. Normal axis. No ST elevations or depressions. Normal T waves. Normal R wave progression.  PFTs done 2024 Reduced FEV1/FVC with a normal FVC indicates a possible airflow obstructive defect. Lung volumes by plethysmography indicate a restrictive ventilatory impairment. DLCO values are consistent with pulmonary vascular impairment, emphysema with preserved lung volume or anemia   Patient has 30-year pack   echo12/22: Left ventricular systolic function is low normal with a 50-55% estimated ejection fraction.  CT chest low dose 10/24-moderate to severe emphysema is present.  She has 4 nodules that are less than 3 mm.  There is also a small hiatal hernia.  She has fatty liver.  We will repeat the low-dose CT in 1 year  Reordered low dose CT 2025  Would suggest starting NAC   Sigulair 1 tab daily  Jessica Alejo  Use rescue inhaler if needed and has not needed  Following with Dr Gaspar for Pulmonology    Leg cramps  - Homeopathic leg cramps from [employer] and put into the water and sip on  it.   - Stay hydrated  - CJ normal  - Normal electrolytes  - Has degenerative changes.    - Worsned with statins    Hyperlipidemia and Hypertriglyceridemia  Has statin intolerance due to myalgias and elevated LFT'.    We will try fenofibrate and Vascepa    RAMIREZ  Fibroscan done 10/2024  Elastography measurements which, in the absence of other known  clinical signs, effectively excludes compensated advanced chronic  liver disease. If there are known clinical signs, further testing is  needed for confirmation.      Cervicalgia and shoulder pain and carpal tunnel left hand  - Exercises   - Tylenol 500-1000 mg up to 3 x day as needed    Depression  - Denies SI or HI.    - PHQ 9 done in the office and was 10  - Starting Venessa's Wort

## 2025-08-08 ENCOUNTER — HOSPITAL ENCOUNTER (OUTPATIENT)
Dept: RADIOLOGY | Facility: HOSPITAL | Age: 70
Discharge: HOME | End: 2025-08-08
Payer: COMMERCIAL

## 2025-08-08 DIAGNOSIS — R10.11 RUQ PAIN: ICD-10-CM

## 2025-08-08 PROCEDURE — 76705 ECHO EXAM OF ABDOMEN: CPT

## 2026-02-03 ENCOUNTER — APPOINTMENT (OUTPATIENT)
Dept: PRIMARY CARE | Facility: CLINIC | Age: 71
End: 2026-02-03
Payer: COMMERCIAL